# Patient Record
Sex: MALE | Race: WHITE | NOT HISPANIC OR LATINO | Employment: OTHER | URBAN - METROPOLITAN AREA
[De-identification: names, ages, dates, MRNs, and addresses within clinical notes are randomized per-mention and may not be internally consistent; named-entity substitution may affect disease eponyms.]

---

## 2023-08-24 ENCOUNTER — HOSPITAL ENCOUNTER (OUTPATIENT)
Dept: DATA CONVERSION | Facility: HOSPITAL | Age: 78
End: 2023-08-24

## 2023-09-17 PROBLEM — E78.5 HYPERLIPIDEMIA: Status: ACTIVE | Noted: 2023-09-17

## 2023-09-17 PROBLEM — I25.10 CORONARY ARTERIOSCLEROSIS: Status: ACTIVE | Noted: 2023-09-17

## 2023-09-17 PROBLEM — R04.0 EPISTAXIS: Status: ACTIVE | Noted: 2023-09-17

## 2023-09-17 PROBLEM — I10 BENIGN ESSENTIAL HYPERTENSION: Status: ACTIVE | Noted: 2023-09-17

## 2023-09-17 PROBLEM — G47.00 INSOMNIA: Status: ACTIVE | Noted: 2023-09-17

## 2023-09-17 PROBLEM — I10 HYPERTENSION: Status: ACTIVE | Noted: 2023-09-17

## 2023-09-17 PROBLEM — C44.42 SQUAMOUS CELL CARCINOMA OF SKIN OF SCALP AND NECK: Status: ACTIVE | Noted: 2021-04-08

## 2023-09-17 PROBLEM — I50.9 HEART FAILURE (MULTI): Status: ACTIVE | Noted: 2023-09-17

## 2023-09-17 PROBLEM — C44.319 BASAL CELL CARCINOMA OF SKIN OF OTHER PARTS OF FACE: Status: ACTIVE | Noted: 2021-04-08

## 2023-09-17 PROBLEM — J34.2 DEVIATED NASAL SEPTUM: Status: ACTIVE | Noted: 2023-09-17

## 2023-09-17 PROBLEM — C61 MALIGNANT TUMOR OF PROSTATE (MULTI): Status: ACTIVE | Noted: 2023-09-17

## 2023-09-17 RX ORDER — LISINOPRIL 20 MG/1
10 TABLET ORAL DAILY
COMMUNITY
Start: 2024-03-13

## 2023-09-17 RX ORDER — ASPIRIN 81 MG/1
81 TABLET ORAL EVERY EVENING
COMMUNITY
End: 2024-02-13 | Stop reason: HOSPADM

## 2023-09-17 RX ORDER — ATORVASTATIN CALCIUM 20 MG/1
1 TABLET, FILM COATED ORAL DAILY
COMMUNITY
Start: 2021-06-24 | End: 2024-06-07

## 2023-09-17 RX ORDER — NITROGLYCERIN 0.4 MG/1
0.4 TABLET SUBLINGUAL AS NEEDED
COMMUNITY
Start: 2021-02-09

## 2023-09-17 RX ORDER — ALLOPURINOL 300 MG/1
1 TABLET ORAL DAILY
COMMUNITY

## 2023-09-17 RX ORDER — CEFUROXIME AXETIL 250 MG/1
250 TABLET ORAL
COMMUNITY
Start: 2016-12-08 | End: 2024-01-11

## 2023-09-17 RX ORDER — IBUPROFEN 800 MG/1
800 TABLET ORAL 3 TIMES DAILY
COMMUNITY
End: 2024-01-11 | Stop reason: WASHOUT

## 2023-09-17 RX ORDER — ZOLPIDEM TARTRATE 10 MG/1
.5-1 TABLET ORAL NIGHTLY
COMMUNITY
Start: 2023-04-28 | End: 2023-10-25

## 2023-09-17 RX ORDER — DOCUSATE SODIUM 100 MG/1
100 CAPSULE, LIQUID FILLED ORAL 2 TIMES DAILY
COMMUNITY
End: 2024-01-11 | Stop reason: WASHOUT

## 2023-09-17 RX ORDER — TIZANIDINE 4 MG/1
4 TABLET ORAL EVERY 8 HOURS PRN
COMMUNITY
End: 2024-01-11 | Stop reason: WASHOUT

## 2023-09-17 RX ORDER — ACETAMINOPHEN 500 MG
500 TABLET ORAL EVERY 4 HOURS
COMMUNITY
End: 2024-01-31 | Stop reason: WASHOUT

## 2023-09-17 RX ORDER — SOLIFENACIN SUCCINATE 10 MG/1
10 TABLET, FILM COATED ORAL DAILY
COMMUNITY

## 2023-09-17 RX ORDER — LISINOPRIL 2.5 MG/1
2.5 TABLET ORAL DAILY
COMMUNITY
End: 2024-01-11 | Stop reason: WASHOUT

## 2023-09-17 RX ORDER — COLCHICINE 0.6 MG/1
0.6 TABLET ORAL AS NEEDED
COMMUNITY
Start: 2021-10-12 | End: 2024-01-31 | Stop reason: WASHOUT

## 2023-09-17 RX ORDER — METOPROLOL SUCCINATE 25 MG/1
0.5 TABLET, EXTENDED RELEASE ORAL DAILY
COMMUNITY
Start: 2024-03-13 | End: 2024-05-31

## 2023-09-25 ENCOUNTER — HOSPITAL ENCOUNTER (OUTPATIENT)
Dept: DATA CONVERSION | Facility: HOSPITAL | Age: 78
End: 2023-09-25
Payer: MEDICARE

## 2023-10-25 DIAGNOSIS — F51.01 PRIMARY INSOMNIA: Primary | ICD-10-CM

## 2023-10-25 RX ORDER — ZOLPIDEM TARTRATE 10 MG/1
TABLET ORAL
Qty: 90 TABLET | Refills: 2 | Status: SHIPPED | OUTPATIENT
Start: 2023-10-25 | End: 2024-04-08

## 2023-10-26 ENCOUNTER — APPOINTMENT (OUTPATIENT)
Dept: CARDIOLOGY | Facility: CLINIC | Age: 78
End: 2023-10-26

## 2024-01-11 ENCOUNTER — OFFICE VISIT (OUTPATIENT)
Dept: CARDIOLOGY | Facility: CLINIC | Age: 79
End: 2024-01-11
Payer: MEDICARE

## 2024-01-11 VITALS
DIASTOLIC BLOOD PRESSURE: 79 MMHG | HEIGHT: 69 IN | RESPIRATION RATE: 16 BRPM | WEIGHT: 195 LBS | HEART RATE: 63 BPM | SYSTOLIC BLOOD PRESSURE: 131 MMHG | OXYGEN SATURATION: 96 % | BODY MASS INDEX: 28.88 KG/M2

## 2024-01-11 DIAGNOSIS — I10 BENIGN ESSENTIAL HYPERTENSION: Primary | ICD-10-CM

## 2024-01-11 PROCEDURE — 3075F SYST BP GE 130 - 139MM HG: CPT | Performed by: NURSE PRACTITIONER

## 2024-01-11 PROCEDURE — 1126F AMNT PAIN NOTED NONE PRSNT: CPT | Performed by: NURSE PRACTITIONER

## 2024-01-11 PROCEDURE — 99214 OFFICE O/P EST MOD 30 MIN: CPT | Performed by: NURSE PRACTITIONER

## 2024-01-11 PROCEDURE — 1036F TOBACCO NON-USER: CPT | Performed by: NURSE PRACTITIONER

## 2024-01-11 PROCEDURE — 1160F RVW MEDS BY RX/DR IN RCRD: CPT | Performed by: NURSE PRACTITIONER

## 2024-01-11 PROCEDURE — 3078F DIAST BP <80 MM HG: CPT | Performed by: NURSE PRACTITIONER

## 2024-01-11 PROCEDURE — 1159F MED LIST DOCD IN RCRD: CPT | Performed by: NURSE PRACTITIONER

## 2024-01-11 ASSESSMENT — ENCOUNTER SYMPTOMS
MUSCULOSKELETAL NEGATIVE: 1
CONSTITUTIONAL NEGATIVE: 1
CARDIOVASCULAR NEGATIVE: 1
GASTROINTESTINAL NEGATIVE: 1
RESPIRATORY NEGATIVE: 1
DEPRESSION: 0
NEUROLOGICAL NEGATIVE: 1

## 2024-01-11 ASSESSMENT — PATIENT HEALTH QUESTIONNAIRE - PHQ9
2. FEELING DOWN, DEPRESSED OR HOPELESS: NOT AT ALL
SUM OF ALL RESPONSES TO PHQ9 QUESTIONS 1 AND 2: 0
1. LITTLE INTEREST OR PLEASURE IN DOING THINGS: NOT AT ALL

## 2024-01-11 NOTE — PROGRESS NOTES
"Chief Complaint:   Follow-up (9 Month FUV)    History Of Present Illness:    .Mr Huff returns in follow up.  Denies chest pain, sob, palpitations or pedal edema.  His pcp will do fasting labs.  He is seeing ortho next week about having his knee done.         Last Recorded Vitals:  Blood pressure 131/79, pulse 63, resp. rate 16, height 1.753 m (5' 9\"), weight 88.5 kg (195 lb), SpO2 96 %.     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/12/2021    History of acute myocardial infarction    Personal history of malignant neoplasm, unspecified     History of malignant neoplasm        Past Surgical History:  Past Surgical History:   Procedure Laterality Date    OTHER SURGICAL HISTORY  02/15/2022    Knee surgery    OTHER SURGICAL HISTORY  02/15/2022    Hernia repair       Social History:  Social History     Socioeconomic History    Marital status:      Spouse name: None    Number of children: None    Years of education: None    Highest education level: None   Occupational History    None   Tobacco Use    Smoking status: Former     Types: Cigarettes    Smokeless tobacco: Never   Substance and Sexual Activity    Alcohol use: Not Currently    Drug use: Never    Sexual activity: None   Other Topics Concern    None   Social History Narrative    None     Social Determinants of Health     Financial Resource Strain: Not on file   Food Insecurity: Not on file   Transportation Needs: Not on file   Physical Activity: Not on file   Stress: Not on file   Social Connections: Not on file   Intimate Partner Violence: Not on file   Housing Stability: Not on file       Family History:  Family History   Problem Relation Name Age of Onset    Other (CABG) Mother      Heart attack Father      Heart attack Brother      Other (CABG) Brother      Heart attack Brother      Other (CABG) Brother           Allergies:  Eszopiclone, Mirabegron, and Oxybutynin    Outpatient Medications:  Current " Outpatient Medications   Medication Sig Dispense Refill    acetaminophen (Tylenol) 500 mg tablet Take 1 tablet (500 mg) by mouth every 4 hours.      allopurinol (Zyloprim) 300 mg tablet Take 1 tablet (300 mg) by mouth once daily.      aspirin 81 mg EC tablet Take 1 tablet (81 mg) by mouth once daily in the evening.      atorvastatin (Lipitor) 20 mg tablet Take 1 tablet (20 mg) by mouth once daily.      colchicine, gout, 0.6 mg tablet Take 1 tablet (0.6 mg) by mouth if needed. w/ flare      lisinopril 20 mg tablet Take 1 tablet (20 mg) by mouth once daily.      metoprolol succinate XL (Toprol-XL) 25 mg 24 hr tablet Take 1 tablet (25 mg) by mouth once daily.      nitroglycerin (Nitrostat) 0.4 mg SL tablet Place 1 tablet (0.4 mg) under the tongue if needed for chest pain.      solifenacin (VESIcare) 10 mg tablet Take 1 tablet (10 mg) by mouth once daily.      zolpidem (Ambien) 10 mg tablet TAKE 1/2 TO 1 TABLET BY MOUTH  ONCE DAILY AT BEDTIME 90 tablet 2     No current facility-administered medications for this visit.        Physical Exam:  Cardiovascular:      PMI at left midclavicular line. Normal rate. Regular rhythm. Normal S1. Normal S2.       Murmurs: There is no murmur.      No gallop.  No click. No rub.   Pulses:     Intact distal pulses.   Edema:     Peripheral edema absent.         ROS:  Review of Systems   Constitutional: Negative.   Cardiovascular: Negative.    Respiratory: Negative.     Skin: Negative.    Musculoskeletal: Negative.    Gastrointestinal: Negative.    Genitourinary: Negative.    Neurological: Negative.           Last Labs:  CBC -  Lab Results   Component Value Date    WBC 5.7 10/11/2021    HGB 13.3 (L) 10/11/2021    HCT 39.7 (L) 10/11/2021    MCV 99.0 10/11/2021     10/11/2021       CMP -  Lab Results   Component Value Date    CALCIUM 9.2 01/31/2023    PROT 6.9 01/31/2023    ALBUMIN 4.3 01/31/2023    AST 19 01/31/2023    ALT 16 01/31/2023    ALKPHOS 112 01/31/2023    BILITOT 0.7  "01/31/2023       LIPID PANEL -   Lab Results   Component Value Date    CHOL 115 (L) 01/31/2023    TRIG 47 01/31/2023    HDL 50 01/31/2023    CHHDL 2.3 01/31/2023    LDLF 41 01/21/2022    VLDL 13 01/21/2022       RENAL FUNCTION PANEL -   Lab Results   Component Value Date    GLUCOSE 98 01/31/2023     01/31/2023    K 4.2 01/31/2023     01/31/2023    CO2 22 (L) 01/31/2023    ANIONGAP 12 01/31/2023    BUN 22 01/31/2023    CREATININE 1.3 01/31/2023    CALCIUM 9.2 01/31/2023    ALBUMIN 4.3 01/31/2023        No results found for: \"BNP\", \"HGBA1C\"      Assessment/Plan   Problem List Items Addressed This Visit    None    Impressions     1. CAD. Patient has a history of documented coronary artery disease with a non-STEMI on 6/15/2016 secondary to complete occlusion of the diagonal branch, not an amenable to percutaneous coronary intervention. Patient had a very similar episode of chest discomfort lasting nearly 3 hours for which she was seen at the [facility] emergency department on October 11, 2021. EKG and high sensitive troponins were negative for infarction. Given the very similar nature of his symptoms to the original non-STEMI, diagnostic coronary angiography was recommended. The patient underwent diagnostic coronary angiography with LMCA normal, LAD has minor proximal luminal irregularities, complete 100% occlusion of the proximal first diagonal branch as previously noted with retrograde filling. The nondominant LCx has moderate disease. The dominant right coronary artery also has minor disease except for a 50% stenosis within the early mid posterior descending branch unchanged from his previous study as well. The left ventricle EF shows preserved left ventricular function. No significant change in anatomy since his study that was performed 6/15/2016. Medical management was recommended. Echo done 04/2022 showed EF 60-65% with mild focal basal septal hypertrophy. We will have him return in 6 months.     2. " Hypertension.     3. Hyperlipidemia.          Mariela Goodson, APRN-CNP

## 2024-01-30 ENCOUNTER — APPOINTMENT (OUTPATIENT)
Dept: PREADMISSION TESTING | Facility: HOSPITAL | Age: 79
End: 2024-01-30
Payer: MEDICARE

## 2024-01-31 ENCOUNTER — OFFICE VISIT (OUTPATIENT)
Dept: PRIMARY CARE | Facility: CLINIC | Age: 79
End: 2024-01-31
Payer: MEDICARE

## 2024-01-31 VITALS
BODY MASS INDEX: 27.63 KG/M2 | WEIGHT: 193 LBS | HEIGHT: 70 IN | HEART RATE: 60 BPM | SYSTOLIC BLOOD PRESSURE: 122 MMHG | OXYGEN SATURATION: 97 % | DIASTOLIC BLOOD PRESSURE: 66 MMHG

## 2024-01-31 DIAGNOSIS — N40.1 BENIGN NON-NODULAR PROSTATIC HYPERPLASIA WITH LOWER URINARY TRACT SYMPTOMS: ICD-10-CM

## 2024-01-31 DIAGNOSIS — F51.04 CHRONIC INSOMNIA: ICD-10-CM

## 2024-01-31 DIAGNOSIS — I10 BENIGN ESSENTIAL HYPERTENSION: ICD-10-CM

## 2024-01-31 DIAGNOSIS — E78.2 MIXED HYPERLIPIDEMIA: ICD-10-CM

## 2024-01-31 DIAGNOSIS — N52.34 ERECTILE DYSFUNCTION FOLLOWING SIMPLE PROSTATECTOMY: ICD-10-CM

## 2024-01-31 DIAGNOSIS — M17.12 ARTHRITIS OF LEFT KNEE: Primary | ICD-10-CM

## 2024-01-31 DIAGNOSIS — R35.0 URINARY FREQUENCY: ICD-10-CM

## 2024-01-31 DIAGNOSIS — I50.42 CHRONIC COMBINED SYSTOLIC AND DIASTOLIC HEART FAILURE (MULTI): ICD-10-CM

## 2024-01-31 DIAGNOSIS — I25.10 CORONARY ARTERIOSCLEROSIS: ICD-10-CM

## 2024-01-31 DIAGNOSIS — C61 PROSTATE CANCER (MULTI): ICD-10-CM

## 2024-01-31 DIAGNOSIS — N20.0 RENAL STONE: ICD-10-CM

## 2024-01-31 PROBLEM — G47.00 INSOMNIA: Status: RESOLVED | Noted: 2023-09-17 | Resolved: 2024-01-31

## 2024-01-31 PROBLEM — R04.0 EPISTAXIS: Status: RESOLVED | Noted: 2023-09-17 | Resolved: 2024-01-31

## 2024-01-31 PROBLEM — N28.1 RENAL CYST: Status: ACTIVE | Noted: 2019-12-12

## 2024-01-31 PROCEDURE — 3078F DIAST BP <80 MM HG: CPT | Performed by: INTERNAL MEDICINE

## 2024-01-31 PROCEDURE — 1159F MED LIST DOCD IN RCRD: CPT | Performed by: INTERNAL MEDICINE

## 2024-01-31 PROCEDURE — 1126F AMNT PAIN NOTED NONE PRSNT: CPT | Performed by: INTERNAL MEDICINE

## 2024-01-31 PROCEDURE — 1036F TOBACCO NON-USER: CPT | Performed by: INTERNAL MEDICINE

## 2024-01-31 PROCEDURE — 1160F RVW MEDS BY RX/DR IN RCRD: CPT | Performed by: INTERNAL MEDICINE

## 2024-01-31 PROCEDURE — 3074F SYST BP LT 130 MM HG: CPT | Performed by: INTERNAL MEDICINE

## 2024-01-31 PROCEDURE — 99214 OFFICE O/P EST MOD 30 MIN: CPT | Performed by: INTERNAL MEDICINE

## 2024-01-31 RX ORDER — TAMSULOSIN HYDROCHLORIDE 0.4 MG/1
0.4 CAPSULE ORAL DAILY
COMMUNITY
Start: 2023-09-18 | End: 2024-01-31 | Stop reason: ALTCHOICE

## 2024-01-31 RX ORDER — NYSTATIN 100000 U/G
1 CREAM TOPICAL AS NEEDED
Status: ON HOLD | COMMUNITY
Start: 2024-01-24 | End: 2024-02-12 | Stop reason: ALTCHOICE

## 2024-01-31 RX ORDER — ISOSORBIDE MONONITRATE 30 MG/1
30 TABLET, EXTENDED RELEASE ORAL DAILY
Status: ON HOLD | COMMUNITY
End: 2024-02-12 | Stop reason: ALTCHOICE

## 2024-01-31 RX ORDER — TRIAMCINOLONE ACETONIDE 0.25 MG/G
1 CREAM TOPICAL DAILY PRN
Status: ON HOLD | COMMUNITY
Start: 2024-01-24 | End: 2024-02-12 | Stop reason: ALTCHOICE

## 2024-01-31 RX ORDER — KETOCONAZOLE 20 MG/ML
SHAMPOO, SUSPENSION TOPICAL DAILY PRN
Status: ON HOLD | COMMUNITY
Start: 2024-01-24 | End: 2024-02-12 | Stop reason: ALTCHOICE

## 2024-01-31 ASSESSMENT — ENCOUNTER SYMPTOMS
CARDIOVASCULAR NEGATIVE: 1
DYSURIA: 0
LOSS OF SENSATION IN FEET: 0
ABDOMINAL PAIN: 0
OCCASIONAL FEELINGS OF UNSTEADINESS: 0
ACTIVITY CHANGE: 0
SHORTNESS OF BREATH: 0
PSYCHIATRIC NEGATIVE: 1
PALPITATIONS: 0
COUGH: 0
ARTHRALGIAS: 1
DIZZINESS: 1
CONSTIPATION: 0
DIARRHEA: 0
CONSTITUTIONAL NEGATIVE: 1
DEPRESSION: 0

## 2024-01-31 ASSESSMENT — PATIENT HEALTH QUESTIONNAIRE - PHQ9
SUM OF ALL RESPONSES TO PHQ9 QUESTIONS 1 AND 2: 0
2. FEELING DOWN, DEPRESSED OR HOPELESS: NOT AT ALL
1. LITTLE INTEREST OR PLEASURE IN DOING THINGS: NOT AT ALL

## 2024-01-31 ASSESSMENT — PAIN SCALES - GENERAL: PAINLEVEL: 0-NO PAIN

## 2024-01-31 NOTE — PROGRESS NOTES
Subjective   Patient ID: Avery Huff is a 79 y.o. male who presents for SURGICAL CLEARANCE.    Scheduled for LTKR with Dr Joe on 2/12/2024    CAD with CHF-stable on meds-no NTG use-managed by Dr Edgar. Cleared 2 weeks ago for surg    Hypertension-compliant and stable on current medications BP Readings from Last 3 Encounters:  01/31/24 : 122/66  01/11/24 : 131/79  08/21/23 : 112/64       Hyperlipidemia-stable and compliant on meds. Taking. Lab Results       Component                Value               Date                       LDLCALC                  56 (L)              01/31/2023          H/O prostate cancer-now with urinary issue and ED-on vesicare    Kidney stone-uric acid  . Taking allopurinol.   Lab Results       Component                Value               Date                       URICACID                 5.5                 01/31/2023          Chronic insomnia-relies on zolpidem to sleep    Exercise -golfs, cares for house and yard;       Diet   -low fat         Current Outpatient Medications:   ·  allopurinol (Zyloprim) 300 mg tablet, Take 1 tablet (300 mg) by mouth once daily., Disp: , Rfl:   ·  aspirin 81 mg EC tablet, Take 1 tablet (81 mg) by mouth once daily in the evening., Disp: , Rfl:   ·  atorvastatin (Lipitor) 20 mg tablet, Take 1 tablet (20 mg) by mouth once daily., Disp: , Rfl:   ·  isosorbide mononitrate ER (Imdur) 30 mg 24 hr tablet, Take 1 tablet (30 mg) by mouth once daily., Disp: , Rfl:   ·  ketoconazole (NIZOral) 2 % shampoo, Apply topically., Disp: , Rfl:   ·  lisinopril 20 mg tablet, Take 1 tablet (20 mg) by mouth once daily., Disp: , Rfl:   ·  metoprolol succinate XL (Toprol-XL) 25 mg 24 hr tablet, Take 1 tablet (25 mg) by mouth once daily., Disp: , Rfl:   ·  nitroglycerin (Nitrostat) 0.4 mg SL tablet, Place 1 tablet (0.4 mg) under the tongue if needed for chest pain., Disp: , Rfl:   ·  nystatin (Mycostatin) cream, Apply topically., Disp: , Rfl:   ·  solifenacin (VESIcare) 10 mg  "tablet, Take 1 tablet (10 mg) by mouth once daily., Disp: , Rfl:   ·  triamcinolone (Kenalog) 0.025 % cream, Apply 1 Application topically., Disp: , Rfl:   ·  zolpidem (Ambien) 10 mg tablet, TAKE 1/2 TO 1 TABLET BY MOUTH  ONCE DAILY AT BEDTIME, Disp: 90 tablet, Rfl: 2                    Review of Systems   Constitutional: Negative.  Negative for activity change.   HENT:  Positive for sneezing.    Respiratory:  Negative for cough and shortness of breath.    Cardiovascular: Negative.  Negative for chest pain, palpitations and leg swelling.   Gastrointestinal:  Negative for abdominal pain, constipation and diarrhea.   Genitourinary:  Negative for dysuria.   Musculoskeletal:  Positive for arthralgias (catrina L knee).   Skin:  Negative for rash.   Neurological:  Positive for dizziness.   Psychiatric/Behavioral: Negative.         Objective   /66   Pulse 60   Ht 1.778 m (5' 10\")   Wt 87.5 kg (193 lb)   SpO2 97%   BMI 27.69 kg/m²     Physical Exam  Vitals reviewed.   Constitutional:       General: He is not in acute distress.     Appearance: Normal appearance.   HENT:      Right Ear: Tympanic membrane normal.      Left Ear: Tympanic membrane normal.      Nose: Nose normal. No congestion or rhinorrhea.      Mouth/Throat:      Pharynx: Oropharynx is clear.   Neck:      Thyroid: No thyromegaly.   Cardiovascular:      Rate and Rhythm: Normal rate and regular rhythm.      Heart sounds: Normal heart sounds. No murmur heard.     No gallop.   Pulmonary:      Breath sounds: Normal breath sounds. No wheezing, rhonchi or rales.   Abdominal:      General: Abdomen is flat. Bowel sounds are normal.      Palpations: Abdomen is soft.      Tenderness: There is no abdominal tenderness.   Genitourinary:     Comments: Defer to urology  Musculoskeletal:      Comments: Defer to Dr Joe   Lymphadenopathy:      Cervical: No cervical adenopathy.   Skin:     General: Skin is warm and dry.      Findings: No rash.   Neurological:      " General: No focal deficit present.      Mental Status: He is alert and oriented to person, place, and time.   Psychiatric:         Mood and Affect: Mood normal.         Assessment/Plan   medically cleared for surgery  Diagnoses and all orders for this visit:  Arthritis of left knee  Benign essential hypertension  -     Comprehensive Metabolic Panel; Future  Coronary arteriosclerosis  Chronic combined systolic and diastolic heart failure (CMS/HCC)  Mixed hyperlipidemia  -     Lipid Panel; Future  Benign non-nodular prostatic hyperplasia with lower urinary tract symptoms  Erectile dysfunction following simple prostatectomy  Renal stone  -     Uric Acid; Future  Urinary frequency  Prostate cancer (CMS/HCC)  Chronic insomnia

## 2024-02-05 ENCOUNTER — APPOINTMENT (OUTPATIENT)
Dept: PRIMARY CARE | Facility: CLINIC | Age: 79
End: 2024-02-05
Payer: MEDICARE

## 2024-02-06 ENCOUNTER — PRE-ADMISSION TESTING (OUTPATIENT)
Dept: PREADMISSION TESTING | Facility: HOSPITAL | Age: 79
End: 2024-02-06
Payer: MEDICARE

## 2024-02-06 ENCOUNTER — LAB (OUTPATIENT)
Dept: LAB | Facility: LAB | Age: 79
End: 2024-02-06
Payer: MEDICARE

## 2024-02-06 VITALS
SYSTOLIC BLOOD PRESSURE: 149 MMHG | TEMPERATURE: 97.2 F | BODY MASS INDEX: 35.51 KG/M2 | HEART RATE: 64 BPM | RESPIRATION RATE: 16 BRPM | HEIGHT: 62 IN | WEIGHT: 193 LBS | DIASTOLIC BLOOD PRESSURE: 82 MMHG | OXYGEN SATURATION: 99 %

## 2024-02-06 DIAGNOSIS — N20.0 RENAL STONE: ICD-10-CM

## 2024-02-06 DIAGNOSIS — Z01.818 PREOPERATIVE TESTING: ICD-10-CM

## 2024-02-06 DIAGNOSIS — E78.2 MIXED HYPERLIPIDEMIA: ICD-10-CM

## 2024-02-06 DIAGNOSIS — Z01.818 PREOPERATIVE TESTING: Primary | ICD-10-CM

## 2024-02-06 DIAGNOSIS — I10 BENIGN ESSENTIAL HYPERTENSION: ICD-10-CM

## 2024-02-06 LAB
ALBUMIN SERPL BCP-MCNC: 4.5 G/DL (ref 3.4–5)
ALP SERPL-CCNC: 105 U/L (ref 33–136)
ALT SERPL W P-5'-P-CCNC: 15 U/L (ref 10–52)
ANION GAP SERPL CALC-SCNC: 12 MMOL/L (ref 10–20)
APPEARANCE UR: CLEAR
AST SERPL W P-5'-P-CCNC: 16 U/L (ref 9–39)
BILIRUB SERPL-MCNC: 0.6 MG/DL (ref 0–1.2)
BILIRUB UR STRIP.AUTO-MCNC: NEGATIVE MG/DL
BUN SERPL-MCNC: 26 MG/DL (ref 6–23)
CALCIUM SERPL-MCNC: 9.4 MG/DL (ref 8.6–10.3)
CHLORIDE SERPL-SCNC: 105 MMOL/L (ref 98–107)
CHOLEST SERPL-MCNC: 118 MG/DL (ref 0–199)
CHOLESTEROL/HDL RATIO: 2.5
CO2 SERPL-SCNC: 27 MMOL/L (ref 21–32)
COLOR UR: NORMAL
CREAT SERPL-MCNC: 1.27 MG/DL (ref 0.5–1.3)
EGFRCR SERPLBLD CKD-EPI 2021: 57 ML/MIN/1.73M*2
ERYTHROCYTE [DISTWIDTH] IN BLOOD BY AUTOMATED COUNT: 13.9 % (ref 11.5–14.5)
GLUCOSE SERPL-MCNC: 71 MG/DL (ref 74–99)
GLUCOSE UR STRIP.AUTO-MCNC: NORMAL MG/DL
HCT VFR BLD AUTO: 44.7 % (ref 41–52)
HDLC SERPL-MCNC: 48.1 MG/DL
HGB BLD-MCNC: 14.4 G/DL (ref 13.5–17.5)
KETONES UR STRIP.AUTO-MCNC: NEGATIVE MG/DL
LDLC SERPL CALC-MCNC: 45 MG/DL
LEUKOCYTE ESTERASE UR QL STRIP.AUTO: NEGATIVE
MCH RBC QN AUTO: 33 PG (ref 26–34)
MCHC RBC AUTO-ENTMCNC: 32.2 G/DL (ref 32–36)
MCV RBC AUTO: 103 FL (ref 80–100)
MUCOUS THREADS #/AREA URNS AUTO: NORMAL /LPF
NITRITE UR QL STRIP.AUTO: NEGATIVE
NON HDL CHOLESTEROL: 70 MG/DL (ref 0–149)
NRBC BLD-RTO: 0 /100 WBCS (ref 0–0)
PH UR STRIP.AUTO: 5 [PH]
PLATELET # BLD AUTO: 237 X10*3/UL (ref 150–450)
POTASSIUM SERPL-SCNC: 4.3 MMOL/L (ref 3.5–5.3)
PROT SERPL-MCNC: 6.8 G/DL (ref 6.4–8.2)
PROT UR STRIP.AUTO-MCNC: NORMAL MG/DL
RBC # BLD AUTO: 4.36 X10*6/UL (ref 4.5–5.9)
RBC # UR STRIP.AUTO: NEGATIVE /UL
RBC #/AREA URNS AUTO: NORMAL /HPF
SODIUM SERPL-SCNC: 140 MMOL/L (ref 136–145)
SP GR UR STRIP.AUTO: 1.02
TRIGL SERPL-MCNC: 124 MG/DL (ref 0–149)
URATE SERPL-MCNC: 5.3 MG/DL (ref 4–7.5)
UROBILINOGEN UR STRIP.AUTO-MCNC: NORMAL MG/DL
VLDL: 25 MG/DL (ref 0–40)
WBC # BLD AUTO: 8.2 X10*3/UL (ref 4.4–11.3)
WBC #/AREA URNS AUTO: NORMAL /HPF

## 2024-02-06 PROCEDURE — 93005 ELECTROCARDIOGRAM TRACING: CPT

## 2024-02-06 PROCEDURE — 81001 URINALYSIS AUTO W/SCOPE: CPT | Performed by: NURSE PRACTITIONER

## 2024-02-06 PROCEDURE — 93010 ELECTROCARDIOGRAM REPORT: CPT | Performed by: INTERNAL MEDICINE

## 2024-02-06 PROCEDURE — 87081 CULTURE SCREEN ONLY: CPT | Mod: TRILAB,WESLAB | Performed by: NURSE PRACTITIONER

## 2024-02-06 PROCEDURE — 85027 COMPLETE CBC AUTOMATED: CPT

## 2024-02-06 PROCEDURE — 80053 COMPREHEN METABOLIC PANEL: CPT

## 2024-02-06 PROCEDURE — 99204 OFFICE O/P NEW MOD 45 MIN: CPT | Performed by: NURSE PRACTITIONER

## 2024-02-06 PROCEDURE — 36415 COLL VENOUS BLD VENIPUNCTURE: CPT

## 2024-02-06 PROCEDURE — 80061 LIPID PANEL: CPT

## 2024-02-06 PROCEDURE — 84550 ASSAY OF BLOOD/URIC ACID: CPT

## 2024-02-06 RX ORDER — CHLORHEXIDINE GLUCONATE ORAL RINSE 1.2 MG/ML
SOLUTION DENTAL
Qty: 473 ML | Refills: 0 | Status: SHIPPED | OUTPATIENT
Start: 2024-02-06 | End: 2024-02-13 | Stop reason: HOSPADM

## 2024-02-06 ASSESSMENT — ENCOUNTER SYMPTOMS
ARTHRALGIAS: 1
ACTIVITY CHANGE: 1
PSYCHIATRIC NEGATIVE: 1
GASTROINTESTINAL NEGATIVE: 1
NEUROLOGICAL NEGATIVE: 1
RESPIRATORY NEGATIVE: 1
CARDIOVASCULAR NEGATIVE: 1
EYES NEGATIVE: 1

## 2024-02-06 ASSESSMENT — PAIN - FUNCTIONAL ASSESSMENT: PAIN_FUNCTIONAL_ASSESSMENT: 0-10

## 2024-02-06 ASSESSMENT — DUKE ACTIVITY SCORE INDEX (DASI)
CAN YOU DO YARD WORK LIKE RAKING LEAVES, WEEDING OR PUSHING A MOWER: YES
DASI METS SCORE: 9
CAN YOU WALK INDOORS, SUCH AS AROUND YOUR HOUSE: YES
CAN YOU TAKE CARE OF YOURSELF (EAT, DRESS, BATHE, OR USE TOILET): YES
CAN YOU PARTICIPATE IN STRENOUS SPORTS LIKE SWIMMING, SINGLES TENNIS, FOOTBALL, BASKETBALL, OR SKIING: NO
CAN YOU DO MODERATE WORK AROUND THE HOUSE LIKE VACUUMING, SWEEPING FLOORS OR CARRYING GROCERIES: YES
CAN YOU DO HEAVY WORK AROUND THE HOUSE LIKE SCRUBBING FLOORS OR LIFTING AND MOVING HEAVY FURNITURE: YES
CAN YOU RUN A SHORT DISTANCE: YES
TOTAL_SCORE: 50.7
CAN YOU WALK A BLOCK OR TWO ON LEVEL GROUND: YES
CAN YOU DO LIGHT WORK AROUND THE HOUSE LIKE DUSTING OR WASHING DISHES: YES
CAN YOU HAVE SEXUAL RELATIONS: YES
CAN YOU PARTICIPATE IN MODERATE RECREATIONAL ACTIVITIES LIKE GOLF, BOWLING, DANCING, DOUBLES TENNIS OR THROWING A BASEBALL OR FOOTBALL: YES
CAN YOU CLIMB A FLIGHT OF STAIRS OR WALK UP A HILL: YES

## 2024-02-06 ASSESSMENT — CHADS2 SCORE
CHADS2 SCORE: 3
CHF: YES
AGE GREATER THAN OR EQUAL TO 75: YES
PRIOR STROKE OR TIA OR THROMBOEMBOLISM: NO
HYPERTENSION: YES
DIABETES: NO

## 2024-02-06 ASSESSMENT — PAIN SCALES - GENERAL: PAINLEVEL_OUTOF10: 7

## 2024-02-06 ASSESSMENT — PAIN DESCRIPTION - DESCRIPTORS: DESCRIPTORS: SHARP;DULL;BURNING

## 2024-02-06 NOTE — PREPROCEDURE INSTRUCTIONS
PAT DISCHARGE INSTRUCTIONS    Please call the Same Day Surgery (SDS) Department of the hospital where your procedure will be performed after 2:00 PM the day before your surgery. If you are scheduled on a Monday, or a Tuesday following a Monday holiday, you will need to call on the last business day prior to your surgery.    [facility]  [address], [address]  [phone]    [facility]  [address] [place] [address] [phone]    [facility]  [address]  [place] [id number]  [phone]    Please let your surgeon know if:      You develop any open sores, shingles, burning or painful urination as these may increase your risk of an infection.   You no longer wish to have the surgery.   Any other personal circumstances change that may lead to the need to cancel or defer this surgery-such as being sick or getting admitted to any hospital within one week of your planned procedure.    Your contact details change, such as a change of address or phone number.    Starting now:     Please DO NOT drink alcohol or smoke for 24 hours before surgery. It is well known that quitting smoking can make a huge difference to your health and recovery from surgery. The longer you abstain from smoking, the better your chances of a healthy recovery. If you need help with quitting, call 7-800-QUIT-NOW to be connected to a trained counselor who will discuss the best methods to help you quit.     Before your surgery:    Please stop all supplements 7 days prior to surgery. Or as directed by your surgeon.   Please stop taking NSAID pain medicine such as Advil and Motrin 7 days before surgery.    If you develop any fever, cough, cold, rashes, cuts, scratches, scrapes, urinary symptoms or infection anywhere on your body (including teeth and gums) prior to surgery, please call your surgeon’s office as soon as  possible. This may require treatment to reduce the chance of cancellation on the day of surgery.    The day before your surgery:   DIET- Do not eat any food after MIDNIGHT. May have 10 ounces of CLEAR LIQUIDS until TWO HOURS before your arrival time. This includes water, black tea or coffee (no milk ir cream), apple juice and electrolyte drinks (Gatorade). May chew gum until TWO hours before your surgery time.   Get a good night’s rest.  Use the special soap for bathing if you have been instructed to use one.    Scheduled surgery times may change and you will be notified if this occurs - please check your personal voicemail for any updates.     On the morning of surgery:   Wear comfortable, loose fitting clothes which open in the front. Please do not wear moisturizers, creams, lotions, makeup or perfume.    Please bring with you to surgery:   Photo ID and insurance card   Current list of medicines and allergies   Pacemaker/ Defibrillator/Heart stent cards   CPAP machine and mask    Slings/ splints/ crutches   A copy of your complete advanced directive/DHPOA.    Please do NOT bring with you to surgery:   All jewelry and valuables should be left at home.   Prosthetic devices such as contact lenses, hearing aids, dentures, eyelash extensions, hairpins and body piercings must be removed prior to going in to the surgical suite.    After outpatient surgery:   A responsible adult MUST accompany you at the time of discharge and stay with you for 24 hours after your surgery. You may NOT drive yourself home after surgery.    Do not drive, operate machinery, make critical decisions or do activities that require co-ordination or balance until after a night’s sleep.   Do not drink alcoholic beverages for 24 hours.   Instructions for resuming your medications will be provided by your surgeon.    CALL YOUR DOCTOR AFTER SURGERY IF YOU HAVE:     Chills and/or a fever of 101° F or higher.    Redness, swelling, pus or drainage from  your surgical wound or a bad smell from the wound.    Lightheadedness, fainting or confusion.    Persistent vomiting (throwing up) and are not able to eat or drink for 12 hours.    Three or more loose, watery bowel movements in 24 hours (diarrhea).   Difficulty or pain while urinating( after non-urological surgery)    Pain and swelling in your legs, especially if it is only on one side.    Difficulty breathing or are breathing faster than normal.    Any new concerning symptoms.     Home Preoperative Antibacterial Shower    What is a home preoperative antibacterial shower?  This shower is a way of cleaning the skin with a germ killing solution before surgery. The solution contains chlorhexidine, commonly known as CHG. CHG is a skin cleanser with germ killing ability. Let your doctor know if you are allergic to chlorhexidine.      Why do I need to take a preoperative antibacterial shower?  Skin is not sterile. It is best to try to make your skin as free of germs as possible before surgery. Proper cleansing with a germ killing soap before surgery can lower the number of germs on your skin. This helps to reduce the risk of infection at the surgical site. Following the instructions listed below will help you prepare your skin for surgery.    How do I use the solution?      Steps: Begin using your CHG soap FIVE DAYS BEFORE your scheduled surgery on __________________________________________________.  First, wash and rinse your hair using the CHG soap. Keep CHG away soap away from ear canals and eyes.  Rinse completely, do not condition. Hair extensions should be removed.  Wash your face with your normal soap and rinse.  Apply the CHG solution to a clean wet washcloth. Turn the water off or move away from the water spray to avoid premature rinsing of the CHG soap as you are applying.  Firmly lather your entire body from neck down. Do not use on face.  Pay special attention to the areas(s) where your incision(s) will be  located unless they are on your face.  Avoid scrubbing your skin too hard.  The important point is to have the CHG soap sit on your skin for 3 minutes.  DO NOT wash with regular soap after you have used the CHG soap solution.  Pat yourself dry with a clean, freshly laundered towel.  DO NOT apply powders, deodorants or lotions.  Dress in clean, freshly laundered night clothes.  Be sure to sleep with clean, freshly laundered sheets.  Be aware that CHG will cause stains on fabrics; if you wash them with bleach after use. Rinse your washcloth and other linens that have contact with CHG completely. Use only non-chlorine detergents to launder the items used.  The morning of surgery is the fifth day. Repeat the above steps and dress in clean comfortable clothing.      Who should I call if I have any questions regarding the use of CHG soap?  Call the [facility] at [phone] if you have any questions.     Patient Information: Oral/Dental Rinse    What is oral/dental rinse?  It is a mouthwash. It is a way of cleaning the mouth with a germ killing solution before your surgery. The solution contains chlorhexidine, commonly know as CHG.  It is used inside the mouth to kill bacteria known as Staphylococcus aureus.  Let your doctor know if you are allergic to chlorhexidine.    Why do I need to use CHG oral/dental rinse?  The CHG oral/dental rinse helps to kill bacteria in your mouth known as Staphylococcus aureus. This reduces the risk of infection at the surgical site.    Using your CHG oral/dental rinse.  STEPS: use your CHG oral/dental rinse after you brush your teeth the night before (at bedtime) and the morning of your surgery. Follow the directions on your prescription label.  Use the cap on the container to measure 15ml (fill cap to fill line)  Swish (gargle if you can) the mouthwash in your mouth for at least 30 seconds, (do not swallow) spit  out.  After you use your CHG rinse, do not rinse your mouth with water, drink or eat. Please refer to prescription label for the appropriate time to resume oral intake.    What side effects might I have using the CHG oral/dental rinse?  CHG rinse will stick to the plaque on the teeth. Brush and floss just before use. Teeth brushing will help avoid staining of plaque during use.    Who should I contact if I have questions about the CHG oral/dental rinse?  Please call [facility], [facility] at [phone] if you have any questions. Patient Information: Oral/Dental Rinse    What is oral/dental rinse?  It is a mouthwash. It is a way of cleaning the mouth with a germ killing solution before your surgery. The solution contains chlorhexidine, commonly know as CHG.  It is used inside the mouth to kill bacteria known as Staphylococcus aureus.  Let your doctor know if you are allergic to chlorhexidine.    Why do I need to use CHG oral/dental rinse?  The CHG oral/dental rinse helps to kill bacteria in your mouth known as Staphylococcus aureus. This reduces the risk of infection at the surgical site.    Using your CHG oral/dental rinse.  STEPS: use your CHG oral/dental rinse after you brush your teeth the night before (at bedtime) and the morning of your surgery. Follow the directions on your prescription label.  Use the cap on the container to measure 15ml (fill cap to fill line)  Swish (gargle if you can) the mouthwash in your mouth for at least 30 seconds, (do not swallow) spit out.  After you use your CHG rinse, do not rinse your mouth with water, drink or eat. Please refer to prescription label for the appropriate time to resume oral intake.    What side effects might I have using the CHG oral/dental rinse?  CHG rinse will stick to the plaque on the teeth. Brush and floss just before use. Teeth brushing will help avoid staining of plaque during use.    Who should I  contact if I have questions about the CHG oral/dental rinse?  Please call [facility], [facility] at [phone] if you have any questions.       Medication List            Accurate as of February 6, 2024  2:35 PM. Always use your most recent med list.                allopurinol 300 mg tablet  Commonly known as: Zyloprim  Notes to patient: Take evening dose before surgery.     aspirin 81 mg EC tablet  Medication Adjustments for Surgery: Stop 7 days before surgery     atorvastatin 20 mg tablet  Commonly known as: Lipitor  Notes to patient: Take evening dose before surgery.     isosorbide mononitrate ER 30 mg 24 hr tablet  Commonly known as: Imdur  Notes to patient: NOT TAKING     ketoconazole 2 % shampoo  Commonly known as: NIZOral  Notes to patient: Take as needed.     lisinopril 20 mg tablet  Medication Adjustments for Surgery: Stop 1 day before surgery     metoprolol succinate XL 25 mg 24 hr tablet  Commonly known as: Toprol-XL  Notes to patient: Take evening dose before surgery.     Nitrostat 0.4 mg SL tablet  Generic drug: nitroglycerin  Notes to patient: Take as needed.     nystatin cream  Commonly known as: Mycostatin  Notes to patient: Take as needed.     solifenacin 10 mg tablet  Commonly known as: VESIcare  Notes to patient: Take evening dose before surgery.     triamcinolone 0.025 % cream  Commonly known as: Kenalog  Notes to patient: Take as needed.     zolpidem 10 mg tablet  Commonly known as: Ambien  TAKE 1/2 TO 1 TABLET BY MOUTH  ONCE DAILY AT BEDTIME  Notes to patient: Take evening dose before surgery.

## 2024-02-06 NOTE — CPM/PAT H&P
"CPM/PAT Evaluation       Name: Avery Huff (Avery Huff)  /Age: 1945/79 y.o.     In-Person       Chief Complaint: LEFT KNEE OSTEOARTHRITIS     HPI  A 79-year-old male with left knee osteoarthritis.  History of left knee surgery x 2 as a teen. He has had progressive left knee pain and swelling for the past several months. Reports knee \"catching\". Symptoms increase with prolonged standing, ambulation, and transitioning from sitting to standing interfering with ADLs.  Conservative treatments not helping.  Denies fever, chills, chest pain, shortness of breath, syncope, or left lower extremity numbness.  He is scheduled for left total knee arthroplasty.    Past Medical History:   Diagnosis Date    BPH (benign prostatic hyperplasia)     CAD (coronary artery disease)     H/O left knee surgery     x 2 as a teen    Heart failure (CMS/HCC)     HLD (hyperlipidemia)     Hypertension     Old myocardial infarction 10/12/2021    History of acute myocardial infarction    Personal history of malignant neoplasm, unspecified     History of malignant neoplasm    Prostate cancer (CMS/HCC)     Skin cancer        Past Surgical History:   Procedure Laterality Date    CARDIAC CATHETERIZATION      OTHER SURGICAL HISTORY  02/15/2022    Knee surgery    OTHER SURGICAL HISTORY  02/15/2022    Hernia repair         Allergies   Allergen Reactions    Eszopiclone Other     Bad taste    Mirabegron Other     Raised BP    Oxybutynin GI Upset       Current Outpatient Medications   Medication Sig Dispense Refill    allopurinol (Zyloprim) 300 mg tablet Take 1 tablet (300 mg) by mouth once daily.      aspirin 81 mg EC tablet Take 1 tablet (81 mg) by mouth once daily in the evening.      atorvastatin (Lipitor) 20 mg tablet Take 1 tablet (20 mg) by mouth once daily.      chlorhexidine (Peridex) 0.12 % solution Use cap to measure 15 mL.  Swish/gargle mouthwash for at least 30 seconds.  Do not swallow.  Use night before surgery after brushing " teeth and morning of surgery after brushing teeth. 473 mL 0    isosorbide mononitrate ER (Imdur) 30 mg 24 hr tablet Take 1 tablet (30 mg) by mouth once daily.      ketoconazole (NIZOral) 2 % shampoo Apply topically once daily as needed for irritation or dandruff.      lisinopril 20 mg tablet Take 1 tablet (20 mg) by mouth once daily.      metoprolol succinate XL (Toprol-XL) 25 mg 24 hr tablet Take 1 tablet (25 mg) by mouth once daily.      nitroglycerin (Nitrostat) 0.4 mg SL tablet Place 1 tablet (0.4 mg) under the tongue if needed for chest pain.      nystatin (Mycostatin) cream Apply 1 Application topically if needed (RASH).      solifenacin (VESIcare) 10 mg tablet Take 1 tablet (10 mg) by mouth once daily.      triamcinolone (Kenalog) 0.025 % cream Apply 1 Application topically once daily as needed for rash or irritation.      zolpidem (Ambien) 10 mg tablet TAKE 1/2 TO 1 TABLET BY MOUTH  ONCE DAILY AT BEDTIME (Patient taking differently: Take 1 tablet (10 mg) by mouth as needed at bedtime for sleep.) 90 tablet 2     No current facility-administered medications for this visit.          Review of Systems   Constitutional:  Positive for activity change.   HENT: Negative.     Eyes: Negative.    Respiratory: Negative.     Cardiovascular: Negative.    Gastrointestinal: Negative.    Genitourinary: Negative.    Musculoskeletal:  Positive for arthralgias.        Left knee pain, swelling   Skin: Negative.    Neurological: Negative.    Psychiatric/Behavioral: Negative.          Physical Exam  Vitals reviewed.   Constitutional:       Appearance: Normal appearance.   HENT:      Head: Normocephalic and atraumatic.      Mouth/Throat:      Mouth: Mucous membranes are moist.   Eyes:      Pupils: Pupils are equal, round, and reactive to light.   Cardiovascular:      Rate and Rhythm: Normal rate and regular rhythm.   Pulmonary:      Effort: Pulmonary effort is normal.      Breath sounds: Normal breath sounds.   Abdominal:       "Palpations: Abdomen is soft.   Musculoskeletal:      Cervical back: Normal range of motion.      Right lower leg: Edema present.      Left lower leg: Edema present.      Comments: Reports left knee pain, swelling and \"catching\"   Skin:     General: Skin is warm and dry.      Capillary Refill: Capillary refill takes more than 3 seconds.   Neurological:      Mental Status: He is alert and oriented to person, place, and time.   Psychiatric:         Mood and Affect: Mood normal.          PAT AIRWAY:   Airway:     Mallampati::  II    Neck ROM::  Full      /82   Pulse 64   Temp 36.2 °C (97.2 °F) (Temporal)   Resp 16   Ht 1.575 m (5' 2\")   Wt 87.5 kg (193 lb)   SpO2 99%   BMI 35.30 kg/m²         Stop Bang Score 6     CHADS 2 score: 5.9%  DASI score: 50.7  METS score: 9  Revised cardiac risk index: >11%  ASA III  ARISCAT 1.6%    Cardiac clearance done 1/16/2024 with PCP Dr. Koo      Assessment and Plan:     LEFT KNEE OSTEOARTHRITIS Plan: left total knee arthroplasty.  CAD history of MI 2016 status post cardiac medical management-follows with Dr. Edgar cardiologist  Hypertension  History of heart failure per record Echo LVEF 60-65% 4/2022  Hyperlipidemia  BPH  Prostate cancer per record  BMI 35.30        "

## 2024-02-08 LAB
ATRIAL RATE: 61 BPM
P AXIS: 58 DEGREES
P OFFSET: 165 MS
P ONSET: 117 MS
PR INTERVAL: 198 MS
Q ONSET: 216 MS
QRS COUNT: 10 BEATS
QRS DURATION: 88 MS
QT INTERVAL: 428 MS
QTC CALCULATION(BAZETT): 430 MS
QTC FREDERICIA: 430 MS
R AXIS: -31 DEGREES
STAPHYLOCOCCUS SPEC CULT: NORMAL
T AXIS: 20 DEGREES
T OFFSET: 430 MS
VENTRICULAR RATE: 61 BPM

## 2024-02-09 ENCOUNTER — ANESTHESIA EVENT (OUTPATIENT)
Dept: OPERATING ROOM | Facility: HOSPITAL | Age: 79
End: 2024-02-09
Payer: MEDICARE

## 2024-02-09 ENCOUNTER — DOCUMENTATION (OUTPATIENT)
Dept: PREADMISSION TESTING | Facility: HOSPITAL | Age: 79
End: 2024-02-09
Payer: MEDICARE

## 2024-02-09 PROBLEM — Z96.652 S/P TOTAL KNEE ARTHROPLASTY, LEFT: Status: ACTIVE | Noted: 2024-02-09

## 2024-02-09 NOTE — PROGRESS NOTES
Spoke to Chani in Dr aCruso's office. She was notified that the patient is going to need cardiac clearance before surgery d/t abnormal EKG.   RAYMUNDO Chery-CNP

## 2024-02-12 ENCOUNTER — HOME HEALTH ADMISSION (OUTPATIENT)
Dept: HOME HEALTH SERVICES | Facility: HOME HEALTH | Age: 79
End: 2024-02-12
Payer: MEDICARE

## 2024-02-12 ENCOUNTER — APPOINTMENT (OUTPATIENT)
Dept: RADIOLOGY | Facility: HOSPITAL | Age: 79
DRG: 982 | End: 2024-02-12
Payer: MEDICARE

## 2024-02-12 ENCOUNTER — HOSPITAL ENCOUNTER (OUTPATIENT)
Facility: HOSPITAL | Age: 79
LOS: 1 days | Discharge: HOME | DRG: 982 | End: 2024-02-13
Attending: STUDENT IN AN ORGANIZED HEALTH CARE EDUCATION/TRAINING PROGRAM | Admitting: STUDENT IN AN ORGANIZED HEALTH CARE EDUCATION/TRAINING PROGRAM
Payer: MEDICARE

## 2024-02-12 ENCOUNTER — ANESTHESIA (OUTPATIENT)
Dept: OPERATING ROOM | Facility: HOSPITAL | Age: 79
End: 2024-02-12
Payer: MEDICARE

## 2024-02-12 DIAGNOSIS — Z96.652 S/P TOTAL KNEE ARTHROPLASTY, LEFT: Primary | ICD-10-CM

## 2024-02-12 PROCEDURE — 3600000017 HC OR TIME - EACH INCREMENTAL 1 MINUTE - PROCEDURE LEVEL SIX: Performed by: STUDENT IN AN ORGANIZED HEALTH CARE EDUCATION/TRAINING PROGRAM

## 2024-02-12 PROCEDURE — 3600000018 HC OR TIME - INITIAL BASE CHARGE - PROCEDURE LEVEL SIX: Performed by: STUDENT IN AN ORGANIZED HEALTH CARE EDUCATION/TRAINING PROGRAM

## 2024-02-12 PROCEDURE — 3700000001 HC GENERAL ANESTHESIA TIME - INITIAL BASE CHARGE: Performed by: STUDENT IN AN ORGANIZED HEALTH CARE EDUCATION/TRAINING PROGRAM

## 2024-02-12 PROCEDURE — 2500000001 HC RX 250 WO HCPCS SELF ADMINISTERED DRUGS (ALT 637 FOR MEDICARE OP): Performed by: NURSE PRACTITIONER

## 2024-02-12 PROCEDURE — C1713 ANCHOR/SCREW BN/BN,TIS/BN: HCPCS | Performed by: STUDENT IN AN ORGANIZED HEALTH CARE EDUCATION/TRAINING PROGRAM

## 2024-02-12 PROCEDURE — 97165 OT EVAL LOW COMPLEX 30 MIN: CPT | Mod: GO

## 2024-02-12 PROCEDURE — A27447 PR TOTAL KNEE ARTHROPLASTY: Performed by: STUDENT IN AN ORGANIZED HEALTH CARE EDUCATION/TRAINING PROGRAM

## 2024-02-12 PROCEDURE — 2780000003 HC OR 278 NO HCPCS: Performed by: STUDENT IN AN ORGANIZED HEALTH CARE EDUCATION/TRAINING PROGRAM

## 2024-02-12 PROCEDURE — 7100000002 HC RECOVERY ROOM TIME - EACH INCREMENTAL 1 MINUTE: Performed by: STUDENT IN AN ORGANIZED HEALTH CARE EDUCATION/TRAINING PROGRAM

## 2024-02-12 PROCEDURE — 2500000004 HC RX 250 GENERAL PHARMACY W/ HCPCS (ALT 636 FOR OP/ED): Performed by: STUDENT IN AN ORGANIZED HEALTH CARE EDUCATION/TRAINING PROGRAM

## 2024-02-12 PROCEDURE — 97161 PT EVAL LOW COMPLEX 20 MIN: CPT | Mod: GP

## 2024-02-12 PROCEDURE — 3700000002 HC GENERAL ANESTHESIA TIME - EACH INCREMENTAL 1 MINUTE: Performed by: STUDENT IN AN ORGANIZED HEALTH CARE EDUCATION/TRAINING PROGRAM

## 2024-02-12 PROCEDURE — 7100000011 HC EXTENDED STAY RECOVERY HOURLY - NURSING UNIT

## 2024-02-12 PROCEDURE — 2500000005 HC RX 250 GENERAL PHARMACY W/O HCPCS: Performed by: STUDENT IN AN ORGANIZED HEALTH CARE EDUCATION/TRAINING PROGRAM

## 2024-02-12 PROCEDURE — 2500000001 HC RX 250 WO HCPCS SELF ADMINISTERED DRUGS (ALT 637 FOR MEDICARE OP): Performed by: INTERNAL MEDICINE

## 2024-02-12 PROCEDURE — A6213 FOAM DRG >16<=48 SQ IN W/BDR: HCPCS | Performed by: STUDENT IN AN ORGANIZED HEALTH CARE EDUCATION/TRAINING PROGRAM

## 2024-02-12 PROCEDURE — 7100000001 HC RECOVERY ROOM TIME - INITIAL BASE CHARGE: Performed by: STUDENT IN AN ORGANIZED HEALTH CARE EDUCATION/TRAINING PROGRAM

## 2024-02-12 PROCEDURE — C1776 JOINT DEVICE (IMPLANTABLE): HCPCS | Performed by: STUDENT IN AN ORGANIZED HEALTH CARE EDUCATION/TRAINING PROGRAM

## 2024-02-12 PROCEDURE — 97530 THERAPEUTIC ACTIVITIES: CPT | Mod: GP

## 2024-02-12 PROCEDURE — 0SRD0J9 REPLACEMENT OF LEFT KNEE JOINT WITH SYNTHETIC SUBSTITUTE, CEMENTED, OPEN APPROACH: ICD-10-PCS | Performed by: STUDENT IN AN ORGANIZED HEALTH CARE EDUCATION/TRAINING PROGRAM

## 2024-02-12 PROCEDURE — 73560 X-RAY EXAM OF KNEE 1 OR 2: CPT | Mod: LT

## 2024-02-12 PROCEDURE — 2720000007 HC OR 272 NO HCPCS: Performed by: STUDENT IN AN ORGANIZED HEALTH CARE EDUCATION/TRAINING PROGRAM

## 2024-02-12 PROCEDURE — 64447 NJX AA&/STRD FEMORAL NRV IMG: CPT | Performed by: STUDENT IN AN ORGANIZED HEALTH CARE EDUCATION/TRAINING PROGRAM

## 2024-02-12 PROCEDURE — 2500000001 HC RX 250 WO HCPCS SELF ADMINISTERED DRUGS (ALT 637 FOR MEDICARE OP): Performed by: STUDENT IN AN ORGANIZED HEALTH CARE EDUCATION/TRAINING PROGRAM

## 2024-02-12 PROCEDURE — 99100 ANES PT EXTEME AGE<1 YR&>70: CPT | Performed by: STUDENT IN AN ORGANIZED HEALTH CARE EDUCATION/TRAINING PROGRAM

## 2024-02-12 PROCEDURE — A27447 PR TOTAL KNEE ARTHROPLASTY: Performed by: ANESTHESIOLOGIST ASSISTANT

## 2024-02-12 PROCEDURE — A4649 SURGICAL SUPPLIES: HCPCS | Performed by: STUDENT IN AN ORGANIZED HEALTH CARE EDUCATION/TRAINING PROGRAM

## 2024-02-12 DEVICE — ASYMMETRIC PATELLA
Type: IMPLANTABLE DEVICE | Site: KNEE | Status: FUNCTIONAL
Brand: TRIATHLON

## 2024-02-12 DEVICE — SIMPLEX® HV IS A FAST-SETTING ACRYLIC RESIN FOR USE IN BONE SURGERY. MIXING THE TWO SEPARATE STERILE COMPONENTS PRODUCES A DUCTILE BONE CEMENT WHICH, AFTER HARDENING, FIXES THE IMPLANT AND TRANSFERS STRESSES PRODUCED DURING MOVEMENT EVENLY TO THE BONE. SIMPLEX® HV CEMENT POWDER ALSO CONTAINS INSOLUBLE ZIRCONIUM DIOXIDE AS AN X-RAY CONTRAST MEDIUM. SIMPLEX® HV DOES NOT EMIT A SIGNAL AND DOES NOT POSE A SAFETY RISK IN A MAGNETIC RESONANCE ENVIRONMENT.
Type: IMPLANTABLE DEVICE | Site: KNEE | Status: FUNCTIONAL
Brand: SIMPLEX HV

## 2024-02-12 DEVICE — TIBIAL BEARING INSERT - PS
Type: IMPLANTABLE DEVICE | Site: KNEE | Status: FUNCTIONAL
Brand: TRIATHLON

## 2024-02-12 DEVICE — UNIVERSAL TIBIAL BASEPLATE
Type: IMPLANTABLE DEVICE | Site: KNEE | Status: FUNCTIONAL
Brand: TRIATHLON

## 2024-02-12 DEVICE — POSTERIOR STABILIZED FEMORAL
Type: IMPLANTABLE DEVICE | Site: KNEE | Status: FUNCTIONAL
Brand: TRIATHLON

## 2024-02-12 DEVICE — FEMORAL DISTAL FIXATION PEG
Type: IMPLANTABLE DEVICE | Site: KNEE | Status: FUNCTIONAL
Brand: TRIATHLON

## 2024-02-12 RX ORDER — SODIUM CHLORIDE 9 MG/ML
100 INJECTION, SOLUTION INTRAVENOUS CONTINUOUS
Status: ACTIVE | OUTPATIENT
Start: 2024-02-12 | End: 2024-02-13

## 2024-02-12 RX ORDER — FENTANYL CITRATE 50 UG/ML
25 INJECTION, SOLUTION INTRAMUSCULAR; INTRAVENOUS EVERY 5 MIN PRN
Status: DISCONTINUED | OUTPATIENT
Start: 2024-02-12 | End: 2024-02-12 | Stop reason: HOSPADM

## 2024-02-12 RX ORDER — METOPROLOL SUCCINATE 25 MG/1
25 TABLET, EXTENDED RELEASE ORAL NIGHTLY
Status: DISCONTINUED | OUTPATIENT
Start: 2024-02-12 | End: 2024-02-13 | Stop reason: HOSPADM

## 2024-02-12 RX ORDER — FENTANYL CITRATE 50 UG/ML
INJECTION, SOLUTION INTRAMUSCULAR; INTRAVENOUS AS NEEDED
Status: DISCONTINUED | OUTPATIENT
Start: 2024-02-12 | End: 2024-02-12

## 2024-02-12 RX ORDER — DIPHENHYDRAMINE HYDROCHLORIDE 50 MG/ML
12.5 INJECTION INTRAMUSCULAR; INTRAVENOUS ONCE AS NEEDED
Status: DISCONTINUED | OUTPATIENT
Start: 2024-02-12 | End: 2024-02-12 | Stop reason: HOSPADM

## 2024-02-12 RX ORDER — PROCHLORPERAZINE EDISYLATE 5 MG/ML
5 INJECTION INTRAMUSCULAR; INTRAVENOUS ONCE AS NEEDED
Status: DISCONTINUED | OUTPATIENT
Start: 2024-02-12 | End: 2024-02-12 | Stop reason: HOSPADM

## 2024-02-12 RX ORDER — SODIUM CHLORIDE, SODIUM LACTATE, POTASSIUM CHLORIDE, CALCIUM CHLORIDE 600; 310; 30; 20 MG/100ML; MG/100ML; MG/100ML; MG/100ML
100 INJECTION, SOLUTION INTRAVENOUS CONTINUOUS
Status: DISCONTINUED | OUTPATIENT
Start: 2024-02-12 | End: 2024-02-12 | Stop reason: HOSPADM

## 2024-02-12 RX ORDER — ONDANSETRON HYDROCHLORIDE 2 MG/ML
4 INJECTION, SOLUTION INTRAVENOUS EVERY 8 HOURS PRN
Status: DISCONTINUED | OUTPATIENT
Start: 2024-02-12 | End: 2024-02-13 | Stop reason: HOSPADM

## 2024-02-12 RX ORDER — ASPIRIN 81 MG/1
81 TABLET ORAL 2 TIMES DAILY
Status: DISCONTINUED | OUTPATIENT
Start: 2024-02-12 | End: 2024-02-13 | Stop reason: HOSPADM

## 2024-02-12 RX ORDER — VANCOMYCIN HYDROCHLORIDE 1 G/20ML
INJECTION, POWDER, LYOPHILIZED, FOR SOLUTION INTRAVENOUS AS NEEDED
Status: DISCONTINUED | OUTPATIENT
Start: 2024-02-12 | End: 2024-02-12 | Stop reason: HOSPADM

## 2024-02-12 RX ORDER — LIDOCAINE HYDROCHLORIDE 20 MG/ML
INJECTION, SOLUTION INFILTRATION; PERINEURAL AS NEEDED
Status: DISCONTINUED | OUTPATIENT
Start: 2024-02-12 | End: 2024-02-12

## 2024-02-12 RX ORDER — DEXAMETHASONE SODIUM PHOSPHATE 4 MG/ML
INJECTION, SOLUTION INTRA-ARTICULAR; INTRALESIONAL; INTRAMUSCULAR; INTRAVENOUS; SOFT TISSUE AS NEEDED
Status: DISCONTINUED | OUTPATIENT
Start: 2024-02-12 | End: 2024-02-12

## 2024-02-12 RX ORDER — NALOXONE HYDROCHLORIDE 0.4 MG/ML
0.2 INJECTION, SOLUTION INTRAMUSCULAR; INTRAVENOUS; SUBCUTANEOUS EVERY 5 MIN PRN
Status: DISCONTINUED | OUTPATIENT
Start: 2024-02-12 | End: 2024-02-13 | Stop reason: HOSPADM

## 2024-02-12 RX ORDER — LISINOPRIL 20 MG/1
20 TABLET ORAL NIGHTLY
Status: DISCONTINUED | OUTPATIENT
Start: 2024-02-13 | End: 2024-02-13 | Stop reason: HOSPADM

## 2024-02-12 RX ORDER — CEFAZOLIN SODIUM 2 G/100ML
2 INJECTION, SOLUTION INTRAVENOUS ONCE
Status: DISCONTINUED | OUTPATIENT
Start: 2024-02-12 | End: 2024-02-12 | Stop reason: HOSPADM

## 2024-02-12 RX ORDER — LISINOPRIL 20 MG/1
20 TABLET ORAL DAILY
Status: DISCONTINUED | OUTPATIENT
Start: 2024-02-12 | End: 2024-02-12

## 2024-02-12 RX ORDER — OXYCODONE AND ACETAMINOPHEN 5; 325 MG/1; MG/1
2 TABLET ORAL EVERY 4 HOURS PRN
Status: DISCONTINUED | OUTPATIENT
Start: 2024-02-12 | End: 2024-02-12

## 2024-02-12 RX ORDER — LABETALOL HYDROCHLORIDE 5 MG/ML
5 INJECTION, SOLUTION INTRAVENOUS ONCE AS NEEDED
Status: DISCONTINUED | OUTPATIENT
Start: 2024-02-12 | End: 2024-02-12 | Stop reason: HOSPADM

## 2024-02-12 RX ORDER — METOPROLOL SUCCINATE 25 MG/1
25 TABLET, EXTENDED RELEASE ORAL DAILY
Status: DISCONTINUED | OUTPATIENT
Start: 2024-02-12 | End: 2024-02-12

## 2024-02-12 RX ORDER — ONDANSETRON HYDROCHLORIDE 2 MG/ML
4 INJECTION, SOLUTION INTRAVENOUS ONCE AS NEEDED
Status: COMPLETED | OUTPATIENT
Start: 2024-02-12 | End: 2024-02-12

## 2024-02-12 RX ORDER — ACETAMINOPHEN 325 MG/1
650 TABLET ORAL EVERY 4 HOURS PRN
Status: DISCONTINUED | OUTPATIENT
Start: 2024-02-12 | End: 2024-02-13 | Stop reason: HOSPADM

## 2024-02-12 RX ORDER — TRANEXAMIC ACID 100 MG/ML
INJECTION, SOLUTION INTRAVENOUS AS NEEDED
Status: DISCONTINUED | OUTPATIENT
Start: 2024-02-12 | End: 2024-02-12

## 2024-02-12 RX ORDER — OXYCODONE AND ACETAMINOPHEN 5; 325 MG/1; MG/1
1 TABLET ORAL EVERY 4 HOURS PRN
Status: DISCONTINUED | OUTPATIENT
Start: 2024-02-12 | End: 2024-02-12

## 2024-02-12 RX ORDER — ALBUTEROL SULFATE 0.83 MG/ML
2.5 SOLUTION RESPIRATORY (INHALATION) ONCE AS NEEDED
Status: DISCONTINUED | OUTPATIENT
Start: 2024-02-12 | End: 2024-02-12 | Stop reason: HOSPADM

## 2024-02-12 RX ORDER — ALLOPURINOL 300 MG/1
300 TABLET ORAL NIGHTLY
Status: DISCONTINUED | OUTPATIENT
Start: 2024-02-12 | End: 2024-02-13 | Stop reason: HOSPADM

## 2024-02-12 RX ORDER — ATORVASTATIN CALCIUM 20 MG/1
20 TABLET, FILM COATED ORAL NIGHTLY
Status: DISCONTINUED | OUTPATIENT
Start: 2024-02-12 | End: 2024-02-13 | Stop reason: HOSPADM

## 2024-02-12 RX ORDER — PROPOFOL 10 MG/ML
INJECTION, EMULSION INTRAVENOUS AS NEEDED
Status: DISCONTINUED | OUTPATIENT
Start: 2024-02-12 | End: 2024-02-12

## 2024-02-12 RX ORDER — HYDRALAZINE HYDROCHLORIDE 20 MG/ML
5 INJECTION INTRAMUSCULAR; INTRAVENOUS EVERY 30 MIN PRN
Status: DISCONTINUED | OUTPATIENT
Start: 2024-02-12 | End: 2024-02-12 | Stop reason: HOSPADM

## 2024-02-12 RX ORDER — IPRATROPIUM BROMIDE 0.5 MG/2.5ML
500 SOLUTION RESPIRATORY (INHALATION) AS NEEDED
Status: DISCONTINUED | OUTPATIENT
Start: 2024-02-12 | End: 2024-02-12 | Stop reason: HOSPADM

## 2024-02-12 RX ORDER — FENTANYL CITRATE 50 UG/ML
50 INJECTION, SOLUTION INTRAMUSCULAR; INTRAVENOUS EVERY 5 MIN PRN
Status: DISCONTINUED | OUTPATIENT
Start: 2024-02-12 | End: 2024-02-12 | Stop reason: HOSPADM

## 2024-02-12 RX ORDER — HYDROCODONE BITARTRATE AND ACETAMINOPHEN 5; 325 MG/1; MG/1
2 TABLET ORAL EVERY 4 HOURS PRN
Status: DISCONTINUED | OUTPATIENT
Start: 2024-02-12 | End: 2024-02-12

## 2024-02-12 RX ORDER — ONDANSETRON 4 MG/1
4 TABLET, ORALLY DISINTEGRATING ORAL EVERY 8 HOURS PRN
Status: DISCONTINUED | OUTPATIENT
Start: 2024-02-12 | End: 2024-02-13 | Stop reason: HOSPADM

## 2024-02-12 RX ORDER — SOLIFENACIN SUCCINATE 10 MG/1
10 TABLET, FILM COATED ORAL NIGHTLY
Status: DISCONTINUED | OUTPATIENT
Start: 2024-02-12 | End: 2024-02-13 | Stop reason: HOSPADM

## 2024-02-12 RX ORDER — FENTANYL CITRATE 50 UG/ML
50 INJECTION, SOLUTION INTRAMUSCULAR; INTRAVENOUS ONCE
Status: COMPLETED | OUTPATIENT
Start: 2024-02-12 | End: 2024-02-12

## 2024-02-12 RX ORDER — ONDANSETRON HYDROCHLORIDE 2 MG/ML
INJECTION, SOLUTION INTRAVENOUS AS NEEDED
Status: DISCONTINUED | OUTPATIENT
Start: 2024-02-12 | End: 2024-02-12

## 2024-02-12 RX ORDER — CEFAZOLIN 1 G/1
INJECTION, POWDER, FOR SOLUTION INTRAVENOUS AS NEEDED
Status: DISCONTINUED | OUTPATIENT
Start: 2024-02-12 | End: 2024-02-12

## 2024-02-12 RX ORDER — HYDROCODONE BITARTRATE AND ACETAMINOPHEN 5; 325 MG/1; MG/1
1 TABLET ORAL EVERY 4 HOURS PRN
Status: DISCONTINUED | OUTPATIENT
Start: 2024-02-12 | End: 2024-02-12

## 2024-02-12 RX ORDER — LABETALOL HYDROCHLORIDE 5 MG/ML
INJECTION, SOLUTION INTRAVENOUS AS NEEDED
Status: DISCONTINUED | OUTPATIENT
Start: 2024-02-12 | End: 2024-02-12

## 2024-02-12 RX ORDER — ALLOPURINOL 300 MG/1
300 TABLET ORAL DAILY
Status: DISCONTINUED | OUTPATIENT
Start: 2024-02-12 | End: 2024-02-12

## 2024-02-12 RX ORDER — SODIUM CHLORIDE, SODIUM LACTATE, POTASSIUM CHLORIDE, CALCIUM CHLORIDE 600; 310; 30; 20 MG/100ML; MG/100ML; MG/100ML; MG/100ML
100 INJECTION, SOLUTION INTRAVENOUS CONTINUOUS
Status: DISCONTINUED | OUTPATIENT
Start: 2024-02-12 | End: 2024-02-13 | Stop reason: HOSPADM

## 2024-02-12 RX ORDER — SOLIFENACIN SUCCINATE 10 MG/1
10 TABLET, FILM COATED ORAL DAILY
Status: DISCONTINUED | OUTPATIENT
Start: 2024-02-12 | End: 2024-02-12

## 2024-02-12 RX ORDER — HYDROCODONE BITARTRATE AND ACETAMINOPHEN 7.5; 325 MG/1; MG/1
1 TABLET ORAL EVERY 6 HOURS PRN
Status: DISCONTINUED | OUTPATIENT
Start: 2024-02-12 | End: 2024-02-13 | Stop reason: HOSPADM

## 2024-02-12 RX ORDER — POLYETHYLENE GLYCOL 3350 17 G/17G
17 POWDER, FOR SOLUTION ORAL DAILY
Status: DISCONTINUED | OUTPATIENT
Start: 2024-02-12 | End: 2024-02-13 | Stop reason: HOSPADM

## 2024-02-12 RX ORDER — CEFAZOLIN SODIUM 2 G/100ML
2 INJECTION, SOLUTION INTRAVENOUS EVERY 8 HOURS
Status: COMPLETED | OUTPATIENT
Start: 2024-02-12 | End: 2024-02-13

## 2024-02-12 RX ORDER — MEPERIDINE HYDROCHLORIDE 25 MG/ML
12.5 INJECTION INTRAMUSCULAR; INTRAVENOUS; SUBCUTANEOUS EVERY 10 MIN PRN
Status: DISCONTINUED | OUTPATIENT
Start: 2024-02-12 | End: 2024-02-12 | Stop reason: HOSPADM

## 2024-02-12 RX ADMIN — FENTANYL CITRATE 25 MCG: 50 INJECTION INTRAMUSCULAR; INTRAVENOUS at 11:45

## 2024-02-12 RX ADMIN — ATORVASTATIN CALCIUM 20 MG: 20 TABLET, FILM COATED ORAL at 20:31

## 2024-02-12 RX ADMIN — PROPOFOL 160 MG: 10 INJECTION, EMULSION INTRAVENOUS at 08:26

## 2024-02-12 RX ADMIN — ASPIRIN 81 MG: 81 TABLET, COATED ORAL at 20:31

## 2024-02-12 RX ADMIN — ALLOPURINOL 300 MG: 300 TABLET ORAL at 20:31

## 2024-02-12 RX ADMIN — DEXAMETHASONE SODIUM PHOSPHATE 4 MG: 4 INJECTION, SOLUTION INTRA-ARTICULAR; INTRALESIONAL; INTRAMUSCULAR; INTRAVENOUS; SOFT TISSUE at 10:03

## 2024-02-12 RX ADMIN — ONDANSETRON 4 MG: 2 INJECTION INTRAMUSCULAR; INTRAVENOUS at 12:48

## 2024-02-12 RX ADMIN — FENTANYL CITRATE 50 MCG: 0.05 INJECTION, SOLUTION INTRAMUSCULAR; INTRAVENOUS at 10:51

## 2024-02-12 RX ADMIN — HYDROCODONE BITARTRATE AND ACETAMINOPHEN 1 TABLET: 7.5; 325 TABLET ORAL at 22:35

## 2024-02-12 RX ADMIN — TRANEXAMIC ACID 1000 MG: 100 INJECTION, SOLUTION INTRAVENOUS at 08:28

## 2024-02-12 RX ADMIN — PROPOFOL 40 MG: 10 INJECTION, EMULSION INTRAVENOUS at 10:58

## 2024-02-12 RX ADMIN — SODIUM CHLORIDE 100 ML/HR: 900 INJECTION, SOLUTION INTRAVENOUS at 13:45

## 2024-02-12 RX ADMIN — FENTANYL CITRATE 25 MCG: 0.05 INJECTION, SOLUTION INTRAMUSCULAR; INTRAVENOUS at 08:50

## 2024-02-12 RX ADMIN — LABETALOL HYDROCHLORIDE 5 MG: 5 INJECTION, SOLUTION INTRAVENOUS at 09:07

## 2024-02-12 RX ADMIN — FENTANYL CITRATE 50 MCG: 0.05 INJECTION, SOLUTION INTRAMUSCULAR; INTRAVENOUS at 08:36

## 2024-02-12 RX ADMIN — FENTANYL CITRATE 25 MCG: 50 INJECTION INTRAMUSCULAR; INTRAVENOUS at 11:40

## 2024-02-12 RX ADMIN — FENTANYL CITRATE 50 MCG: 0.05 INJECTION, SOLUTION INTRAMUSCULAR; INTRAVENOUS at 09:01

## 2024-02-12 RX ADMIN — SODIUM CHLORIDE, POTASSIUM CHLORIDE, SODIUM LACTATE AND CALCIUM CHLORIDE: 600; 310; 30; 20 INJECTION, SOLUTION INTRAVENOUS at 08:20

## 2024-02-12 RX ADMIN — TRANEXAMIC ACID 1000 MG: 100 INJECTION, SOLUTION INTRAVENOUS at 10:09

## 2024-02-12 RX ADMIN — FENTANYL CITRATE 50 MCG: 50 INJECTION INTRAMUSCULAR; INTRAVENOUS at 11:12

## 2024-02-12 RX ADMIN — CEFAZOLIN SODIUM 2 G: 2 INJECTION, SOLUTION INTRAVENOUS at 23:01

## 2024-02-12 RX ADMIN — FENTANYL CITRATE 50 MCG: 50 INJECTION INTRAMUSCULAR; INTRAVENOUS at 07:43

## 2024-02-12 RX ADMIN — FENTANYL CITRATE 50 MCG: 50 INJECTION INTRAMUSCULAR; INTRAVENOUS at 11:26

## 2024-02-12 RX ADMIN — ONDANSETRON 4 MG: 2 INJECTION INTRAMUSCULAR; INTRAVENOUS at 16:27

## 2024-02-12 RX ADMIN — FENTANYL CITRATE 50 MCG: 0.05 INJECTION, SOLUTION INTRAMUSCULAR; INTRAVENOUS at 08:56

## 2024-02-12 RX ADMIN — CEFAZOLIN 2 G: 330 INJECTION, POWDER, FOR SOLUTION INTRAMUSCULAR; INTRAVENOUS at 08:28

## 2024-02-12 RX ADMIN — OXYCODONE AND ACETAMINOPHEN 2 TABLET: 5; 325 TABLET ORAL at 19:37

## 2024-02-12 RX ADMIN — LIDOCAINE HYDROCHLORIDE 50 MG: 20 INJECTION, SOLUTION INFILTRATION; PERINEURAL at 08:26

## 2024-02-12 RX ADMIN — ONDANSETRON HYDROCHLORIDE 4 MG: 2 INJECTION INTRAMUSCULAR; INTRAVENOUS at 10:03

## 2024-02-12 RX ADMIN — HYDROCODONE BITARTRATE AND ACETAMINOPHEN 2 TABLET: 5; 325 TABLET ORAL at 15:19

## 2024-02-12 RX ADMIN — FENTANYL CITRATE 25 MCG: 50 INJECTION INTRAMUSCULAR; INTRAVENOUS at 12:12

## 2024-02-12 RX ADMIN — FENTANYL CITRATE 50 MCG: 0.05 INJECTION, SOLUTION INTRAMUSCULAR; INTRAVENOUS at 10:21

## 2024-02-12 RX ADMIN — FENTANYL CITRATE 25 MCG: 0.05 INJECTION, SOLUTION INTRAMUSCULAR; INTRAVENOUS at 08:54

## 2024-02-12 RX ADMIN — CEFAZOLIN SODIUM 2 G: 2 INJECTION, SOLUTION INTRAVENOUS at 16:27

## 2024-02-12 RX ADMIN — METOPROLOL SUCCINATE 25 MG: 25 TABLET, EXTENDED RELEASE ORAL at 20:31

## 2024-02-12 RX ADMIN — FENTANYL CITRATE 25 MCG: 50 INJECTION INTRAMUSCULAR; INTRAVENOUS at 12:20

## 2024-02-12 SDOH — SOCIAL STABILITY: SOCIAL INSECURITY: HAVE YOU HAD THOUGHTS OF HARMING ANYONE ELSE?: NO

## 2024-02-12 SDOH — SOCIAL STABILITY: SOCIAL INSECURITY: WERE YOU ABLE TO COMPLETE ALL THE BEHAVIORAL HEALTH SCREENINGS?: YES

## 2024-02-12 SDOH — HEALTH STABILITY: MENTAL HEALTH: CURRENT SMOKER: 0

## 2024-02-12 SDOH — SOCIAL STABILITY: SOCIAL INSECURITY: ARE YOU OR HAVE YOU BEEN THREATENED OR ABUSED PHYSICALLY, EMOTIONALLY, OR SEXUALLY BY ANYONE?: NO

## 2024-02-12 SDOH — SOCIAL STABILITY: SOCIAL INSECURITY: ARE THERE ANY APPARENT SIGNS OF INJURIES/BEHAVIORS THAT COULD BE RELATED TO ABUSE/NEGLECT?: NO

## 2024-02-12 SDOH — SOCIAL STABILITY: SOCIAL INSECURITY: DO YOU FEEL ANYONE HAS EXPLOITED OR TAKEN ADVANTAGE OF YOU FINANCIALLY OR OF YOUR PERSONAL PROPERTY?: NO

## 2024-02-12 SDOH — SOCIAL STABILITY: SOCIAL INSECURITY: DOES ANYONE TRY TO KEEP YOU FROM HAVING/CONTACTING OTHER FRIENDS OR DOING THINGS OUTSIDE YOUR HOME?: NO

## 2024-02-12 SDOH — SOCIAL STABILITY: SOCIAL INSECURITY: HAS ANYONE EVER THREATENED TO HURT YOUR FAMILY OR YOUR PETS?: NO

## 2024-02-12 SDOH — SOCIAL STABILITY: SOCIAL INSECURITY: DO YOU FEEL UNSAFE GOING BACK TO THE PLACE WHERE YOU ARE LIVING?: NO

## 2024-02-12 SDOH — SOCIAL STABILITY: SOCIAL INSECURITY: ABUSE: ADULT

## 2024-02-12 ASSESSMENT — ENCOUNTER SYMPTOMS
EYES NEGATIVE: 1
ENDOCRINE NEGATIVE: 1
CARDIOVASCULAR NEGATIVE: 1
NAUSEA: 1
CONSTITUTIONAL NEGATIVE: 1
PSYCHIATRIC NEGATIVE: 1
NEUROLOGICAL NEGATIVE: 1
RESPIRATORY NEGATIVE: 1

## 2024-02-12 ASSESSMENT — PAIN DESCRIPTION - ORIENTATION
ORIENTATION: LEFT
ORIENTATION: RIGHT
ORIENTATION: LEFT

## 2024-02-12 ASSESSMENT — COGNITIVE AND FUNCTIONAL STATUS - GENERAL
MOVING TO AND FROM BED TO CHAIR: A LITTLE
DRESSING REGULAR LOWER BODY CLOTHING: A LOT
DAILY ACTIVITIY SCORE: 18
EATING MEALS: A LITTLE
MOBILITY SCORE: 17
MOVING FROM LYING ON BACK TO SITTING ON SIDE OF FLAT BED WITH BEDRAILS: A LITTLE
DRESSING REGULAR UPPER BODY CLOTHING: A LITTLE
TOILETING: A LITTLE
MOVING TO AND FROM BED TO CHAIR: A LITTLE
CLIMB 3 TO 5 STEPS WITH RAILING: A LOT
TURNING FROM BACK TO SIDE WHILE IN FLAT BAD: A LITTLE
DRESSING REGULAR UPPER BODY CLOTHING: A LITTLE
DAILY ACTIVITIY SCORE: 16
STANDING UP FROM CHAIR USING ARMS: A LITTLE
WALKING IN HOSPITAL ROOM: A LITTLE
WALKING IN HOSPITAL ROOM: A LITTLE
CLIMB 3 TO 5 STEPS WITH RAILING: A LOT
MOVING TO AND FROM BED TO CHAIR: A LITTLE
TOILETING: A LITTLE
CLIMB 3 TO 5 STEPS WITH RAILING: A LOT
MOBILITY SCORE: 18
MOBILITY SCORE: 18
WALKING IN HOSPITAL ROOM: A LITTLE
STANDING UP FROM CHAIR USING ARMS: A LITTLE
DRESSING REGULAR LOWER BODY CLOTHING: A LITTLE
PATIENT BASELINE BEDBOUND: NO
PERSONAL GROOMING: A LITTLE
TOILETING: A LITTLE
HELP NEEDED FOR BATHING: A LOT
PERSONAL GROOMING: A LITTLE
DAILY ACTIVITIY SCORE: 19
TURNING FROM BACK TO SIDE WHILE IN FLAT BAD: A LITTLE
HELP NEEDED FOR BATHING: A LITTLE
DRESSING REGULAR LOWER BODY CLOTHING: A LOT
TURNING FROM BACK TO SIDE WHILE IN FLAT BAD: A LITTLE
STANDING UP FROM CHAIR USING ARMS: A LITTLE
DRESSING REGULAR UPPER BODY CLOTHING: A LITTLE
HELP NEEDED FOR BATHING: A LOT

## 2024-02-12 ASSESSMENT — PAIN DESCRIPTION - DESCRIPTORS
DESCRIPTORS: ACHING

## 2024-02-12 ASSESSMENT — PAIN DESCRIPTION - LOCATION
LOCATION: KNEE

## 2024-02-12 ASSESSMENT — PAIN - FUNCTIONAL ASSESSMENT

## 2024-02-12 ASSESSMENT — COLUMBIA-SUICIDE SEVERITY RATING SCALE - C-SSRS
1. IN THE PAST MONTH, HAVE YOU WISHED YOU WERE DEAD OR WISHED YOU COULD GO TO SLEEP AND NOT WAKE UP?: NO
6. HAVE YOU EVER DONE ANYTHING, STARTED TO DO ANYTHING, OR PREPARED TO DO ANYTHING TO END YOUR LIFE?: NO
2. HAVE YOU ACTUALLY HAD ANY THOUGHTS OF KILLING YOURSELF?: NO

## 2024-02-12 ASSESSMENT — ACTIVITIES OF DAILY LIVING (ADL)
JUDGMENT_ADEQUATE_SAFELY_COMPLETE_DAILY_ACTIVITIES: YES
ADL_ASSISTANCE: INDEPENDENT
TOILETING: INDEPENDENT
FEEDING YOURSELF: INDEPENDENT
ADL_ASSISTANCE: INDEPENDENT
HEARING - LEFT EAR: FUNCTIONAL
ADEQUATE_TO_COMPLETE_ADL: YES
BATHING_ASSISTANCE: MODERATE
WALKS IN HOME: INDEPENDENT
PATIENT'S MEMORY ADEQUATE TO SAFELY COMPLETE DAILY ACTIVITIES?: YES
HEARING - RIGHT EAR: FUNCTIONAL
GROOMING: INDEPENDENT
LACK_OF_TRANSPORTATION: NO
BATHING: INDEPENDENT
DRESSING YOURSELF: INDEPENDENT

## 2024-02-12 ASSESSMENT — LIFESTYLE VARIABLES
AUDIT-C TOTAL SCORE: 0
HOW MANY STANDARD DRINKS CONTAINING ALCOHOL DO YOU HAVE ON A TYPICAL DAY: PATIENT DOES NOT DRINK
AUDIT-C TOTAL SCORE: 0
HOW OFTEN DO YOU HAVE A DRINK CONTAINING ALCOHOL: NEVER
SKIP TO QUESTIONS 9-10: 1
HOW OFTEN DO YOU HAVE 6 OR MORE DRINKS ON ONE OCCASION: NEVER

## 2024-02-12 ASSESSMENT — PAIN SCALES - GENERAL
PAINLEVEL_OUTOF10: 3
PAINLEVEL_OUTOF10: 8
PAINLEVEL_OUTOF10: 2
PAINLEVEL_OUTOF10: 6
PAINLEVEL_OUTOF10: 3
PAINLEVEL_OUTOF10: 5 - MODERATE PAIN
PAINLEVEL_OUTOF10: 8
PAINLEVEL_OUTOF10: 7
PAINLEVEL_OUTOF10: 4
PAINLEVEL_OUTOF10: 5 - MODERATE PAIN
PAINLEVEL_OUTOF10: 0 - NO PAIN
PAINLEVEL_OUTOF10: 7
PAINLEVEL_OUTOF10: 4
PAINLEVEL_OUTOF10: 5 - MODERATE PAIN
PAINLEVEL_OUTOF10: 7
PAINLEVEL_OUTOF10: 8

## 2024-02-12 ASSESSMENT — PATIENT HEALTH QUESTIONNAIRE - PHQ9
1. LITTLE INTEREST OR PLEASURE IN DOING THINGS: NOT AT ALL
2. FEELING DOWN, DEPRESSED OR HOPELESS: NOT AT ALL
SUM OF ALL RESPONSES TO PHQ9 QUESTIONS 1 & 2: 0

## 2024-02-12 NOTE — ANESTHESIA PROCEDURE NOTES
Peripheral Block    Patient location during procedure: holding area  Start time: 2/12/2024 7:46 AM  End time: 2/12/2024 7:50 AM  Reason for block: at surgeon's request  Staffing  Performed: attending   Authorized by: Stefano Cleary MD    Performed by: Stefano Cleary MD  Preanesthetic Checklist  Completed: patient identified, IV checked, site marked, risks and benefits discussed, surgical consent, monitors and equipment checked, pre-op evaluation and timeout performed   Timeout performed at: 2/12/2024 7:43 AM  Peripheral Block  Patient position: laying flat  Prep: ChloraPrep  Patient monitoring: heart rate, cardiac monitor and continuous pulse ox  Block type: adductor canal  Laterality: left  Injection technique: single-shot  Guidance: ultrasound guided  Local infiltration: bupivicaine  Infiltration strength: 0.5 %  Dose: 25 mL  Needle  Needle type: long-bevel   Needle gauge: 20 G  Needle length: 10 cm  Needle localization: ultrasound guidance  Assessment  Injection assessment: negative aspiration for heme, no paresthesia on injection, incremental injection and local visualized surrounding nerve on ultrasound (after the first injection, there was heme aspirated, needle was then moved, and confrimed with negative aspiration before the next injection)  Heart rate change: no  Slow fractionated injection: no  Additional Notes  BUPI W/ EPI 1:200,000

## 2024-02-12 NOTE — OP NOTE
Arthroplasty Total Knee (L) Operative Note     Date: 2024  OR Location: [facility]    Name: Avery Huff, : 1945, Age: 79 y.o., MRN: [id number], Sex: male    Diagnosis  Pre-op Diagnosis     * Primary osteoarthritis of left knee [M17.12] Post-op Diagnosis     * Primary osteoarthritis of left knee [M17.12]     Procedures  Arthroplasty Total Knee  01774 - NE ARTHRP KNE CONDYLE&PLATU MEDIAL&LAT COMPARTMENTS      Surgeons      * Bertin Joe - Primary    Resident/Fellow/Other Assistant:  Surgeon(s) and Role:    Procedure Summary  Anesthesia: General  ASA: III  Anesthesia Staff: Anesthesiologist: Stefano Cleary MD  BOB: Charu Rasheed  Anesthesia Break User: JAMIE Parmar  Estimated Blood Loss: 50 mL  Intra-op Medications:   Administrations occurring from 0730 to 1030 on 24:   Medication Name Total Dose   vancomycin (Vancocin) vial for injection 2 g   lactated Ringer's infusion 216.67 mL   fentaNYL PF (Sublimaze) injection 50 mcg 50 mcg              Anesthesia Record               Intraprocedure I/O Totals          Intake    Tranexamic Acid 0.00 mL    The total shown is the total volume documented since Anesthesia Start was filed.    lactated Ringer's infusion 1000.00 mL    Total Intake 1000 mL       Output    Est. Blood Loss 50 mL    Total Output 50 mL       Net    Net Volume 950 mL          Specimen: No specimens collected     Staff:   Circulator: Bertin Baker RN  Relief Circulator: Dani Toussaint RN  Scrub Person: Lulu Horner; Daniela Barbosa RN         Drains and/or Catheters: * None in log *    Tourniquet Times:     Total Tourniquet Time Documented:  Thigh (Left) - 88 minutes  Total: Thigh (Left) - 88 minutes      Implants:  Implants       Type Name Action Serial No.      Implant CEMENT, BONE, SIMPLEX HV FULL DOSE  40 GM - [id number] Implanted      Joint BASEPLATE, TIBIA 6 TS TRIATH - [id number] Implanted      Joint PATELLA, TRIATHLON, ASYMMETRIC X3,  SZ-A35 10MM - [id number] Implanted      Joint PEG, FEMORAL DISTAL FIXATION - [id number] Implanted      Joint FEM COMP, TRIATH RICKY PS P 6 LEFT - [id number] Implanted      Joint INSERT, TIBIAL, X3 TRIATHLON PS, SZ-6 11MM - [id number] Implanted               Findings: See op note    Indications: Avery Huff is an 79 y.o. male who is having surgery for LEFT KNEE OSTEOARTHITIS.  This is a patient who has severe arthritis in the above knee. The patient had difficulty with daily activities and had failed all conservative management. Options were discussed.  The patient desired total knee arthroplasty understanding the risks to include, but are not be limited to: loss of life, loss of limb, need for further surgery, nonunion, malunion, infection, nerve and vessel injury, leg length discrepancy, stiffness, and thromboembolic complication. Informed consent was obtained.    The patient was seen in the preoperative area. The risks, benefits, complications, treatment options, non-operative alternatives, expected recovery and outcomes were discussed with the patient. The possibilities of reaction to medication, pulmonary aspiration, injury to surrounding structures, bleeding, recurrent infection, the need for additional procedures, failure to diagnose a condition, and creating a complication requiring transfusion or operation were discussed with the patient. The patient concurred with the proposed plan, giving informed consent.  The site of surgery was properly noted/marked if necessary per policy. The patient has been actively warmed in preoperative area. Preoperative antibiotics have been ordered and given within 1 hours of incision. Venous thrombosis prophylaxis have been ordered including unilateral sequential compression device    Procedure Details: DESCRIPTION OF PROCEDURE: The patient was transferred to the operative suite after appropriate preoperative preparation and site marking. Preoperative intravenous antibiotics and  tranexamic acid were administered as indicated. Anesthesia was induced.  The knee was prepared in the usual sterile fashion, draped in the usual sterile fashion and incision marked. The tourniquet was inflated to appropriate pressure. Incision was made in the midline. Medial parapatellar approach was utilized. The fat pad was removed, patella was everted, and gentle medial dissection was performed along the proximal tibia. Eburnated bone was noted in more than one compartment. The knee was flexed and the femoral starting point was identified medial to Iwona´s line, anterior to the PCL insertion, and the intramedullary guide was utilized to cut the femur in 5 degrees valgus. Next, the knee was maximally flexed and the posterior cruciate retractor was utilized to retract the tibia forward and the remaining menisci were removed. The intra medullary tibial guide was placed in line with the tibia and was used to cut the tibia at the appropriate depth and slope based on preoperative planning. Soft tissues were protected throughout the cuts.  The bone fragments were removed and tibial osteophytes were removed.  We then placed our size 11 millimeter spacer block in extension which had good varus and valgus stability.  Neck the femoral sizing guide was utilized and showed the listed size above was appropriate size. The finishing cutting guide was then utilized with the axis set at 3 degrees external based on the epicondylar axis. The finishing cuts were made with the soft tissue protected, bone fragments were removed, and remaining femoral osteophytes were removed.  The baseplate tibial trial was then placed in appropriate rotation with the guide just at the medial one third of the tibial tubercle and the punch and peg were used. posterior osteophytes were removed in their entirety along with any other posterior debris.  The trial was placed and femur prepared for the component. Trialing was performed, and after  appropriate soft tissue balancing showed there was excellent equality of flexion/extension gaps and excellent varus/valgus stability throughout a range of motion with the above size spacer. The patella was then treated as indicated above and instrumented based on the amount of patellar wear. Next, the patellar tracking was seen to be excellent with no thumbs.  A lateral release was not necessary.  Next, thorough irrigation was performed and the final implants were cemented into place. The cement was allowed to cure and excess cement was removed. The final spacer was placed. This snapped in quite nicely and, again, motions were excellent and stability was excellent throughout a range of motion from 0 to 125 degrees. Thorough irrigation was performed and the wound was closed using #1 Vicryl sutures in the parapatellar approach along with #1 Stratafix, 2-0 monocryl sutures in the subcutaneous skin, and 3-0 V-Loc used in the cutaneous skin. Prineo dressing with Derma romero was used for wound sealant and sterile dressings were applied when the Dermabond was dry. The patient was awakened and transferred to recovery room in stable condition.     PLAN: The patient will be weightbearing as tolerated on the operative lower extremity.  Aspirin 81 milligrams twice a day for deep vein thrombosis prophylaxis.  Standard postoperative knee replacement protocols will be followed.  Complications:  None; patient tolerated the procedure well.    Disposition: PACU - hemodynamically stable.  Condition: stable         Additional Details: None    Attending Attestation: I performed the procedure.    Bertin Joe  Phone Number: [phone]

## 2024-02-12 NOTE — CONSULTS
Inpatient consult to Medicine  Consult performed by: RAYMUNDO Ramirez-CNP  Consult ordered by: Bertin Joe MD          Reason For Consult  Hypertension and CAD     History Of Present Illness  Avery Huff is a 79 y.o. male presenting with left knee pain. S/P total left knee arthroplasty 2/12/24. Currently, patient resting in bed. Left knee surgical pain controlled with current regimen.  Denies  chest  pain, shortness of breath, abdominal pain, fevers,  chills. Had  some nausea when working with PT earlier.      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    Surgical History  He has a past surgical history that includes Other surgical history (02/15/2022); Other surgical history (02/15/2022); and Cardiac catheterization (2016).     Social History  He reports that he quit smoking about 54 years ago. His smoking use included cigarettes. He has never been exposed to tobacco smoke. He has never used smokeless tobacco. He reports that he does not currently use alcohol. He reports that he does not use drugs.    Family History  Family History   Problem Relation Name Age of Onset    Other (CABG) Mother      Heart attack Father      Heart attack Brother      Other (CABG) Brother      Heart attack Brother      Other (CABG) Brother          Allergies  Eszopiclone, Mirabegron, and Oxybutynin    Review of Systems   Constitutional: Negative.    HENT: Negative.     Eyes: Negative.    Respiratory: Negative.     Cardiovascular: Negative.    Gastrointestinal:  Positive for nausea.   Endocrine: Negative.    Genitourinary: Negative.    Musculoskeletal:         Left knee incisional pain      Skin: Negative.    Neurological:  Negative.    Psychiatric/Behavioral: Negative.          Physical Exam  Constitutional:       Appearance: Normal appearance.   HENT:      Head: Normocephalic and atraumatic.      Mouth/Throat:      Mouth: Mucous membranes are moist.      Pharynx: Oropharynx is clear.   Eyes:      Extraocular Movements: Extraocular movements intact.      Conjunctiva/sclera: Conjunctivae normal.      Pupils: Pupils are equal, round, and reactive to light.   Cardiovascular:      Rate and Rhythm: Normal rate and regular rhythm.      Pulses: Normal pulses.      Heart sounds: Normal heart sounds.   Pulmonary:      Effort: Pulmonary effort is normal.      Breath sounds: Normal breath sounds.   Abdominal:      General: Bowel sounds are normal.      Palpations: Abdomen is soft.      Tenderness: There is no abdominal tenderness.   Musculoskeletal:         General: Normal range of motion.      Cervical back: Normal range of motion and neck supple.      Comments: Left knee dressing intact    Skin:     General: Skin is warm and dry.      Capillary Refill: Capillary refill takes less than 2 seconds.   Neurological:      General: No focal deficit present.      Mental Status: He is alert and oriented to person, place, and time.   Psychiatric:         Mood and Affect: Mood normal.         Behavior: Behavior normal.          Last Recorded Vitals  BP (!) 159/95 (BP Location: Left arm, Patient Position: Lying)   Pulse 88   Temp 36.2 °C (97.2 °F) (Temporal)   Resp 17   Wt 86.2 kg (190 lb)   SpO2 97%     Relevant Results    XR knee left 1-2 views    Result Date: 2/12/2024  Interpreted By:  Cong Agosto, STUDY: XR KNEE LEFT 1-2 VIEWS; 2/12/2024 12:00 pm   INDICATION: Signs/Symptoms:Post-op knee.   COMPARISON: None available.   ACCESSION NUMBER(S): [id number]   ORDERING CLINICIAN: SCAR ROSARIO   TECHNIQUE: AP and lateral views of the left lower extremity.   FINDINGS: Expected postoperative changes of left total knee prosthesis. Normal  appearance of retropatellar, femoral, and tibial components. Expected postoperative changes of the soft tissues. No abnormal radiopaque foreign body.       Expected postoperative changes of left total knee prosthesis.   Signed by: Cong Agosto 2/12/2024 12:09 PM Dictation workstation:   [id number]        Assessment/Plan   Principal Problem:    S/P total knee arthroplasty, left   Surgery 2/12   Pain  management per primary service    DVT prophylaxis per primary service    PT/OT    Advance diet as tolerated    Active Problems:    Benign essential hypertension   Continue lisinopril, Toprol     Coronary arteriosclerosis   Angina free, stable    Continue beta blocker, ASA, statin     Hyperlipidemia   Continue statin     Prostate cancer (CMS/HCC)   Continue Vesicare     - Patient takes medications at bedtime, timing adjusted to adhere to home schedule     Nolvia Geiger APRN-CNP

## 2024-02-12 NOTE — PROGRESS NOTES
Physical Therapy    Physical Therapy Evaluation    Patient Name: Avery Huff  MRN: [id number]  Today's Date: 2/12/2024   Time Calculation  Start Time: 1403  Stop Time: 1430  Time Calculation (min): 27 min    Assessment/Plan   PT Assessment  PT Assessment Results: Decreased strength, Decreased range of motion, Decreased endurance, Impaired balance, Decreased mobility, Decreased safety awareness, Orthopedic restrictions  Rehab Prognosis: Good  Medical Staff Made Aware: Yes  End of Session Communication: Bedside nurse  Assessment Comment: 78 y/o male with decreased mobility due to post-op weakness, impaired balance and decreased L knee ROM due to L TKA. Pt would benefit from cont PT services to maximize safe, indep mobility with use of walker.  End of Session Patient Position: Bed, 3 rail up, Alarm on  IP OR SWING BED PT PLAN  Inpatient or Swing Bed: Inpatient  PT Plan  Treatment/Interventions: Bed mobility, Transfer training, Gait training, Stair training, Balance training, Strengthening, Endurance training, Therapeutic exercise, Range of motion, Therapeutic activity  PT Plan: Skilled PT  PT Frequency: BID  PT Discharge Recommendations: Low intensity level of continued care  Equipment Recommended upon Discharge: Wheeled walker  PT Recommended Transfer Status: Assist x1, Assistive device  PT - OK to Discharge: Yes      Subjective   General Visit Information:  General  Reason for Referral: recent surgery  Past Medical History Relevant to Rehab: OA, BPH, CAD, HLD, prostate CA, skin CA.  Prior to Session Communication: Bedside nurse  Patient Position Received: Bed, 3 rail up, Alarm off, not on at start of session  Preferred Learning Style: verbal  General Comment: 78 y/o male who presents s/p L TKA. Pt is supine in bed with 2L O2, PIV. Agreeable to participate.  Home Living:  Home Living  Type of Home: House  Lives With: Spouse  Home Adaptive Equipment: Walker rolling or standard  Home Layout: Multi-level, Able to live  "on main level with bedroom/bathroom  Home Access: Stairs to enter with rails  Entrance Stairs-Number of Steps: 1  Bathroom Shower/Tub: Walk-in shower  Bathroom Equipment: Grab bars in shower, Built-in shower seat  Home Living Comments: Reported elevated \"lip\" to step over to get into shower.  Lives on first floor. Wife available, however pt is primary caretaker for wife.  Prior Level of Function:  Prior Function Per Pt/Caregiver Report  Level of [place]: Independent with ADLs and functional transfers, Independent with homemaking with ambulation  ADL Assistance: Independent  Homemaking Assistance: Independent  Ambulatory Assistance: Independent  Vocational: Retired  Leisure: golf  Prior Function Comments: Indep with all care and mobility  Precautions:  Precautions  LE Weight Bearing Status: Weight Bearing as Tolerated  Medical Precautions: Fall precautions  Post-Surgical Precautions: Left total knee precautions  Vital Signs:       Objective   Pain:  Pain Assessment  Pain Assessment: 0-10  Pain Score: 3  Pain Type: Surgical pain  Pain Location: Knee  Pain Orientation: Left  Cognition:  Cognition  Overall Cognitive Status: Within Functional Limits    General Assessments:     Activity Tolerance  Endurance: Decreased tolerance for upright activites  Activity Tolerance Comments: nausea and dry heaving while sitting on EOB    Sensation  Light Touch: No apparent deficits    Strength  Strength Comments: LLE grossly 3/5, RLE 5/5          Static Sitting Balance  Static Sitting-Level of Assistance: Close supervision    Static Standing Balance  Static Standing-Level of Assistance: Minimum assistance  Static Standing-Comment/Number of Minutes: UE support of walker  Dynamic Standing Balance  Dynamic Standing-Comments: mod assist with UE support of walker. slightly buckling of L knee with WBing  Functional Assessments:  Bed Mobility  Bed Mobility: Yes  Bed Mobility 1  Bed Mobility 1: Supine to sitting, Sitting to supine  Level " of Assistance 1: Minimum assistance  Bed Mobility Comments 1: HOB elevated    Transfers  Transfer: Yes  Transfer 1  Technique 1: Sit to stand, Stand to sit  Transfer Device 1: Walker  Transfer Level of Assistance 1: Moderate assistance, Moderate verbal cues  Trials/Comments 1: from elevated surface of EOB. Cueing for proper hand and foot placement.    Ambulation/Gait Training  Ambulation/Gait Training Performed: Yes  Ambulation/Gait Training 1  Surface 1: Level tile  Device 1: Rolling walker  Assistance 1: Moderate assistance  Quality of Gait 1:  (decreased stance time/WBing LLE, buckling observed in LLE, minimal foot clearance.')  Comments/Distance (ft) 1: lateral sidesteps  Extremity/Trunk Assessments:  RLE   RLE : Within Functional Limits  LLE   LLE : Exceptions to WFL  AROM LLE (degrees)  LLE AROM Comment: L knee about 60 degrees flex sitting on EOB  Strength LLE  LLE Overall Strength:  (L LAQ completed. Observed buckling with WBing)    Treatment  Performed B ankle pumps. X 5 L heel slides with quad sets into extension. X 8 L LAQs sitting on EOB with knee flex. Instructed pt on proper sequencing with walker. Sat on EOB about 10 minutes during session; nausea and dry heaving, instructed on pursed-lip breathing.    Outcome Measures:  [facility] Basic Mobility  Turning from your back to your side while in a flat bed without using bedrails: A little  Moving from lying on your back to sitting on the side of a flat bed without using bedrails: A little  Moving to and from bed to chair (including a wheelchair): A little  Standing up from a chair using your arms (e.g. wheelchair or bedside chair): A little  To walk in hospital room: A little  Climbing 3-5 steps with railing: A lot  Basic Mobility - Total Score: 17    Encounter Problems       Encounter Problems (Active)       Balance       dynamic (Progressing)       Start:  02/12/24    Expected End:  02/19/24       No LOB with dynamic activities            Mobility       LTG  - Patient will navigate 4-6 steps with rails/device (Progressing)       Start:  02/12/24    Expected End:  02/19/24            ambulation (Progressing)       Start:  02/12/24    Expected End:  02/19/24       Pt will amb > 150  ft with wheeled walker and mod independence          ROM (Progressing)       Start:  02/12/24    Expected End:  02/19/24       L knee ROM 90 degrees flex            Safety       precautions (Progressing)       Start:  02/12/24    Expected End:  02/19/24       Pt will maintain TKA precautions with all mobility            Transfers       sit to stand (Progressing)       Start:  02/12/24    Expected End:  02/19/24       Pt will perform sit to stand transfer with walker and mod independence.                 Education Documentation  Precautions, taught by Carmen Hart PT at 2/12/2024  3:11 PM.  Learner: Patient  Readiness: Acceptance  Method: Explanation  Response: Verbalizes Understanding    Body Mechanics, taught by Carmen Hart PT at 2/12/2024  3:11 PM.  Learner: Patient  Readiness: Acceptance  Method: Explanation  Response: Verbalizes Understanding    Mobility Training, taught by Carmen Hart PT at 2/12/2024  3:11 PM.  Learner: Patient  Readiness: Acceptance  Method: Explanation  Response: Verbalizes Understanding    Education Comments  No comments found.

## 2024-02-12 NOTE — CARE PLAN
Problem: Balance  Goal: dynamic  Description: No LOB with dynamic activities  Outcome: Progressing     Problem: Mobility  Goal: LTG - Patient will navigate 4-6 steps with rails/device  Outcome: Progressing  Goal: ambulation  Description: Pt will amb > 150  ft with wheeled walker and mod independence   Outcome: Progressing  Goal: ROM  Description: L knee ROM 90 degrees flex  Outcome: Progressing     Problem: Safety  Goal: precautions  Description: Pt will maintain TKA precautions with all mobility  Outcome: Progressing     Problem: Transfers  Goal: sit to stand  Description: Pt will perform sit to stand transfer with walker and mod independence.   Outcome: Progressing

## 2024-02-12 NOTE — PROGRESS NOTES
Occupational Therapy    Evaluation    Patient Name: Avery Huff  MRN: [id number]  Today's Date: 2/12/2024  Time Calculation  Start Time: 1403  Stop Time: 1428  Time Calculation (min): 25 min    Assessment  IP OT Assessment  OT Assessment: 80 y/o male s/p L TKA.  On eval, he requires increased assist for xfers, mobility and self care d/t decreased strength, balance and activity tolerance post op.  Skilled OT services are required to maximize safety/IND prior to DC  Prognosis: Excellent  Barriers to Discharge: None  Evaluation/Treatment Tolerance: Patient tolerated treatment well  Medical Staff Made Aware: Yes  End of Session Communication: Bedside nurse  End of Session Patient Position: Bed, 3 rail up, Alarm on  Plan:  Treatment Interventions: ADL retraining, Functional transfer training, Endurance training, UE strengthening/ROM, Patient/family training, Equipment evaluation/education, Compensatory technique education  OT Frequency: 4 times per week  OT Discharge Recommendations: Low intensity level of continued care  Equipment Recommended upon Discharge: Wheeled walker  OT Recommended Transfer Status: Assist of 2  OT - OK to Discharge: Yes    Subjective   Current Problem:  1. S/P total knee arthroplasty, left  Referral to Home Health        General:  General  Reason for Referral: Decreased ADLs  Referred By: Dr. Joe  Past Medical History Relevant to Rehab: OA, BPH, CAD, HLD, prostate CA, skin CA.  Family/Caregiver Present: No  Co-Treatment: PT  Co-Treatment Reason: for safe mobility  Prior to Session Communication: Bedside nurse  Patient Position Received: Bed, 3 rail up, Alarm on  Preferred Learning Style: verbal  General Comment: Cleared by nsg, pt met in supine, agreeable to OT session  Precautions:  LE Weight Bearing Status: Weight Bearing as Tolerated (LLE)  Medical Precautions: Fall precautions, Oxygen therapy device and L/min  Post-Surgical Precautions: Left total knee precautions    Pain:  Pain  "Assessment  Pain Assessment: 0-10  Pain Score: 3  Pain Type: Surgical pain  Pain Location: Knee  Pain Orientation: Left    Objective   Cognition:  Overall Cognitive Status: Within Functional Limits    Home Living:  Type of Home: House  Lives With: Spouse  Home Adaptive Equipment: Walker rolling or standard  Home Layout: Multi-level, Able to live on main level with bedroom/bathroom  Home Access: Stairs to enter with rails  Entrance Stairs-Number of Steps: 1  Bathroom Shower/Tub: Walk-in shower (reports \"there is still a lip I have to step over\")  Bathroom Equipment: Grab bars in shower, Built-in shower seat  Home Living Comments: pt is the primary caregiver to his spouse     Prior Function:  Level of [place]: Independent with ADLs and functional transfers, Independent with homemaking with ambulation  Receives Help From: Family  ADL Assistance: Independent  Homemaking Assistance: Independent  Ambulatory Assistance: Independent  Vocational: Retired  Leisure: golf  Hand Dominance: Right  Prior Function Comments: Indep with all care and mobility    ADL:  Eating Assistance: Stand by  Eating Deficit: Setup  Grooming Assistance: Stand by  Grooming Deficit: Setup  Bathing Assistance: Moderate  UE Dressing Assistance: Minimal  UE Dressing Deficit: Pull around back  LE Dressing Assistance: Maximal  Toileting Assistance with Device: Maximal    Activity Tolerance:  Endurance: Decreased tolerance for upright activites  Activity Tolerance Comments: on 2L post op. limited by post nausea    Bed Mobility/Transfers:   Bed Mobility  Bed Mobility: Yes  Bed Mobility 1  Bed Mobility 1: Supine to sitting  Level of Assistance 1: Minimum assistance  Bed Mobility Comments 1: HOB elevated. increased effort required  Bed Mobility 2  Bed Mobility  2: Sitting to supine  Level of Assistance 2: Minimum assistance  Bed Mobility Comments 2: to elevate LLE into supine    Transfers  Transfer: Yes  Transfer 1  Technique 1: Sit to stand, Stand to " sit  Transfer Device 1: Walker  Transfer Level of Assistance 1: Moderate assistance  Trials/Comments 1: mod A x2 for STS from EOB with Lt knee blocking required. cued for hand placement, weight shifting, walker mgmt  Transfers 2  Technique 2: To right  Transfer Device 2: Walker  Transfer Level of Assistance 2: Moderate assistance, +2  Trials/Comments 2: mod A x2 for ~3 lateral steps towards HOB, tends to shuffle LEs vs actually side stepping, minimal weight bearing through LLE this date d/t pain and buckling    Vision: Vision - Basic Assessment  Current Vision: No visual deficits  Sensation:  Light Touch: No apparent deficits  Strength:  Strength Comments: BUEs at least >/= 3/5, observed in function  Hand Function:  Hand Function  Gross Grasp: Functional  Coordination: Functional  Extremities: RUE   RUE : Within Functional Limits and LUE   LUE: Within Functional Limits    Outcome Measures: [facility] Daily Activity  Putting on and taking off regular lower body clothing: A lot  Bathing (including washing, rinsing, drying): A lot  Putting on and taking off regular upper body clothing: A little  Toileting, which includes using toilet, bedpan or urinal: A little  Taking care of personal grooming such as brushing teeth: A little  Eating Meals: A little  Daily Activity - Total Score: 16      Education Documentation  Body Mechanics, taught by Mir Moore OT at 2/12/2024  3:32 PM.  Learner: Patient  Readiness: Acceptance  Method: Explanation  Response: Needs Reinforcement    Precautions, taught by Mir Moore OT at 2/12/2024  3:32 PM.  Learner: Patient  Readiness: Acceptance  Method: Explanation  Response: Needs Reinforcement    ADL Training, taught by Mir Moore OT at 2/12/2024  3:32 PM.  Learner: Patient  Readiness: Acceptance  Method: Explanation  Response: Needs Reinforcement    Education Comments  No comments found.      Goals:   Encounter Problems       Encounter Problems (Active)       OT Goals        ADLs (Progressing)       Start:  02/12/24    Expected End:  02/26/24       Patient will perform ADLs at MOD I using AE/compensatory techniques as needed.          Functional Transfers (Progressing)       Start:  02/12/24    Expected End:  02/26/24       Patient will complete bed, chair, toilet and shower transfers with supervision using LRD.          Precautions (Progressing)       Start:  02/12/24    Expected End:  02/26/24       Patient will independently verbalize post op precautions in preparation for ADLs.

## 2024-02-12 NOTE — ANESTHESIA PREPROCEDURE EVALUATION
Patient: Avery Huff    Procedure Information       Date/Time: 02/12/24 0730    Procedure: Arthroplasty Total Knee (Left: Knee) - MIGUEL    Location: [facility] / [facility]    Surgeons: Bertin Joe MD            Relevant Problems   Anesthesia (within normal limits)      Cardiovascular   (+) Benign essential hypertension   (+) Coronary arteriosclerosis   (+) Heart failure (CMS/HCC)   (+) Hyperlipidemia   (-) History of coronary artery bypass graft   (-) Pacemaker      Endocrine   (-) Diabetes mellitus, type 2 (CMS/HCC)      /Renal   (+) Renal cyst   (+) Renal stone      Neuro/Psych   (-) CVA (cerebral vascular accident) (CMS/[facility])   (-) Seizures (CMS/HCC)      Pulmonary   (-) Asthma   (-) Chronic obstructive pulmonary disease (CMS/[facility])   (-) DAVIN (obstructive sleep apnea)      Hematology   (-) Coagulopathy (CMS/[facility])       Clinical information reviewed:   Tobacco  Allergies  Meds  Problems  Med Hx  Surg Hx   Fam Hx  Soc   Hx        NPO Detail:  NPO/Void Status  Date of Last Liquid: 02/11/24  Time of Last Liquid: 2300  Date of Last Solid: 02/11/24  Time of Last Solid: 2000         Physical Exam    Airway  Mallampati: II  TM distance: >3 FB  Neck ROM: full     Cardiovascular    Dental    Pulmonary    Abdominal            Anesthesia Plan    History of general anesthesia?: yes  History of complications of general anesthesia?: no    ASA 3     general and regional   (Cardiology clearance Dr. Edgar 2/9/24)  The patient is not a current smoker.    intravenous induction   Postoperative administration of opioids is intended.  Anesthetic plan and risks discussed with patient.  Use of blood products discussed with patient who consented to blood products.

## 2024-02-12 NOTE — ANESTHESIA PROCEDURE NOTES
Airway  Date/Time: 2/12/2024 8:26 AM  Urgency: elective    Airway not difficult    Staffing  Performed: [facility]   Authorized by: Stefano Cleary MD    Performed by: Stefano Cleary MD  Patient location during procedure: OR    Indications and Patient Condition  Indications for airway management: anesthesia  Spontaneous ventilation: present  Sedation level: deep  Preoxygenated: yes  Mask difficulty assessment: 0 - not attempted    Final Airway Details  Final airway type: supraglottic airway      Successful airway: classic  Size 4     Number of attempts at approach: 1

## 2024-02-12 NOTE — ANESTHESIA POSTPROCEDURE EVALUATION
Patient: Avery Huff    Procedure Summary       Date: 02/12/24 Room / Location: [facility] / [facility]    Anesthesia Start: 0820 Anesthesia Stop: 1059    Procedure: Arthroplasty Total Knee (Left: Knee) Diagnosis:       Primary osteoarthritis of left knee      (LEFT KNEE OSTEOARTHITIS)    Surgeons: Bertin Joe MD Responsible Provider: Stefano Cleary MD    Anesthesia Type: general, regional ASA Status: 3            Anesthesia Type: general, regional    Vitals Value Taken Time   /87 02/12/24 1305   Temp 36.4 °C (97.5 °F) 02/12/24 1245   Pulse 84 02/12/24 1305   Resp 15 02/12/24 1305   SpO2 96 % 02/12/24 1305   Vitals shown include unvalidated device data.    Anesthesia Post Evaluation    Patient location during evaluation: PACU  Patient participation: complete - patient participated  Level of consciousness: awake  Pain management: adequate  Multimodal analgesia pain management approach  Airway patency: patent  Cardiovascular status: acceptable and hemodynamically stable  Respiratory status: acceptable and spontaneous ventilation  Hydration status: euvolemic  Postoperative Nausea and Vomiting: none  Comments: No nausea or vomiting        There were no known notable events for this encounter.

## 2024-02-13 ENCOUNTER — APPOINTMENT (OUTPATIENT)
Dept: CARDIOLOGY | Facility: HOSPITAL | Age: 79
DRG: 982 | End: 2024-02-13
Payer: MEDICARE

## 2024-02-13 ENCOUNTER — DOCUMENTATION (OUTPATIENT)
Dept: HOME HEALTH SERVICES | Facility: HOME HEALTH | Age: 79
End: 2024-02-13
Payer: MEDICARE

## 2024-02-13 ENCOUNTER — PHARMACY VISIT (OUTPATIENT)
Dept: PHARMACY | Facility: CLINIC | Age: 79
End: 2024-02-13

## 2024-02-13 VITALS
OXYGEN SATURATION: 96 % | BODY MASS INDEX: 27.2 KG/M2 | WEIGHT: 190 LBS | SYSTOLIC BLOOD PRESSURE: 135 MMHG | HEART RATE: 79 BPM | DIASTOLIC BLOOD PRESSURE: 64 MMHG | HEIGHT: 70 IN | TEMPERATURE: 99.3 F | RESPIRATION RATE: 17 BRPM

## 2024-02-13 LAB
ANION GAP SERPL CALC-SCNC: 11 MMOL/L
BUN SERPL-MCNC: 24 MG/DL (ref 8–25)
CALCIUM SERPL-MCNC: 9 MG/DL (ref 8.5–10.4)
CHLORIDE SERPL-SCNC: 103 MMOL/L (ref 97–107)
CO2 SERPL-SCNC: 22 MMOL/L (ref 24–31)
CREAT SERPL-MCNC: 1.3 MG/DL (ref 0.4–1.6)
EGFRCR SERPLBLD CKD-EPI 2021: 55 ML/MIN/1.73M*2
ERYTHROCYTE [DISTWIDTH] IN BLOOD BY AUTOMATED COUNT: 13.5 % (ref 11.5–14.5)
GLUCOSE SERPL-MCNC: 139 MG/DL (ref 65–99)
HCT VFR BLD AUTO: 38.9 % (ref 41–52)
HGB BLD-MCNC: 13 G/DL (ref 13.5–17.5)
MCH RBC QN AUTO: 32.5 PG (ref 26–34)
MCHC RBC AUTO-ENTMCNC: 33.4 G/DL (ref 32–36)
MCV RBC AUTO: 97 FL (ref 80–100)
NRBC BLD-RTO: 0 /100 WBCS (ref 0–0)
PLATELET # BLD AUTO: 212 X10*3/UL (ref 150–450)
POTASSIUM SERPL-SCNC: 4.4 MMOL/L (ref 3.4–5.1)
RBC # BLD AUTO: 4 X10*6/UL (ref 4.5–5.9)
SODIUM SERPL-SCNC: 136 MMOL/L (ref 133–145)
WBC # BLD AUTO: 12 X10*3/UL (ref 4.4–11.3)

## 2024-02-13 PROCEDURE — 2500000001 HC RX 250 WO HCPCS SELF ADMINISTERED DRUGS (ALT 637 FOR MEDICARE OP): Performed by: STUDENT IN AN ORGANIZED HEALTH CARE EDUCATION/TRAINING PROGRAM

## 2024-02-13 PROCEDURE — 2500000005 HC RX 250 GENERAL PHARMACY W/O HCPCS: Performed by: STUDENT IN AN ORGANIZED HEALTH CARE EDUCATION/TRAINING PROGRAM

## 2024-02-13 PROCEDURE — RXMED WILLOW AMBULATORY MEDICATION CHARGE

## 2024-02-13 PROCEDURE — 2500000004 HC RX 250 GENERAL PHARMACY W/ HCPCS (ALT 636 FOR OP/ED): Performed by: STUDENT IN AN ORGANIZED HEALTH CARE EDUCATION/TRAINING PROGRAM

## 2024-02-13 PROCEDURE — 97116 GAIT TRAINING THERAPY: CPT | Mod: GP,CQ

## 2024-02-13 PROCEDURE — 36415 COLL VENOUS BLD VENIPUNCTURE: CPT | Performed by: STUDENT IN AN ORGANIZED HEALTH CARE EDUCATION/TRAINING PROGRAM

## 2024-02-13 PROCEDURE — 97110 THERAPEUTIC EXERCISES: CPT | Mod: GP,CQ

## 2024-02-13 PROCEDURE — 2500000001 HC RX 250 WO HCPCS SELF ADMINISTERED DRUGS (ALT 637 FOR MEDICARE OP): Performed by: INTERNAL MEDICINE

## 2024-02-13 PROCEDURE — 80048 BASIC METABOLIC PNL TOTAL CA: CPT | Performed by: NURSE PRACTITIONER

## 2024-02-13 PROCEDURE — 7100000011 HC EXTENDED STAY RECOVERY HOURLY - NURSING UNIT

## 2024-02-13 PROCEDURE — 93005 ELECTROCARDIOGRAM TRACING: CPT

## 2024-02-13 PROCEDURE — 97535 SELF CARE MNGMENT TRAINING: CPT | Mod: GO,CO

## 2024-02-13 PROCEDURE — 85027 COMPLETE CBC AUTOMATED: CPT | Performed by: STUDENT IN AN ORGANIZED HEALTH CARE EDUCATION/TRAINING PROGRAM

## 2024-02-13 RX ORDER — HYDROCODONE BITARTRATE AND ACETAMINOPHEN 7.5; 325 MG/1; MG/1
1 TABLET ORAL EVERY 6 HOURS PRN
Qty: 40 TABLET | Refills: 0 | Status: SHIPPED | OUTPATIENT
Start: 2024-02-13 | End: 2024-02-13 | Stop reason: SDUPTHER

## 2024-02-13 RX ORDER — ASPIRIN 81 MG/1
81 TABLET ORAL 2 TIMES DAILY
Qty: 60 TABLET | Refills: 0 | Status: SHIPPED | OUTPATIENT
Start: 2024-02-13 | End: 2024-02-13 | Stop reason: SDUPTHER

## 2024-02-13 RX ORDER — ASPIRIN 81 MG/1
81 TABLET ORAL 2 TIMES DAILY
Qty: 60 TABLET | Refills: 0 | Status: SHIPPED | OUTPATIENT
Start: 2024-02-13 | End: 2024-03-20

## 2024-02-13 RX ORDER — HYDROCODONE BITARTRATE AND ACETAMINOPHEN 7.5; 325 MG/1; MG/1
1 TABLET ORAL EVERY 6 HOURS PRN
Qty: 40 TABLET | Refills: 0 | Status: SHIPPED | OUTPATIENT
Start: 2024-02-13 | End: 2024-03-08 | Stop reason: ALTCHOICE

## 2024-02-13 RX ADMIN — ONDANSETRON 4 MG: 2 INJECTION INTRAMUSCULAR; INTRAVENOUS at 07:46

## 2024-02-13 RX ADMIN — CEFAZOLIN SODIUM 2 G: 2 INJECTION, SOLUTION INTRAVENOUS at 09:31

## 2024-02-13 RX ADMIN — POLYETHYLENE GLYCOL 3350 17 G: 17 POWDER, FOR SOLUTION ORAL at 09:30

## 2024-02-13 RX ADMIN — HYDROCODONE BITARTRATE AND ACETAMINOPHEN 1 TABLET: 7.5; 325 TABLET ORAL at 11:09

## 2024-02-13 RX ADMIN — ASPIRIN 81 MG: 81 TABLET, COATED ORAL at 09:30

## 2024-02-13 RX ADMIN — ONDANSETRON 4 MG: 4 TABLET, ORALLY DISINTEGRATING ORAL at 15:27

## 2024-02-13 RX ADMIN — HYDROCODONE BITARTRATE AND ACETAMINOPHEN 1 TABLET: 7.5; 325 TABLET ORAL at 04:59

## 2024-02-13 ASSESSMENT — COGNITIVE AND FUNCTIONAL STATUS - GENERAL
DRESSING REGULAR LOWER BODY CLOTHING: A LITTLE
MOVING FROM LYING ON BACK TO SITTING ON SIDE OF FLAT BED WITH BEDRAILS: A LITTLE
TURNING FROM BACK TO SIDE WHILE IN FLAT BAD: A LITTLE
CLIMB 3 TO 5 STEPS WITH RAILING: A LITTLE
STANDING UP FROM CHAIR USING ARMS: A LITTLE
TURNING FROM BACK TO SIDE WHILE IN FLAT BAD: A LITTLE
TOILETING: A LITTLE
CLIMB 3 TO 5 STEPS WITH RAILING: A LITTLE
STANDING UP FROM CHAIR USING ARMS: A LITTLE
MOBILITY SCORE: 18
HELP NEEDED FOR BATHING: A LITTLE
MOVING TO AND FROM BED TO CHAIR: A LITTLE
DAILY ACTIVITIY SCORE: 21
WALKING IN HOSPITAL ROOM: A LITTLE
MOVING FROM LYING ON BACK TO SITTING ON SIDE OF FLAT BED WITH BEDRAILS: A LITTLE
WALKING IN HOSPITAL ROOM: A LITTLE
MOVING TO AND FROM BED TO CHAIR: A LITTLE
MOBILITY SCORE: 18

## 2024-02-13 ASSESSMENT — PAIN SCALES - GENERAL
PAINLEVEL_OUTOF10: 4
PAINLEVEL_OUTOF10: 3
PAINLEVEL_OUTOF10: 7
PAINLEVEL_OUTOF10: 7
PAINLEVEL_OUTOF10: 5 - MODERATE PAIN
PAINLEVEL_OUTOF10: 5 - MODERATE PAIN
PAINLEVEL_OUTOF10: 8

## 2024-02-13 ASSESSMENT — PAIN - FUNCTIONAL ASSESSMENT
PAIN_FUNCTIONAL_ASSESSMENT: 0-10

## 2024-02-13 ASSESSMENT — PAIN DESCRIPTION - LOCATION: LOCATION: KNEE

## 2024-02-13 ASSESSMENT — PAIN DESCRIPTION - DESCRIPTORS
DESCRIPTORS: ACHING
DESCRIPTORS: ACHING

## 2024-02-13 ASSESSMENT — PAIN DESCRIPTION - ORIENTATION: ORIENTATION: LEFT

## 2024-02-13 NOTE — PROGRESS NOTES
Physical Therapy    Physical Therapy Treatment    Patient Name: Avery Huff  MRN: [id number]  Today's Date: 2/13/2024  Time Calculation  Start Time: 0720  Stop Time: 0810  Time Calculation (min): 50 min       Assessment/Plan   PT Assessment  Medical Staff Made Aware: Yes  End of Session Communication: Bedside nurse  End of Session Patient Position: Up in chair (Needs at reach)     PT Plan  Treatment/Interventions: Bed mobility, Transfer training, Gait training, Stair training, Balance training, Strengthening, Endurance training, Therapeutic exercise, Range of motion, Therapeutic activity  PT Plan: Skilled PT  PT Frequency: BID  PT Discharge Recommendations: Low intensity level of continued care  Equipment Recommended upon Discharge: Wheeled walker  PT Recommended Transfer Status: Assist x1, Assistive device  PT - OK to Discharge: Yes      General Visit Information:   PT  Visit  PT Received On: 02/13/24  General  Reason for Referral: Impairede mobility  Referred By: Dr. Joe  Past Medical History Relevant to Rehab: OA, BPH, CAD, HLD, prostate CA, skin CA.  Prior to Session Communication: Bedside nurse  Patient Position Received: Bed, 3 rail up, Alarm on  Preferred Learning Style: verbal  General Comment: Cleared by nurse. Patient agreeable to therapy    Subjective   Precautions:  Precautions  LE Weight Bearing Status: Weight Bearing as Tolerated  Medical Precautions: Fall precautions  Post-Surgical Precautions: Left total knee precautions  Vital Signs:       Objective   Pain:  Pain Assessment  Pain Assessment: 0-10  Pain Score: 4  Pain Type: Surgical pain  Pain Location: Knee  Pain Orientation: Left  Pain Descriptors: Aching  Pain Interventions: Cold pack  Cognition:     Postural Control:  Static Sitting Balance  Static Sitting-Level of Assistance: Close supervision  Static Standing Balance  Static Standing-Level of Assistance: Minimum assistance  Static Standing-Comment/Number of Minutes: UE support on  RW  Dynamic Standing Balance  Dynamic Standing-Comments: Min A  Extremity/Trunk Assessments:    Activity Tolerance:     Treatments:  Therapeutic Exercise  Therapeutic Exercise Performed: Yes  Therapeutic Exercise Activity 1: B LE supine ther ex: ankle pumps, quad/ gluteal sets, heelslides, SAQs, hip abduction/adduction, SLRs x 15 Plastic bag under L LE    Bed Mobility  Bed Mobility: Yes  Bed Mobility 1  Bed Mobility 1: Supine to sitting  Level of Assistance 1: Minimum assistance  Bed Mobility Comments 1: HOB elevated with increased effort with cues for safe hand placement    Ambulation/Gait Training  Ambulation/Gait Training Performed: Yes  Ambulation/Gait Training 1  Surface 1: Level tile  Device 1: Rolling walker  Assistance 1: Minimum assistance, Minimal verbal cues  Quality of Gait 1:  (Decreased static stand L LE with decreased weight bearing L LE No L knee buckling noted Mininmal foot clearance L LE)  Comments/Distance (ft) 1: 15' x 1 with RW with slow step to gait pattern with Min A with cues to stay in frame of RW for upright posture  Ambulation/Gait Training 2  Surface 2: Level tile  Device 2: Rolling walker  Assistance 2: Minimum assistance, Minimal verbal cues  Quality of Gait 2:  (Decreased weight bearing L LE No knee buckling wiith minimal footclearance L LE)  Comments/Distance (ft) 2: 40' x 1 with RW with Min A with slow step to gait pattern No buckling L knee with gait or step  Transfers  Transfer: Yes  Transfer 1  Transfer From 1: Bed to  Transfer to 1: Stand  Technique 1: Sit to stand  Transfer Device 1: Walker  Transfer Level of Assistance 1: Minimum assistance, Minimal verbal cues (Cues for safe hand placement)  Trials/Comments 1: x 1 Cues for safe hand placement Cues to sluightly extend L LE with sitting and standing  Transfers 2  Transfer From 2: Stand to  Transfer to 2: Sit, Chair with arms  Technique 2: Stand to sit, Sit to stand  Transfer Device 2: Walker  Transfer Level of Assistance 2:  Minimum assistance, Minimal verbal cues (Cues for safe hand placement)  Trials/Comments 2: x 2 trials STS from bedside chair Cues to slighlty extend L LE with sitting and standing    Stairs  Stairs: Yes  Stairs  Rails 1: None (Comment)  Curb Step 1: Yes  Device 1: Wheeled walker  Assistance 1: Minimum assistance, Minimal verbal cues  Comment/Number of Steps 1: U    Outcome Measures:  [facility] Basic Mobility  Turning from your back to your side while in a flat bed without using bedrails: A little  Moving from lying on your back to sitting on the side of a flat bed without using bedrails: A little  Moving to and from bed to chair (including a wheelchair): A little  Standing up from a chair using your arms (e.g. wheelchair or bedside chair): A little  To walk in hospital room: A little  Climbing 3-5 steps with railing: A little  Basic Mobility - Total Score: 18    Education Documentation  Handouts, taught by Fani Barry PTA at 2/13/2024  8:31 AM.  Learner: Patient  Readiness: Acceptance  Method: Explanation  Response: Verbalizes Understanding    Precautions, taught by Fani Barry PTA at 2/13/2024  8:31 AM.  Learner: Patient  Readiness: Acceptance  Method: Explanation  Response: Verbalizes Understanding    Body Mechanics, taught by Fani Barry PTA at 2/13/2024  8:31 AM.  Learner: Patient  Readiness: Acceptance  Method: Explanation  Response: Verbalizes Understanding    Home Exercise Program, taught by Fani Barry PTA at 2/13/2024  8:31 AM.  Learner: Patient  Readiness: Acceptance  Method: Explanation  Response: Verbalizes Understanding    Mobility Training, taught by Fani Barry PTA at 2/13/2024  8:31 AM.  Learner: Patient  Readiness: Acceptance  Method: Explanation  Response: Verbalizes Understanding    Education Comments  No comments found.        OP EDUCATION:       Encounter Problems       Encounter Problems (Active)       Balance       dynamic (Progressing)       Start:  02/12/24     Expected End:  02/19/24       No LOB with dynamic activities            Mobility       LTG - Patient will navigate 4-6 steps with rails/device (Progressing)       Start:  02/12/24    Expected End:  02/19/24            ambulation (Progressing)       Start:  02/12/24    Expected End:  02/19/24       Pt will amb > 150  ft with wheeled walker and mod independence          ROM (Progressing)       Start:  02/12/24    Expected End:  02/19/24       L knee ROM 90 degrees flex            Safety       precautions (Progressing)       Start:  02/12/24    Expected End:  02/19/24       Pt will maintain TKA precautions with all mobility            Transfers       sit to stand (Progressing)       Start:  02/12/24    Expected End:  02/19/24       Pt will perform sit to stand transfer with walker and mod independence.

## 2024-02-13 NOTE — PROGRESS NOTES
Met with patient bedside.  Discussed discharge planning.  Patient is agreeable to [facility].  DC after 2nd therapy of afternoon.  States he was nauseated this morning.  Was given pills on an empty stomach.  Has had breakfast now.    SOC is 2/14/2024    Mallory Day RN

## 2024-02-13 NOTE — HH CARE COORDINATION
Home Care received a Referral for Physical Therapy. We have processed the referral for a Start of Care on 02/14.     If you have any questions or concerns, please feel free to contact us at [phone]. Follow the prompts, enter your five digit zip code, and you will be directed to your care team on [facility] 1.

## 2024-02-13 NOTE — PROGRESS NOTES
"Avery Huff is a 79 y.o. male on day 1 of admission presenting with S/P total knee arthroplasty, left.    Subjective   Patient seen today.  Pain reasonably controlled.  Worked with physical therapy.       Objective     Physical Exam  Left lower extremity: Postop dressing clean dry and intact no calf tenderness neurovascular intact distally  Last Recorded Vitals  Blood pressure 135/64, pulse 79, temperature 37.4 °C (99.3 °F), temperature source Temporal, resp. rate 17, height 1.778 m (5' 10\"), weight 86.2 kg (190 lb), SpO2 96 %.  Intake/Output last 3 Shifts:  I/O last 3 completed shifts:  In: 2130 (24.7 mL/kg) [P.O.:710; I.V.:1220 (14.2 mL/kg); IV Piggyback:200]  Out: 2125 (24.7 mL/kg) [Urine:2075 (0.7 mL/kg/hr); Blood:50]  Weight: 86.2 kg     Relevant Results      Scheduled medications  allopurinol, 300 mg, oral, Nightly  aspirin, 81 mg, oral, BID  atorvastatin, 20 mg, oral, Nightly  lisinopril, 20 mg, oral, Nightly  metoprolol succinate XL, 25 mg, oral, Nightly  polyethylene glycol, 17 g, oral, Daily  solifenacin, 10 mg, oral, Nightly      Continuous medications  lactated Ringer's, 100 mL/hr, Last Rate: Stopped (02/12/24 1059)  oxygen, 2 L/min, Last Rate: Stopped (02/12/24 1345)  sodium chloride 0.9%, 100 mL/hr, Last Rate: 100 mL/hr (02/13/24 0630)      PRN medications  PRN medications: acetaminophen, HYDROcodone-acetaminophen, naloxone, naloxone, naloxone, ondansetron ODT **OR** ondansetron  Results for orders placed or performed during the hospital encounter of 02/12/24 (from the past 24 hour(s))   CBC   Result Value Ref Range    WBC 12.0 (H) 4.4 - 11.3 x10*3/uL    nRBC 0.0 0.0 - 0.0 /100 WBCs    RBC 4.00 (L) 4.50 - 5.90 x10*6/uL    Hemoglobin 13.0 (L) 13.5 - 17.5 g/dL    Hematocrit 38.9 (L) 41.0 - 52.0 %    MCV 97 80 - 100 fL    MCH 32.5 26.0 - 34.0 pg    MCHC 33.4 32.0 - 36.0 g/dL    RDW 13.5 11.5 - 14.5 %    Platelets 212 150 - 450 x10*3/uL   Basic metabolic panel   Result Value Ref Range    Glucose 139 (H) " 65 - 99 mg/dL    Sodium 136 133 - 145 mmol/L    Potassium 4.4 3.4 - 5.1 mmol/L    Chloride 103 97 - 107 mmol/L    Bicarbonate 22 (L) 24 - 31 mmol/L    Urea Nitrogen 24 8 - 25 mg/dL    Creatinine 1.30 0.40 - 1.60 mg/dL    eGFR 55 (L) >60 mL/min/1.73m*2    Calcium 9.0 8.5 - 10.4 mg/dL    Anion Gap 11 <=19 mmol/L                            Assessment/Plan   Principal Problem:    S/P total knee arthroplasty, left  Active Problems:    Benign essential hypertension    Coronary arteriosclerosis    Hyperlipidemia    Prostate cancer (CMS/HCC)    Postop day 1 from left total knee arthroplasty.  Plan for physical therapy for mobilization and strengthening.  DVT prophylaxis for 30 days.  Pain control.  Plan for discharge home today with home health physical therapy.             Bertin Joe MD

## 2024-02-13 NOTE — PROGRESS NOTES
Physical Therapy    Physical Therapy Treatment    Patient Name: Avery Huff  MRN: [id number]  Today's Date: 2/13/2024  Time Calculation  Start Time: 1300  Stop Time: 1340  Time Calculation (min): 40 min       Assessment/Plan   PT Assessment  Medical Staff Made Aware: Yes  End of Session Communication: Bedside nurse  End of Session Patient Position: Bed, 3 rail up, Alarm on     PT Plan  Treatment/Interventions: Bed mobility, Transfer training, Gait training, Stair training, Balance training, Strengthening, Endurance training, Therapeutic exercise, Range of motion, Therapeutic activity  PT Plan: Skilled PT  PT Frequency: BID  PT Discharge Recommendations: Low intensity level of continued care  Equipment Recommended upon Discharge: Wheeled walker  PT Recommended Transfer Status: Assist x1, Assistive device  PT - OK to Discharge: Yes      General Visit Information:   PT  Visit  PT Received On: 02/13/24  General  Reason for Referral: Impaired mobility  Referred By: Dr. Joe  Past Medical History Relevant to Rehab: OA, BPH, CAD, HLD, prostate CA, skin CA.  Prior to Session Communication: Bedside nurse  Patient Position Received: Bed, 3 rail up, Alarm on  Preferred Learning Style: verbal  General Comment: Cleared by nurse to see. Patient agreeable to therapy    Subjective   Precautions:  Precautions  LE Weight Bearing Status: Weight Bearing as Tolerated  Medical Precautions: Fall precautions  Post-Surgical Precautions: Left total knee precautions  Precautions Comment: Reviewed L knee precautions  Vital Signs:       Objective   Pain:  Pain Assessment  Pain Assessment: 0-10  Pain Score: 5 - Moderate pain  Pain Type: Surgical pain  Pain Location: Knee  Pain Orientation: Left  Pain Descriptors: Aching  Pain Interventions: Cold pack  Cognition:     Postural Control:  Static Sitting Balance  Static Sitting-Level of Assistance: Close supervision  Static Standing Balance  Static Standing-Level of Assistance: Minimum  assistance  Static Standing-Comment/Number of Minutes: UE support on RW  Dynamic Standing Balance  Dynamic Standing-Comments: Min A  Extremity/Trunk Assessments:    Activity Tolerance:     Treatments:  Therapeutic Exercise  Therapeutic Exercise Performed: Yes  Therapeutic Exercise Activity 1: B LE seated ther ex: heel and toe raises, tani hip adduction, resistive hip abduction, hip flexion, LAQs,knee flexion slides x 15    Bed Mobility  Bed Mobility: Yes  Bed Mobility 1  Bed Mobility 1: Supine to sitting  Level of Assistance 1: Close supervision (used lasso to move L LE out of bed)  Bed Mobility Comments 1: HOB elevated Cues for put tension on lasso to move L LE to EOB and over the EOB  Bed Mobility 2  Bed Mobility  2: Sitting to supine  Level of Assistance 2: Close supervision (with lasso to move L LE into bed)  Bed Mobility Comments 2: Cues for tension on lasso to get L LE into bed    Ambulation/Gait Training  Ambulation/Gait Training Performed: Yes  Ambulation/Gait Training 1  Surface 1: Level tile  Device 1: Rolling walker  Assistance 1: Minimum assistance, Minimal verbal cues  Quality of Gait 1:  (Min L foot clearance)  Comments/Distance (ft) 1: 100' x 2 with RW with slow shuffling step to gait pattern with Min A with cues to stay in frame of RW for upright posture. (No L knee buckling)  Ambulation/Gait Training 2  Surface 2: Level tile  Device 2: Rolling walker  Assistance 2: Minimum assistance  Quality of Gait 2:  (No L knee buckling)  Comments/Distance (ft) 2: 20' x 2 into/ out of bathroom with RW with Min A with slow shuffled step to gait pattern (No L knee buckling)  Transfers  Transfer: Yes  Transfer 1  Transfer From 1: Bed to  Transfer to 1: Stand  Technique 1: Sit to stand, Stand to sit  Transfer Device 1: Walker  Transfer Level of Assistance 1: Contact guard  Trials/Comments 1: x 2 STS to/from bedside chair Contact guard assist with cuies for safe hand placement (Cues to slighlty extend L LE with  sitting and standing)  Transfers 2  Transfer From 2: Stand to  Transfer to 2: Sit, Chair with arms  Technique 2: Stand to sit, Sit to stand  Transfer Device 2: Walker (Cues for safe hand placement)  Transfer Level of Assistance 2: Contact guard  Trials/Comments 2: x 2 STS with contact guard assist with cues for safe hand placement Cues to slighlty extend L LE with sitting / standing    Outcome Measures:  [facility] Basic Mobility  Turning from your back to your side while in a flat bed without using bedrails: A little  Moving from lying on your back to sitting on the side of a flat bed without using bedrails: A little  Moving to and from bed to chair (including a wheelchair): A little  Standing up from a chair using your arms (e.g. wheelchair or bedside chair): A little  To walk in hospital room: A little  Climbing 3-5 steps with railing: A little  Basic Mobility - Total Score: 18    Education Documentation  Handouts, taught by Fani Barry PTA at 2/13/2024  2:42 PM.  Learner: Patient  Readiness: Acceptance  Method: Explanation  Response: Verbalizes Understanding, Needs Reinforcement    Precautions, taught by Fani Barry PTA at 2/13/2024  2:42 PM.  Learner: Patient  Readiness: Acceptance  Method: Explanation  Response: Verbalizes Understanding, Needs Reinforcement    Body Mechanics, taught by Fani Barry PTA at 2/13/2024  2:42 PM.  Learner: Patient  Readiness: Acceptance  Method: Explanation  Response: Verbalizes Understanding, Needs Reinforcement    Home Exercise Program, taught by Fani Barry PTA at 2/13/2024  2:42 PM.  Learner: Patient  Readiness: Acceptance  Method: Explanation  Response: Verbalizes Understanding, Needs Reinforcement    Mobility Training, taught by Fani Barry PTA at 2/13/2024  2:42 PM.  Learner: Patient  Readiness: Acceptance  Method: Explanation  Response: Verbalizes Understanding, Needs Reinforcement    Education Comments  No comments found.        OP EDUCATION:        Encounter Problems       Encounter Problems (Active)       Balance       dynamic (Progressing)       Start:  02/12/24    Expected End:  02/19/24       No LOB with dynamic activities            Mobility       LTG - Patient will navigate 4-6 steps with rails/device (Progressing)       Start:  02/12/24    Expected End:  02/19/24            ambulation (Progressing)       Start:  02/12/24    Expected End:  02/19/24       Pt will amb > 150  ft with wheeled walker and mod independence          ROM (Progressing)       Start:  02/12/24    Expected End:  02/19/24       L knee ROM 90 degrees flex            Safety       precautions (Progressing)       Start:  02/12/24    Expected End:  02/19/24       Pt will maintain TKA precautions with all mobility            Transfers       sit to stand (Progressing)       Start:  02/12/24    Expected End:  02/19/24       Pt will perform sit to stand transfer with walker and mod independence.

## 2024-02-13 NOTE — CARE PLAN
The patient's goals for the shift include  pain control and rest    The clinical goals for the shift include pain control and comfort, promote rest, IV antibiotics      02/12/24 at 7:45 PM - Fátima Caba RN

## 2024-02-13 NOTE — DISCHARGE SUMMARY
Discharge Diagnosis  S/P total knee arthroplasty, left    Issues Requiring Follow-Up  Status post left total knee arthroplasty    Test Results Pending At Discharge  Pending Labs       No current pending labs.            Hospital Course   Status post left total knee arthroplasty.  Worked with physical therapy.  Pain controlled with oral pain meds.    Pertinent Physical Exam At Time of Discharge  Physical Exam  Lower extremity: Postop dressing clean dry and intact no calf tenderness neurovascular intact distally  Home Medications     Medication List      ASK your doctor about these medications     allopurinol 300 mg tablet; Commonly known as: Zyloprim   aspirin 81 mg EC tablet   atorvastatin 20 mg tablet; Commonly known as: Lipitor   chlorhexidine 0.12 % solution; Commonly known as: Peridex; Use cap to   measure 15 mL.  Swish/gargle mouthwash for at least 30 seconds.  Do not   swallow.  Use night before surgery after brushing teeth and morning of   surgery after brushing teeth.   lisinopril 20 mg tablet   metoprolol succinate XL 25 mg 24 hr tablet; Commonly known as: Toprol-XL   Nitrostat 0.4 mg SL tablet; Generic drug: nitroglycerin   solifenacin 10 mg tablet; Commonly known as: VESIcare   zolpidem 10 mg tablet; Commonly known as: Ambien; TAKE 1/2 TO 1 TABLET   BY MOUTH  ONCE DAILY AT BEDTIME       Outpatient Follow-Up  Future Appointments   Date Time Provider Department Center   3/4/2024  9:15 AM Cas Trejo PT [email or web address] [facility]   4/30/2024  9:15 AM Maday Koo MD [id number] [facility]   7/11/2024 11:30 AM Mariela Goodson, APRN-CNP [id number] [facility]       Bertin Joe MD

## 2024-02-13 NOTE — PROGRESS NOTES
Occupational Therapy    OT Treatment    Patient Name: Avery Huff  MRN: [id number]  Today's Date: 2/13/2024  Time Calculation  Start Time: 0832  Stop Time: 0910  Time Calculation (min): 38 min         Assessment:     OT Assessment Results: Decreased ADL status, Decreased upper extremity strength, Decreased safe judgment during ADL, Decreased endurance, Decreased functional mobility  Plan:  Treatment Interventions: ADL retraining, Functional transfer training  OT Frequency: 4 times per week  OT Discharge Recommendations: Low intensity level of continued care  Equipment Recommended upon Discharge: Wheeled walker  OT Recommended Transfer Status: Assist of 2  OT - OK to Discharge: Yes  Treatment Interventions: ADL retraining, Functional transfer training    Subjective   Previous Visit Info:  OT Last Visit  OT Received On: 02/13/24  General:  General  Reason for Referral: Impairede mobility  Referred By: Dr. Joe  Past Medical History Relevant to Rehab: OA, BPH, CAD, HLD, prostate CA, skin CA.  Prior to Session Communication: Bedside nurse  General Comment: Pt in chair, agreeable to treatment, reporting nausea  Precautions:  LE Weight Bearing Status: Weight Bearing as Tolerated  Medical Precautions: Fall precautions  Post-Surgical Precautions: Left total knee precautions  Precautions Comment: therapist reviews knee precatuions and provides handout     Pain:  Pain Assessment  Pain Assessment: 0-10  Pain Score: 7  Pain Location: Knee  Pain Orientation: Left  Pain Interventions: Cold pack (back to bed)    Objective       Activities of Daily Living: Grooming  Grooming Level of Assistance: Close supervision  Grooming Where Assessed: Standing sinkside  Grooming Comments: pt brushed teeth, washed face, combed hair    UE Dressing  UE Dressing Level of Assistance: Close supervision  UE Dressing Where Assessed: Other (Comment) (standing sinkside)  UE Dressing Comments: to danya overhead shirt    LE Dressing  Pants Level of  Assistance: Contact guard  Sock Level of Assistance: Close supervision  LE Dressing Where Assessed: Chair  LE Dressing Comments: pt doffed/donned socks , donned pants without use of AE, able to adhere to knee precautions. Reacher provided to intem retrieval off floor surface.  Functional Standing Tolerance:  Time: 1 minute x 2 trials  Activity: self care  Functional Standing Tolerance Comments: fair balance, limited with pain, vommiting at sink  Bed Mobility/Transfers: Bed Mobility 1  Bed Mobility 1: Sitting to supine  Level of Assistance 1: Close supervision  Bed Mobility Comments 1: HOb elevated ~ 30 degrees    Transfer 1  Transfer From 1: Chair with arms to  Transfer to 1: Chair with arms  Technique 1: Sit to stand, Stand to sit  Transfer Device 1: Walker  Transfer Level of Assistance 1: Close supervision  Trials/Comments 1: Functional mobility ~ 10 feet x4, CGA  Transfers 2  Transfer From 2: Chair without arms to  Transfer to 2: Bed  Technique 2: Sit to stand, Stand to sit  Transfer Device 2: Walker  Transfer Level of Assistance 2: Close supervision    Toilet Transfers  Toilet Transfer From: Rolling walker  Toilet Transfer Type: To and from  Toilet Transfer to: Standard toilet  Toilet Transfer Technique: Ambulating  Toilet Transfers: Contact guard  Toilet Transfers Comments: Pt completes simulation transfer with therapist education in use of tilet frame vs reaching back to commode on Rt side to mimic toilet transfer at home.  Shower Transfers  Shower Transfer From: Walker  Shower Transfer to: Standing  Shower Transfer Technique: Ambulating  Shower Transfers: Minimal assistance  Shower Transfers Comments: Pt compeltes simulated shower transfer with therapist instruction in safe hand placement, step sequencing in/out of shower.    Car Transfers  Car Transfers Comments: therapist educates in correct form, hand out provided    Outcome Measures:[facility] Daily Activity  Putting on and taking off regular lower body  clothing: A little  Bathing (including washing, rinsing, drying): A little  Putting on and taking off regular upper body clothing: None  Toileting, which includes using toilet, bedpan or urinal: A little  Taking care of personal grooming such as brushing teeth: None  Eating Meals: None  Daily Activity - Total Score: 21        Education Documentation  Handouts, taught by DAKOTA Paige at 2/13/2024  9:33 AM.  Learner: Patient  Readiness: Acceptance  Method: Explanation, Demonstration, Handout  Response: Demonstrated Understanding, Verbalizes Understanding    Body Mechanics, taught by DAKOTA Paige at 2/13/2024  9:33 AM.  Learner: Patient  Readiness: Acceptance  Method: Explanation, Demonstration, Handout  Response: Demonstrated Understanding, Verbalizes Understanding    Precautions, taught by DAKOTA Paige at 2/13/2024  9:33 AM.  Learner: Patient  Readiness: Acceptance  Method: Explanation, Demonstration, Handout  Response: Demonstrated Understanding, Verbalizes Understanding    ADL Training, taught by DAKOTA Paige at 2/13/2024  9:33 AM.  Learner: Patient  Readiness: Acceptance  Method: Explanation, Demonstration, Handout  Response: Demonstrated Understanding, Verbalizes Understanding    Education Comments  No comments found.      Goals:  Encounter Problems            OT Goals       ADLs (Progressing)       Start:  02/12/24    Expected End:  02/26/24       Patient will perform ADLs at MOD I using AE/compensatory techniques as needed.          Functional Transfers (Progressing)       Start:  02/12/24    Expected End:  02/26/24       Patient will complete bed, chair, toilet and shower transfers with supervision using LRD.          Precautions (Progressing)       Start:  02/12/24    Expected End:  02/26/24       Patient will independently verbalize post op precautions in preparation for ADLs.            Safety       precautions (Progressing)       Start:  02/12/24    Expected End:  02/19/24       Pt will  maintain TKA precautions with all mobility            Transfers       sit to stand (Progressing)       Start:  02/12/24    Expected End:  02/19/24       Pt will perform sit to stand transfer with walker and mod independence.

## 2024-02-14 ENCOUNTER — HOME CARE VISIT (OUTPATIENT)
Dept: HOME HEALTH SERVICES | Facility: HOME HEALTH | Age: 79
End: 2024-02-14
Payer: MEDICARE

## 2024-02-14 VITALS
RESPIRATION RATE: 18 BRPM | DIASTOLIC BLOOD PRESSURE: 50 MMHG | HEART RATE: 88 BPM | TEMPERATURE: 97.3 F | SYSTOLIC BLOOD PRESSURE: 90 MMHG

## 2024-02-14 PROCEDURE — 169592 NO-PAY CLAIM PROCEDURE

## 2024-02-14 PROCEDURE — 1090000001 HH PPS REVENUE CREDIT

## 2024-02-14 PROCEDURE — 0023 HH SOC

## 2024-02-14 PROCEDURE — 1090000002 HH PPS REVENUE DEBIT

## 2024-02-14 PROCEDURE — G0151 HHCP-SERV OF PT,EA 15 MIN: HCPCS | Mod: HHH

## 2024-02-14 SDOH — HEALTH STABILITY: PHYSICAL HEALTH: EXERCISE TYPE: LE THER EX

## 2024-02-14 ASSESSMENT — ACTIVITIES OF DAILY LIVING (ADL)
OASIS_M1830: 05
ENTERING_EXITING_HOME: CONTACT GUARD ASSIST
AMBULATION ASSISTANCE: STAND BY ASSIST
AMBULATION ASSISTANCE: 1
AMBULATION_DISTANCE/DURATION_TOLERATED: 50 FT
AMBULATION ASSISTANCE ON FLAT SURFACES: 1
DRESSING_LB_CURRENT_FUNCTION: MINIMUM ASSIST
CURRENT_FUNCTION: STAND BY ASSIST
PHYSICAL TRANSFERS ASSESSED: 1

## 2024-02-14 ASSESSMENT — ENCOUNTER SYMPTOMS
MUSCLE WEAKNESS: 1
SUBJECTIVE PAIN PROGRESSION: UNCHANGED
PAIN: 1
OCCASIONAL FEELINGS OF UNSTEADINESS: 0
PAIN SEVERITY GOAL: 0/10
LIMITED RANGE OF MOTION: 1
LOWEST PAIN SEVERITY IN PAST 24 HOURS: 3/10
HIGHEST PAIN SEVERITY IN PAST 24 HOURS: 9/10
PAIN LOCATION - PAIN SEVERITY: 7/10
PAIN LOCATION: LEFT KNEE
PERSON REPORTING PAIN: PATIENT

## 2024-02-15 PROCEDURE — 1090000001 HH PPS REVENUE CREDIT

## 2024-02-15 PROCEDURE — 1090000002 HH PPS REVENUE DEBIT

## 2024-02-16 ENCOUNTER — APPOINTMENT (OUTPATIENT)
Dept: CARDIOLOGY | Facility: HOSPITAL | Age: 79
End: 2024-02-16
Payer: MEDICARE

## 2024-02-16 ENCOUNTER — APPOINTMENT (OUTPATIENT)
Dept: RADIOLOGY | Facility: HOSPITAL | Age: 79
End: 2024-02-16
Payer: MEDICARE

## 2024-02-16 ENCOUNTER — HOSPITAL ENCOUNTER (INPATIENT)
Facility: HOSPITAL | Age: 79
LOS: 2 days | Discharge: HOME | DRG: 683 | End: 2024-02-18
Attending: INTERNAL MEDICINE | Admitting: INTERNAL MEDICINE
Payer: MEDICARE

## 2024-02-16 ENCOUNTER — HOSPITAL ENCOUNTER (EMERGENCY)
Facility: HOSPITAL | Age: 79
Discharge: OTHER INSTITUTION | End: 2024-02-16
Attending: FAMILY MEDICINE
Payer: MEDICARE

## 2024-02-16 VITALS
SYSTOLIC BLOOD PRESSURE: 124 MMHG | TEMPERATURE: 99.2 F | DIASTOLIC BLOOD PRESSURE: 52 MMHG | OXYGEN SATURATION: 96 % | BODY MASS INDEX: 27.2 KG/M2 | HEART RATE: 81 BPM | WEIGHT: 190 LBS | HEIGHT: 70 IN | RESPIRATION RATE: 19 BRPM

## 2024-02-16 DIAGNOSIS — R60.9 SWELLING: ICD-10-CM

## 2024-02-16 DIAGNOSIS — R41.0 CONFUSION: ICD-10-CM

## 2024-02-16 DIAGNOSIS — N17.9 ACUTE RENAL FAILURE, UNSPECIFIED ACUTE RENAL FAILURE TYPE (CMS-HCC): ICD-10-CM

## 2024-02-16 DIAGNOSIS — Z74.09 IMPAIRED MOBILITY: ICD-10-CM

## 2024-02-16 DIAGNOSIS — M79.605 ACUTE LEG PAIN, LEFT: ICD-10-CM

## 2024-02-16 DIAGNOSIS — Z96.652 STATUS POST REVISION OF TOTAL KNEE, LEFT: Primary | ICD-10-CM

## 2024-02-16 DIAGNOSIS — M79.662 PAIN AND SWELLING OF LEFT LOWER LEG: ICD-10-CM

## 2024-02-16 DIAGNOSIS — M79.89 PAIN AND SWELLING OF LEFT LOWER LEG: ICD-10-CM

## 2024-02-16 DIAGNOSIS — N17.9 ACUTE RENAL FAILURE (ARF) (CMS-HCC): Primary | ICD-10-CM

## 2024-02-16 DIAGNOSIS — I25.10 CORONARY ARTERIOSCLEROSIS: ICD-10-CM

## 2024-02-16 LAB
ALBUMIN SERPL BCP-MCNC: 3.3 G/DL (ref 3.4–5)
ALP SERPL-CCNC: 75 U/L (ref 33–136)
ALT SERPL W P-5'-P-CCNC: 10 U/L (ref 10–52)
ANION GAP SERPL CALC-SCNC: 17 MMOL/L (ref 10–20)
APPEARANCE UR: ABNORMAL
AST SERPL W P-5'-P-CCNC: 23 U/L (ref 9–39)
ATRIAL RATE: 86 BPM
BASOPHILS # BLD AUTO: 0.03 X10*3/UL (ref 0–0.1)
BASOPHILS NFR BLD AUTO: 0.3 %
BILIRUB SERPL-MCNC: 1 MG/DL (ref 0–1.2)
BILIRUB UR STRIP.AUTO-MCNC: NEGATIVE MG/DL
BNP SERPL-MCNC: 29 PG/ML (ref 0–99)
BUN SERPL-MCNC: 73 MG/DL (ref 6–23)
CALCIUM SERPL-MCNC: 8.7 MG/DL (ref 8.6–10.3)
CARDIAC TROPONIN I PNL SERPL HS: 33 NG/L (ref 0–20)
CARDIAC TROPONIN I PNL SERPL HS: 35 NG/L (ref 0–20)
CHLORIDE SERPL-SCNC: 103 MMOL/L (ref 98–107)
CO2 SERPL-SCNC: 19 MMOL/L (ref 21–32)
COLOR UR: YELLOW
CREAT SERPL-MCNC: 4.08 MG/DL (ref 0.5–1.3)
D DIMER PPP FEU-MCNC: 2445 NG/ML FEU
EGFRCR SERPLBLD CKD-EPI 2021: 14 ML/MIN/1.73M*2
EOSINOPHIL # BLD AUTO: 0.37 X10*3/UL (ref 0–0.4)
EOSINOPHIL NFR BLD AUTO: 3.5 %
ERYTHROCYTE [DISTWIDTH] IN BLOOD BY AUTOMATED COUNT: 13.7 % (ref 11.5–14.5)
GLUCOSE SERPL-MCNC: 102 MG/DL (ref 74–99)
GLUCOSE UR STRIP.AUTO-MCNC: NEGATIVE MG/DL
HCT VFR BLD AUTO: 30.6 % (ref 41–52)
HGB BLD-MCNC: 10 G/DL (ref 13.5–17.5)
IMM GRANULOCYTES # BLD AUTO: 0.07 X10*3/UL (ref 0–0.5)
IMM GRANULOCYTES NFR BLD AUTO: 0.7 % (ref 0–0.9)
KETONES UR STRIP.AUTO-MCNC: NEGATIVE MG/DL
LACTATE SERPL-SCNC: 1.3 MMOL/L (ref 0.4–2)
LEUKOCYTE ESTERASE UR QL STRIP.AUTO: NEGATIVE
LYMPHOCYTES # BLD AUTO: 1.09 X10*3/UL (ref 0.8–3)
LYMPHOCYTES NFR BLD AUTO: 10.5 %
MCH RBC QN AUTO: 32.3 PG (ref 26–34)
MCHC RBC AUTO-ENTMCNC: 32.7 G/DL (ref 32–36)
MCV RBC AUTO: 99 FL (ref 80–100)
MONOCYTES # BLD AUTO: 1.38 X10*3/UL (ref 0.05–0.8)
MONOCYTES NFR BLD AUTO: 13.2 %
NEUTROPHILS # BLD AUTO: 7.49 X10*3/UL (ref 1.6–5.5)
NEUTROPHILS NFR BLD AUTO: 71.8 %
NITRITE UR QL STRIP.AUTO: NEGATIVE
NRBC BLD-RTO: 0 /100 WBCS (ref 0–0)
P AXIS: 25 DEGREES
P OFFSET: 178 MS
P ONSET: 126 MS
PH UR STRIP.AUTO: 5 [PH]
PLATELET # BLD AUTO: 214 X10*3/UL (ref 150–450)
POTASSIUM SERPL-SCNC: 4.3 MMOL/L (ref 3.5–5.3)
PR INTERVAL: 170 MS
PROT SERPL-MCNC: 6.3 G/DL (ref 6.4–8.2)
PROT UR STRIP.AUTO-MCNC: NEGATIVE MG/DL
Q ONSET: 211 MS
QRS COUNT: 14 BEATS
QRS DURATION: 108 MS
QT INTERVAL: 370 MS
QTC CALCULATION(BAZETT): 442 MS
QTC FREDERICIA: 417 MS
R AXIS: -28 DEGREES
RBC # BLD AUTO: 3.1 X10*6/UL (ref 4.5–5.9)
RBC # UR STRIP.AUTO: NEGATIVE /UL
SODIUM SERPL-SCNC: 135 MMOL/L (ref 136–145)
SP GR UR STRIP.AUTO: 1.01
T AXIS: 31 DEGREES
T OFFSET: 396 MS
UROBILINOGEN UR STRIP.AUTO-MCNC: <2 MG/DL
VENTRICULAR RATE: 86 BPM
WBC # BLD AUTO: 10.4 X10*3/UL (ref 4.4–11.3)

## 2024-02-16 PROCEDURE — 96360 HYDRATION IV INFUSION INIT: CPT | Mod: 59

## 2024-02-16 PROCEDURE — 96361 HYDRATE IV INFUSION ADD-ON: CPT

## 2024-02-16 PROCEDURE — 84484 ASSAY OF TROPONIN QUANT: CPT | Performed by: FAMILY MEDICINE

## 2024-02-16 PROCEDURE — 85379 FIBRIN DEGRADATION QUANT: CPT | Performed by: FAMILY MEDICINE

## 2024-02-16 PROCEDURE — 71275 CT ANGIOGRAPHY CHEST: CPT

## 2024-02-16 PROCEDURE — 2500000001 HC RX 250 WO HCPCS SELF ADMINISTERED DRUGS (ALT 637 FOR MEDICARE OP): Performed by: NURSE PRACTITIONER

## 2024-02-16 PROCEDURE — 73564 X-RAY EXAM KNEE 4 OR MORE: CPT | Mod: LT

## 2024-02-16 PROCEDURE — 83880 ASSAY OF NATRIURETIC PEPTIDE: CPT | Performed by: FAMILY MEDICINE

## 2024-02-16 PROCEDURE — 73564 X-RAY EXAM KNEE 4 OR MORE: CPT | Mod: LEFT SIDE | Performed by: STUDENT IN AN ORGANIZED HEALTH CARE EDUCATION/TRAINING PROGRAM

## 2024-02-16 PROCEDURE — 71275 CT ANGIOGRAPHY CHEST: CPT | Performed by: RADIOLOGY

## 2024-02-16 PROCEDURE — 87040 BLOOD CULTURE FOR BACTERIA: CPT | Mod: TRILAB | Performed by: NURSE PRACTITIONER

## 2024-02-16 PROCEDURE — 85025 COMPLETE CBC W/AUTO DIFF WBC: CPT | Performed by: FAMILY MEDICINE

## 2024-02-16 PROCEDURE — 99285 EMERGENCY DEPT VISIT HI MDM: CPT | Mod: 25 | Performed by: FAMILY MEDICINE

## 2024-02-16 PROCEDURE — 1090000001 HH PPS REVENUE CREDIT

## 2024-02-16 PROCEDURE — 2500000004 HC RX 250 GENERAL PHARMACY W/ HCPCS (ALT 636 FOR OP/ED): Performed by: NURSE PRACTITIONER

## 2024-02-16 PROCEDURE — 93971 EXTREMITY STUDY: CPT

## 2024-02-16 PROCEDURE — 83605 ASSAY OF LACTIC ACID: CPT | Performed by: FAMILY MEDICINE

## 2024-02-16 PROCEDURE — 36415 COLL VENOUS BLD VENIPUNCTURE: CPT | Performed by: FAMILY MEDICINE

## 2024-02-16 PROCEDURE — 81003 URINALYSIS AUTO W/O SCOPE: CPT | Performed by: FAMILY MEDICINE

## 2024-02-16 PROCEDURE — 1100000001 HC PRIVATE ROOM DAILY

## 2024-02-16 PROCEDURE — 70450 CT HEAD/BRAIN W/O DYE: CPT

## 2024-02-16 PROCEDURE — 1090000002 HH PPS REVENUE DEBIT

## 2024-02-16 PROCEDURE — 80053 COMPREHEN METABOLIC PANEL: CPT | Performed by: EMERGENCY MEDICINE

## 2024-02-16 PROCEDURE — 93971 EXTREMITY STUDY: CPT | Performed by: RADIOLOGY

## 2024-02-16 PROCEDURE — 84484 ASSAY OF TROPONIN QUANT: CPT | Performed by: EMERGENCY MEDICINE

## 2024-02-16 PROCEDURE — 70450 CT HEAD/BRAIN W/O DYE: CPT | Performed by: STUDENT IN AN ORGANIZED HEALTH CARE EDUCATION/TRAINING PROGRAM

## 2024-02-16 PROCEDURE — 36415 COLL VENOUS BLD VENIPUNCTURE: CPT | Performed by: EMERGENCY MEDICINE

## 2024-02-16 PROCEDURE — 93005 ELECTROCARDIOGRAM TRACING: CPT

## 2024-02-16 PROCEDURE — 2500000004 HC RX 250 GENERAL PHARMACY W/ HCPCS (ALT 636 FOR OP/ED): Performed by: FAMILY MEDICINE

## 2024-02-16 PROCEDURE — 2550000001 HC RX 255 CONTRASTS: Performed by: FAMILY MEDICINE

## 2024-02-16 RX ORDER — ONDANSETRON 4 MG/1
4 TABLET, FILM COATED ORAL EVERY 8 HOURS PRN
Status: CANCELLED | OUTPATIENT
Start: 2024-02-16

## 2024-02-16 RX ORDER — ASPIRIN 81 MG/1
81 TABLET ORAL 2 TIMES DAILY
Status: CANCELLED | OUTPATIENT
Start: 2024-02-16

## 2024-02-16 RX ORDER — DEXTROSE MONOHYDRATE AND SODIUM CHLORIDE 5; .45 G/100ML; G/100ML
125 INJECTION, SOLUTION INTRAVENOUS CONTINUOUS
Status: DISCONTINUED | OUTPATIENT
Start: 2024-02-16 | End: 2024-02-17

## 2024-02-16 RX ORDER — ASPIRIN 81 MG/1
81 TABLET ORAL 2 TIMES DAILY
Status: DISCONTINUED | OUTPATIENT
Start: 2024-02-16 | End: 2024-02-18 | Stop reason: HOSPADM

## 2024-02-16 RX ORDER — ATORVASTATIN CALCIUM 20 MG/1
20 TABLET, FILM COATED ORAL NIGHTLY
Status: DISCONTINUED | OUTPATIENT
Start: 2024-02-16 | End: 2024-02-18 | Stop reason: HOSPADM

## 2024-02-16 RX ORDER — ONDANSETRON 4 MG/1
4 TABLET, ORALLY DISINTEGRATING ORAL EVERY 8 HOURS PRN
Status: DISCONTINUED | OUTPATIENT
Start: 2024-02-16 | End: 2024-02-18 | Stop reason: HOSPADM

## 2024-02-16 RX ORDER — METOPROLOL SUCCINATE 25 MG/1
25 TABLET, EXTENDED RELEASE ORAL NIGHTLY
Status: CANCELLED | OUTPATIENT
Start: 2024-02-16

## 2024-02-16 RX ORDER — METOPROLOL SUCCINATE 25 MG/1
25 TABLET, EXTENDED RELEASE ORAL DAILY
Status: DISCONTINUED | OUTPATIENT
Start: 2024-02-16 | End: 2024-02-18 | Stop reason: HOSPADM

## 2024-02-16 RX ORDER — ATORVASTATIN CALCIUM 10 MG/1
20 TABLET, FILM COATED ORAL NIGHTLY
Status: CANCELLED | OUTPATIENT
Start: 2024-02-16

## 2024-02-16 RX ORDER — DOCUSATE SODIUM 100 MG/1
100 CAPSULE, LIQUID FILLED ORAL 2 TIMES DAILY
Status: DISCONTINUED | OUTPATIENT
Start: 2024-02-16 | End: 2024-02-18 | Stop reason: HOSPADM

## 2024-02-16 RX ORDER — SODIUM CHLORIDE 9 MG/ML
125 INJECTION, SOLUTION INTRAVENOUS CONTINUOUS
Status: DISCONTINUED | OUTPATIENT
Start: 2024-02-16 | End: 2024-02-16 | Stop reason: HOSPADM

## 2024-02-16 RX ORDER — ACETAMINOPHEN 325 MG/1
650 TABLET ORAL EVERY 4 HOURS PRN
Status: DISCONTINUED | OUTPATIENT
Start: 2024-02-16 | End: 2024-02-18 | Stop reason: HOSPADM

## 2024-02-16 RX ORDER — ONDANSETRON HYDROCHLORIDE 2 MG/ML
4 INJECTION, SOLUTION INTRAVENOUS EVERY 8 HOURS PRN
Status: CANCELLED | OUTPATIENT
Start: 2024-02-16

## 2024-02-16 RX ORDER — POLYETHYLENE GLYCOL 3350 17 G/17G
17 POWDER, FOR SOLUTION ORAL DAILY
Status: DISCONTINUED | OUTPATIENT
Start: 2024-02-17 | End: 2024-02-18 | Stop reason: HOSPADM

## 2024-02-16 RX ORDER — LIDOCAINE HYDROCHLORIDE 20 MG/ML
1 JELLY TOPICAL ONCE
Status: DISCONTINUED | OUTPATIENT
Start: 2024-02-16 | End: 2024-02-16 | Stop reason: HOSPADM

## 2024-02-16 RX ORDER — SENNOSIDES 8.6 MG/1
2 TABLET ORAL NIGHTLY PRN
Status: DISCONTINUED | OUTPATIENT
Start: 2024-02-16 | End: 2024-02-18 | Stop reason: HOSPADM

## 2024-02-16 RX ORDER — ONDANSETRON HYDROCHLORIDE 2 MG/ML
4 INJECTION, SOLUTION INTRAVENOUS EVERY 8 HOURS PRN
Status: DISCONTINUED | OUTPATIENT
Start: 2024-02-16 | End: 2024-02-17

## 2024-02-16 RX ORDER — HYDROCODONE BITARTRATE AND ACETAMINOPHEN 5; 325 MG/1; MG/1
1 TABLET ORAL EVERY 6 HOURS PRN
Status: DISCONTINUED | OUTPATIENT
Start: 2024-02-16 | End: 2024-02-18 | Stop reason: HOSPADM

## 2024-02-16 RX ORDER — CEFTRIAXONE 1 G/50ML
1 INJECTION, SOLUTION INTRAVENOUS EVERY 24 HOURS
Status: DISCONTINUED | OUTPATIENT
Start: 2024-02-16 | End: 2024-02-18 | Stop reason: HOSPADM

## 2024-02-16 RX ORDER — ACETAMINOPHEN 325 MG/1
650 TABLET ORAL EVERY 4 HOURS PRN
Status: CANCELLED | OUTPATIENT
Start: 2024-02-16

## 2024-02-16 RX ORDER — DEXTROSE MONOHYDRATE AND SODIUM CHLORIDE 5; .45 G/100ML; G/100ML
125 INJECTION, SOLUTION INTRAVENOUS CONTINUOUS
Status: CANCELLED | OUTPATIENT
Start: 2024-02-16

## 2024-02-16 RX ORDER — POLYETHYLENE GLYCOL 3350 17 G/17G
17 POWDER, FOR SOLUTION ORAL DAILY
Status: CANCELLED | OUTPATIENT
Start: 2024-02-16

## 2024-02-16 RX ADMIN — METOPROLOL SUCCINATE 25 MG: 25 TABLET, EXTENDED RELEASE ORAL at 21:24

## 2024-02-16 RX ADMIN — CEFTRIAXONE SODIUM 1 G: 1 INJECTION, SOLUTION INTRAVENOUS at 21:25

## 2024-02-16 RX ADMIN — HYDROCODONE BITARTRATE AND ACETAMINOPHEN 1 TABLET: 5; 325 TABLET ORAL at 19:45

## 2024-02-16 RX ADMIN — DEXTROSE MONOHYDRATE AND SODIUM CHLORIDE 125 ML/HR: 5; .45 INJECTION, SOLUTION INTRAVENOUS at 18:46

## 2024-02-16 RX ADMIN — SODIUM CHLORIDE 125 ML/HR: 9 INJECTION, SOLUTION INTRAVENOUS at 05:21

## 2024-02-16 RX ADMIN — ASPIRIN 81 MG: 81 TABLET, COATED ORAL at 21:25

## 2024-02-16 RX ADMIN — ATORVASTATIN CALCIUM 20 MG: 20 TABLET, FILM COATED ORAL at 21:25

## 2024-02-16 RX ADMIN — IOHEXOL 75 ML: 350 INJECTION, SOLUTION INTRAVENOUS at 09:39

## 2024-02-16 RX ADMIN — DOCUSATE SODIUM 100 MG: 100 CAPSULE, LIQUID FILLED ORAL at 21:25

## 2024-02-16 SDOH — SOCIAL STABILITY: SOCIAL INSECURITY: DO YOU FEEL UNSAFE GOING BACK TO THE PLACE WHERE YOU ARE LIVING?: NO

## 2024-02-16 SDOH — SOCIAL STABILITY: SOCIAL INSECURITY: HAVE YOU HAD THOUGHTS OF HARMING ANYONE ELSE?: NO

## 2024-02-16 SDOH — SOCIAL STABILITY: SOCIAL INSECURITY: DO YOU FEEL ANYONE HAS EXPLOITED OR TAKEN ADVANTAGE OF YOU FINANCIALLY OR OF YOUR PERSONAL PROPERTY?: NO

## 2024-02-16 SDOH — SOCIAL STABILITY: SOCIAL INSECURITY: WERE YOU ABLE TO COMPLETE ALL THE BEHAVIORAL HEALTH SCREENINGS?: YES

## 2024-02-16 SDOH — SOCIAL STABILITY: SOCIAL INSECURITY: ABUSE: ADULT

## 2024-02-16 SDOH — SOCIAL STABILITY: SOCIAL INSECURITY: DOES ANYONE TRY TO KEEP YOU FROM HAVING/CONTACTING OTHER FRIENDS OR DOING THINGS OUTSIDE YOUR HOME?: NO

## 2024-02-16 SDOH — SOCIAL STABILITY: SOCIAL INSECURITY: HAS ANYONE EVER THREATENED TO HURT YOUR FAMILY OR YOUR PETS?: NO

## 2024-02-16 SDOH — SOCIAL STABILITY: SOCIAL INSECURITY: ARE THERE ANY APPARENT SIGNS OF INJURIES/BEHAVIORS THAT COULD BE RELATED TO ABUSE/NEGLECT?: NO

## 2024-02-16 SDOH — SOCIAL STABILITY: SOCIAL INSECURITY: ARE YOU OR HAVE YOU BEEN THREATENED OR ABUSED PHYSICALLY, EMOTIONALLY, OR SEXUALLY BY ANYONE?: NO

## 2024-02-16 ASSESSMENT — ACTIVITIES OF DAILY LIVING (ADL)
BATHING: NEEDS ASSISTANCE
ASSISTIVE_DEVICE: WALKER
DRESSING YOURSELF: NEEDS ASSISTANCE
TOILETING: NEEDS ASSISTANCE
GROOMING: NEEDS ASSISTANCE
JUDGMENT_ADEQUATE_SAFELY_COMPLETE_DAILY_ACTIVITIES: YES
LACK_OF_TRANSPORTATION: NO
HEARING - LEFT EAR: FUNCTIONAL
ADEQUATE_TO_COMPLETE_ADL: YES
HEARING - RIGHT EAR: FUNCTIONAL
FEEDING YOURSELF: INDEPENDENT
WALKS IN HOME: NEEDS ASSISTANCE
PATIENT'S MEMORY ADEQUATE TO SAFELY COMPLETE DAILY ACTIVITIES?: YES

## 2024-02-16 ASSESSMENT — COGNITIVE AND FUNCTIONAL STATUS - GENERAL
STANDING UP FROM CHAIR USING ARMS: A LITTLE
DAILY ACTIVITIY SCORE: 21
CLIMB 3 TO 5 STEPS WITH RAILING: A LOT
CLIMB 3 TO 5 STEPS WITH RAILING: A LOT
PATIENT BASELINE BEDBOUND: NO
MOBILITY SCORE: 19
WALKING IN HOSPITAL ROOM: A LITTLE
MOBILITY SCORE: 19
DRESSING REGULAR LOWER BODY CLOTHING: A LITTLE
TOILETING: A LITTLE
HELP NEEDED FOR BATHING: A LITTLE
MOVING TO AND FROM BED TO CHAIR: A LITTLE
DRESSING REGULAR LOWER BODY CLOTHING: A LITTLE
MOVING TO AND FROM BED TO CHAIR: A LITTLE
TOILETING: A LITTLE
HELP NEEDED FOR BATHING: A LITTLE
TOILETING: A LITTLE
MOBILITY SCORE: 19
WALKING IN HOSPITAL ROOM: A LITTLE
CLIMB 3 TO 5 STEPS WITH RAILING: A LOT
DRESSING REGULAR LOWER BODY CLOTHING: A LITTLE
HELP NEEDED FOR BATHING: A LITTLE
WALKING IN HOSPITAL ROOM: A LITTLE
DAILY ACTIVITIY SCORE: 21
DAILY ACTIVITIY SCORE: 21
MOVING TO AND FROM BED TO CHAIR: A LITTLE

## 2024-02-16 ASSESSMENT — ENCOUNTER SYMPTOMS
NEUROLOGICAL NEGATIVE: 1
CONFUSION: 1
CONSTIPATION: 1
CARDIOVASCULAR NEGATIVE: 1
ENDOCRINE NEGATIVE: 1
CHILLS: 1
RESPIRATORY NEGATIVE: 1
EYES NEGATIVE: 1

## 2024-02-16 ASSESSMENT — PAIN - FUNCTIONAL ASSESSMENT
PAIN_FUNCTIONAL_ASSESSMENT: 0-10

## 2024-02-16 ASSESSMENT — LIFESTYLE VARIABLES
HOW OFTEN DO YOU HAVE A DRINK CONTAINING ALCOHOL: NEVER
AUDIT-C TOTAL SCORE: 0
PRESCIPTION_ABUSE_PAST_12_MONTHS: NO
HOW OFTEN DO YOU HAVE 6 OR MORE DRINKS ON ONE OCCASION: NEVER
SKIP TO QUESTIONS 9-10: 1
HOW MANY STANDARD DRINKS CONTAINING ALCOHOL DO YOU HAVE ON A TYPICAL DAY: PATIENT DOES NOT DRINK
AUDIT-C TOTAL SCORE: 0
SUBSTANCE_ABUSE_PAST_12_MONTHS: NO

## 2024-02-16 ASSESSMENT — PAIN SCALES - GENERAL
PAINLEVEL_OUTOF10: 7
PAINLEVEL_OUTOF10: 7
PAINLEVEL_OUTOF10: 3

## 2024-02-16 ASSESSMENT — PATIENT HEALTH QUESTIONNAIRE - PHQ9
2. FEELING DOWN, DEPRESSED OR HOPELESS: NOT AT ALL
1. LITTLE INTEREST OR PLEASURE IN DOING THINGS: NOT AT ALL
SUM OF ALL RESPONSES TO PHQ9 QUESTIONS 1 & 2: 0

## 2024-02-16 ASSESSMENT — PAIN DESCRIPTION - LOCATION: LOCATION: KNEE

## 2024-02-16 ASSESSMENT — COLUMBIA-SUICIDE SEVERITY RATING SCALE - C-SSRS
2. HAVE YOU ACTUALLY HAD ANY THOUGHTS OF KILLING YOURSELF?: NO
1. IN THE PAST MONTH, HAVE YOU WISHED YOU WERE DEAD OR WISHED YOU COULD GO TO SLEEP AND NOT WAKE UP?: NO
6. HAVE YOU EVER DONE ANYTHING, STARTED TO DO ANYTHING, OR PREPARED TO DO ANYTHING TO END YOUR LIFE?: NO
2. HAVE YOU ACTUALLY HAD ANY THOUGHTS OF KILLING YOURSELF?: NO
1. IN THE PAST MONTH, HAVE YOU WISHED YOU WERE DEAD OR WISHED YOU COULD GO TO SLEEP AND NOT WAKE UP?: NO
6. HAVE YOU EVER DONE ANYTHING, STARTED TO DO ANYTHING, OR PREPARED TO DO ANYTHING TO END YOUR LIFE?: NO

## 2024-02-16 NOTE — ED PROVIDER NOTES
HPI   Chief Complaint   Patient presents with    Knee Pain     Red, warm to touch, swollen, left total knee about 4 days ago.        HPI  Patient stated that he has a left knee replacement 2 days ago February more than few days and brought him to the ER with a complaint left knee pain but he was pointing to right leg when asked about pain and then he corrected himself and stated that he has been a left leg pain and swelling.  He has left knee replacement he thought it was N[place] and possibly a GI bug.  He denies any chest pain or short of breath abdominal pain vomiting or diarrhea.  He admitted history of coronary artery disease history of MI in the past.  He thinks he may be taking blood thinner but does not remember the name.  He denies any trauma fall or injury after the knee replacement.  He has prior history of right knee replacement but no new surgery in the right knee patient's status post left knee replacement.    Patient record shows that he has a left knee replacement done by Dr. Bertin aiken at [facility] on February 12, 2024.        Family history: Unobtainable  Social history: Reviewed patient has questionable denies substance abuse does not smoke.  Review of system: 10 review of system obtained review of system as described in HPI was negative however patient a appeared to be confused about recent events               No data recorded                   Patient History   Past Medical History:   Diagnosis Date    BPH (benign prostatic hyperplasia)     CAD (coronary artery disease)     H/O left knee surgery     x 2 as a teen    Heart failure (CMS/HCC)     HLD (hyperlipidemia)     Hypertension     Old myocardial infarction 10/12/2021    History of acute myocardial infarction    Personal history of malignant neoplasm, unspecified     History of malignant neoplasm    Prostate cancer (CMS/HCC)     Skin cancer      Past Surgical History:   Procedure Laterality Date    CARDIAC CATHETERIZATION   2016    OTHER SURGICAL HISTORY  02/15/2022    Knee surgery    OTHER SURGICAL HISTORY  02/15/2022    Hernia repair     Family History   Problem Relation Name Age of Onset    Other (CABG) Mother      Heart attack Father      Heart attack Brother      Other (CABG) Brother      Heart attack Brother      Other (CABG) Brother       Social History     Tobacco Use    Smoking status: Former     Years: 7     Types: Cigarettes     Quit date: 1970     Years since quittin.1     Passive exposure: Never    Smokeless tobacco: Never   Vaping Use    Vaping Use: Never used   Substance Use Topics    Alcohol use: Not Currently     Comment: OCCASIONAL    Drug use: Never       Physical Exam   ED Triage Vitals [24 0501]   Temperature Heart Rate Respirations BP   36.7 °C (98 °F) 93 18 112/58      Pulse Ox Temp Source Heart Rate Source Patient Position   95 % Tympanic -- Lying      BP Location FiO2 (%)     Right arm --       Physical Exam  Constitutional:       Appearance: Normal appearance.      Comments: Patient is awake alert oriented x 2 confused about review of recent event but no facial drooping or drooling normal speech follow commands able to move arms and legs left leg recent surgery with edema of the thigh and leg noted there was no warmness or seepage.  He could recall his past medical history more accurately and confused about recent surgery.  No tachypnea hypoxemia respite distress noted   HENT:      Head: Normocephalic and atraumatic.      Right Ear: External ear normal.      Left Ear: External ear normal.      Nose: Nose normal. No congestion or rhinorrhea.   Eyes:      Extraocular Movements: Extraocular movements intact.      Conjunctiva/sclera: Conjunctivae normal.      Pupils: Pupils are equal, round, and reactive to light.   Cardiovascular:      Rate and Rhythm: Normal rate and regular rhythm.      Pulses: Normal pulses.      Heart sounds: Normal heart sounds.      Comments: Heart regular rate and rhythm no  friction rub no murmur.  Left leg edema as described MSK examination calf is nontender intact distal pulse intact sensation no cyanosis.  Good skin perfusion  Pulmonary:      Effort: Pulmonary effort is normal.      Breath sounds: Normal breath sounds.      Comments: Patient noted crackles in lung bases moving air well bilaterally no tachypnea hypoxemia respite distress.  Good skin perfusion no cyanosis no hypoxemia.  Left leg edema noted as In MSK examination with recent surgery.  Abdominal:      General: Abdomen is flat. Bowel sounds are normal.      Palpations: Abdomen is soft.   Genitourinary:     Comments: No  complaints examination deferred  Musculoskeletal:      Cervical back: Normal range of motion and neck supple.      Comments: Patient was noted edema of the left leg thigh and knee and below the knee however there was no warmness the dressing appear intact there is no seepage or drainage.  Calf is soft nontender intact distal history of procedure eval cirrhosis.  Stiffness of the knee due to recent surgery he was able to move his legs.  Old surgical scar noted in the right knee.  Pelvic is stable.  He was able to move his arms and legs except stiffness of left leg.    Intact dorsalis pedal posterior vessels of both upper extremity good motion nontender spine nontender neck is supple   Skin:     General: Skin is warm.      Capillary Refill: Capillary refill takes less than 2 seconds.   Neurological:      General: No focal deficit present.      Mental Status: He is alert and oriented to person, place, and time.      Comments: Patient is confused about recent event but otherwise is he was awake alert oriented x 2 could not recall his past medical history clear speech able to move some select medical supplements of adjustments.  Postsurgical changes left leg with edema of the left thigh and leg noted.  Calf is nontender.  Intact sensation.  No nuchal rigidity   Psychiatric:         Mood and Affect: Mood  normal.         Behavior: Behavior normal.         ED Course & MDM   ED Course as of 03/13/24 1241   Fri Feb 16, 2024   0803 CBC and Auto Differential(!)  CBC reviewed.  White count normal at 10.4 hemoglobin 10.0 hematocrit 30.6, platelet count 214,000, no significant shift [EC]   0804 Urinalysis with Reflex Microscopic(!)  Urinalysis is negative for infection. [EC]   0804 Lactate  Lactate is normal at 1.3 [EC]   0804 D-Dimer, Quantitative Non VTE(!)  D-dimer quant VTE is elevated 2445 [EC]   0804 Patient is having a venous duplex scan of the left lower extremity to rule out DVT. [EC]   0805 Comprehensive metabolic panel is pending.  I am ordering a CT PE study. [EC]   1247 CT of the chest is negative for PE.  No consolidative process. [EC]   1248 Ultrasound of the left lower extremity  IMPRESSION:  No sonographic evidence for deep vein thrombosis within the evaluated  veins.   [EC]   1249 CT of the head is unremarkable.  IMPRESSION:  No evidence of acute intracranial hemorrhage or acute cortical  infarct. Mild-to-moderate chronic microvascular ischemic change. If  there is concern for acute ischemia consider follow-up brain MRI.   [EC]   1249 X-ray of the left knee  IMPRESSION:  Recent postsurgical change of left total knee arthroplasty with  decreased amount of knee joint effusion and decreased soft tissue gas.   [EC]   1250 Comprehensive metabolic panel(!) [EC]   1250 CBC and Auto Differential(!) [EC]      ED Course User Index  [EC] Bladimir Peace DO         Diagnoses as of 03/13/24 1241   Status post revision of total knee, left   Confusion   Acute leg pain, left   Acute renal failure, unspecified acute renal failure type (CMS/HCC)   EKG done at 525 hours in the morning showed normal sinus rhythm with sinus arrhythmia rate of 86.  LVH by voltage criteria.  Inferior infarct age undetermined.  No ST-T evaluation.  No STEMI.  Abnormal EKG DC at this EKG    Medical Decision Making    Patient is status post left  knee replacement day by Dr. Lindsey Joe at [facility] on 12 February.  He was brought into the emergency room today by the squad this early in the morning with a complaint of left leg pain.  There was no reported trauma fall injury after surgery.  He was somewhat confused about recent event but otherwise awake and alert.  There was no reported fever chills cough nausea vomiting diarrhea chest pain or short of breath loss of conscious blackout or pass out.    Patient was found to have  left leg edema with postsurgical changes calf is nontender intact just distal dorsalis pedal pulse radiographs.  Good capillary refill.  Stiffness of the left lower leg noted.  Rest of the MSK examination normal.  Neck is supple lungs with crackles in the lung bases on auscultation however moving air well no tachypnea hypoxemia respite distress.    I ordered EKG troponin BMP CT of the head due to confusion, labs including CBC chemistry electrolytes lactate level as well as D-dimer and left knee x-ray.  Ultrasound not available however final ultrasound become available this morning after 8 AM patient will benefit from ultrasound left lower extremity .  Patient to be reevaluated.  5:20 AM  Patient is without any chest pain however his troponin is mildly elevated 35 his D-dimer was 2445 ultrasound pending I have ordered ultrasound lower extremities.  I also put a request for transfer to [facility].  Patient need for transfer/ hospitalization and further care.    Dr. Peace, assumed patient care while patient was still in the emergency room  Procedures     Diane Estrada MD  02/16/24 0512       Diane Estrada MD  02/16/24 0521       Diaen Estrada MD  02/16/24 0714       Diane Estrada MD  03/13/24 1247

## 2024-02-16 NOTE — PROGRESS NOTES
Emergency Medicine Transition of Care Note.    I received Avery Huff in signout from Dr. TALI Estrada.  Please see the previous ED provider note for all HPI, PE and MDM up to the time of signout at 0700. This is in addition to the primary record.    In brief Avery Huff is an 79 y.o. male presenting for   Chief Complaint   Patient presents with    Knee Pain     Red, warm to touch, swollen, left total knee about 4 days ago.      At the time of signout we were awaiting: Venous duplex US of left leg    Patient is status post left knee replacement day by Dr. Lindsey Joe at [facility] on 12 February.  He was brought into the emergency room today by the squad this early in the morning with a complaint of left leg pain.  Patient stated difficulty walking on his leg.  There was no reported trauma fall injury after surgery.  He was somewhat confused about recent event but otherwise awake and alert.  There was no reported fever chills cough nausea vomiting diarrhea chest pain or short of breath loss of conscious blackout or pass out.     Patient was found to have  left leg edema with postsurgical changes calf is nontender intact just distal dorsalis pedal pulse radiographs.  Good capillary refill.  Stiffness of the left lower leg noted. Reduced ROM of left knee.  Rest of the MSK examination normal.  Neck is supple lungs with crackles in the lung bases on auscultation however moving air well no tachypnea hypoxemia respite distress.     I ordered EKG troponin BMP CT of the head due to confusion, labs including CBC chemistry electrolytes lactate level as well as D-dimer and left knee x-ray.  Ultrasound not available however final ultrasound become available this morning after 8 AM patient will benefit from ultrasound left lower extremity .  Patient to be reevaluated.  5:20 AM  Patient is without any chest pain however his troponin is mildly elevated 35 his D-dimer was 2445 ultrasound pending I have  ordered ultrasound lower extremities.     Results for orders placed or performed during the hospital encounter of 02/16/24   Urinalysis with Reflex Microscopic   Result Value Ref Range    Color, Urine Yellow Straw, Yellow    Appearance, Urine Hazy (N) Clear    Specific Gravity, Urine 1.015 1.005 - 1.035    pH, Urine 5.0 5.0, 5.5, 6.0, 6.5, 7.0, 7.5, 8.0    Protein, Urine NEGATIVE NEGATIVE mg/dL    Glucose, Urine NEGATIVE NEGATIVE mg/dL    Blood, Urine NEGATIVE NEGATIVE    Ketones, Urine NEGATIVE NEGATIVE mg/dL    Bilirubin, Urine NEGATIVE NEGATIVE    Urobilinogen, Urine <2.0 <2.0 mg/dL    Nitrite, Urine NEGATIVE NEGATIVE    Leukocyte Esterase, Urine NEGATIVE NEGATIVE   CBC and Auto Differential   Result Value Ref Range    WBC 10.4 4.4 - 11.3 x10*3/uL    nRBC 0.0 0.0 - 0.0 /100 WBCs    RBC 3.10 (L) 4.50 - 5.90 x10*6/uL    Hemoglobin 10.0 (L) 13.5 - 17.5 g/dL    Hematocrit 30.6 (L) 41.0 - 52.0 %    MCV 99 80 - 100 fL    MCH 32.3 26.0 - 34.0 pg    MCHC 32.7 32.0 - 36.0 g/dL    RDW 13.7 11.5 - 14.5 %    Platelets 214 150 - 450 x10*3/uL    Neutrophils % 71.8 40.0 - 80.0 %    Immature Granulocytes %, Automated 0.7 0.0 - 0.9 %    Lymphocytes % 10.5 13.0 - 44.0 %    Monocytes % 13.2 2.0 - 10.0 %    Eosinophils % 3.5 0.0 - 6.0 %    Basophils % 0.3 0.0 - 2.0 %    Neutrophils Absolute 7.49 (H) 1.60 - 5.50 x10*3/uL    Immature Granulocytes Absolute, Automated 0.07 0.00 - 0.50 x10*3/uL    Lymphocytes Absolute 1.09 0.80 - 3.00 x10*3/uL    Monocytes Absolute 1.38 (H) 0.05 - 0.80 x10*3/uL    Eosinophils Absolute 0.37 0.00 - 0.40 x10*3/uL    Basophils Absolute 0.03 0.00 - 0.10 x10*3/uL   Lactate   Result Value Ref Range    Lactate 1.3 0.4 - 2.0 mmol/L   Troponin I, High Sensitivity   Result Value Ref Range    Troponin I, High Sensitivity 35 (H) 0 - 20 ng/L   D-Dimer, Quantitative Non VTE   Result Value Ref Range    D-Dimer Non VTE, Quant (ng/mL FEU) 2,445 (H) <=500 ng/mL FEU   B-Type Natriuretic Peptide   Result Value Ref Range     BNP 29 0 - 99 pg/mL   Comprehensive metabolic panel   Result Value Ref Range    Glucose 102 (H) 74 - 99 mg/dL    Sodium 135 (L) 136 - 145 mmol/L    Potassium 4.3 3.5 - 5.3 mmol/L    Chloride 103 98 - 107 mmol/L    Bicarbonate 19 (L) 21 - 32 mmol/L    Anion Gap 17 10 - 20 mmol/L    Urea Nitrogen 73 (H) 6 - 23 mg/dL    Creatinine 4.08 (H) 0.50 - 1.30 mg/dL    eGFR 14 (L) >60 mL/min/1.73m*2    Calcium 8.7 8.6 - 10.3 mg/dL    Albumin 3.3 (L) 3.4 - 5.0 g/dL    Alkaline Phosphatase 75 33 - 136 U/L    Total Protein 6.3 (L) 6.4 - 8.2 g/dL    AST 23 9 - 39 U/L    Bilirubin, Total 1.0 0.0 - 1.2 mg/dL    ALT 10 10 - 52 U/L   Troponin I, High Sensitivity   Result Value Ref Range    Troponin I, High Sensitivity 33 (H) 0 - 20 ng/L   ECG 12 lead   Result Value Ref Range    Ventricular Rate 86 BPM    Atrial Rate 86 BPM    NV Interval 170 ms    QRS Duration 108 ms    QT Interval 370 ms    QTC Calculation(Bazett) 442 ms    P Axis 25 degrees    R Axis -28 degrees    T Axis 31 degrees    QRS Count 14 beats    Q Onset 211 ms    P Onset 126 ms    P Offset 178 ms    T Offset 396 ms    QTC Fredericia 417 ms    VISIT  ED Course as of 02/16/24 1402   Fri Feb 16, 2024   0803 CBC and Auto Differential(!)  CBC reviewed.  White count normal at 10.4 hemoglobin 10.0 hematocrit 30.6, platelet count 214,000, no significant shift [EC]   0804 Urinalysis with Reflex Microscopic(!)  Urinalysis is negative for infection. [EC]   0804 Lactate  Lactate is normal at 1.3 [EC]   0804 D-Dimer, Quantitative Non VTE(!)  D-dimer quant VTE is elevated 2445 [EC]   0804 Patient is having a venous duplex scan of the left lower extremity to rule out DVT. [EC]   0805 Comprehensive metabolic panel is pending.  I am ordering a CT PE study. [EC]   1247 CT of the chest is negative for PE.  No consolidative process. [EC]   1248 Ultrasound of the left lower extremity  IMPRESSION:  No sonographic evidence for deep vein thrombosis within the evaluated  veins.   [EC]   1248 CT  of the head is unremarkable.  IMPRESSION:  No evidence of acute intracranial hemorrhage or acute cortical  infarct. Mild-to-moderate chronic microvascular ischemic change. If  there is concern for acute ischemia consider follow-up brain MRI.   [EC]   1249 X-ray of the left knee  IMPRESSION:  Recent postsurgical change of left total knee arthroplasty with  decreased amount of knee joint effusion and decreased soft tissue gas.   [EC]   1250 Comprehensive metabolic panel(!) [EC]   1250 CBC and Auto Differential(!) [EC]      ED Course User Index  [EC] Bladimir Peace DO         Diagnoses as of 02/16/24 1402   Status post revision of total knee, left   Confusion   Acute leg pain, left   Acute renal failure, unspecified acute renal failure type (CMS/HCC)       Medical Decision Making  Ultrasound of the left leg is negative for DVT.  CT of the chest is negative for PE.  Blood work reviewed and patient has BUN of 73 and a creatinine of 4.08 consistent with acute kidney failure  I did discuss the case with the on-call hospitalist Dr. Castillo at [facility] and the patient was accepted in transfer there.  The patient is agreeable and wants to be transferred to [facility]        Final diagnoses:   [Z96.652] Status post revision of total knee, left   [R41.0] Confusion   [M79.605] Acute leg pain, left   [N17.9] Acute renal failure, unspecified acute renal failure type (CMS/HCC)           Procedure  Procedures    Bladimir Peace DO

## 2024-02-16 NOTE — CARE PLAN
Problem: Pain - Adult  Goal: Verbalizes/displays adequate comfort level or baseline comfort level  Outcome: Progressing     Problem: Safety - Adult  Goal: Free from fall injury  Outcome: Progressing     Problem: Discharge Planning  Goal: Discharge to home or other facility with appropriate resources  Outcome: Progressing     Problem: Chronic Conditions and Co-morbidities  Goal: Patient's chronic conditions and co-morbidity symptoms are monitored and maintained or improved  Outcome: Progressing   The patient's goals for the shift include      The clinical goals for the shift include monitor labwork

## 2024-02-16 NOTE — ED TRIAGE NOTES
Patient brought in by [facility] after calling for a lift assist. Patient had a left total knee replacement done at [facility] on 2-12-24. Today he states his left knee is swollen, warm to touch and red. States he is weak and can't put weight on that leg.

## 2024-02-17 ENCOUNTER — APPOINTMENT (OUTPATIENT)
Dept: RADIOLOGY | Facility: HOSPITAL | Age: 79
DRG: 683 | End: 2024-02-17
Payer: MEDICARE

## 2024-02-17 LAB
ALBUMIN SERPL-MCNC: 2.7 G/DL (ref 3.5–5)
ALP BLD-CCNC: 112 U/L (ref 35–125)
ALT SERPL-CCNC: 24 U/L (ref 5–40)
ANION GAP SERPL CALC-SCNC: 11 MMOL/L
AST SERPL-CCNC: 45 U/L (ref 5–40)
BILIRUB SERPL-MCNC: 0.8 MG/DL (ref 0.1–1.2)
BUN SERPL-MCNC: 55 MG/DL (ref 8–25)
CALCIUM SERPL-MCNC: 8.4 MG/DL (ref 8.5–10.4)
CHLORIDE SERPL-SCNC: 109 MMOL/L (ref 97–107)
CO2 SERPL-SCNC: 18 MMOL/L (ref 24–31)
CREAT SERPL-MCNC: 2.1 MG/DL (ref 0.4–1.6)
EGFRCR SERPLBLD CKD-EPI 2021: 31 ML/MIN/1.73M*2
ERYTHROCYTE [DISTWIDTH] IN BLOOD BY AUTOMATED COUNT: 13.3 % (ref 11.5–14.5)
GLUCOSE SERPL-MCNC: 144 MG/DL (ref 65–99)
HCT VFR BLD AUTO: 29.2 % (ref 41–52)
HGB BLD-MCNC: 10.3 G/DL (ref 13.5–17.5)
MCH RBC QN AUTO: 32.6 PG (ref 26–34)
MCHC RBC AUTO-ENTMCNC: 35.3 G/DL (ref 32–36)
MCV RBC AUTO: 92 FL (ref 80–100)
NRBC BLD-RTO: 0 /100 WBCS (ref 0–0)
PLATELET # BLD AUTO: 206 X10*3/UL (ref 150–450)
POTASSIUM SERPL-SCNC: 3.9 MMOL/L (ref 3.4–5.1)
PROT SERPL-MCNC: 5.8 G/DL (ref 5.9–7.9)
RBC # BLD AUTO: 3.16 X10*6/UL (ref 4.5–5.9)
SODIUM SERPL-SCNC: 138 MMOL/L (ref 133–145)
WBC # BLD AUTO: 6.7 X10*3/UL (ref 4.4–11.3)

## 2024-02-17 PROCEDURE — 36415 COLL VENOUS BLD VENIPUNCTURE: CPT | Performed by: NURSE PRACTITIONER

## 2024-02-17 PROCEDURE — 97162 PT EVAL MOD COMPLEX 30 MIN: CPT | Mod: GP

## 2024-02-17 PROCEDURE — 76770 US EXAM ABDO BACK WALL COMP: CPT

## 2024-02-17 PROCEDURE — 1090000002 HH PPS REVENUE DEBIT

## 2024-02-17 PROCEDURE — 80053 COMPREHEN METABOLIC PANEL: CPT | Performed by: NURSE PRACTITIONER

## 2024-02-17 PROCEDURE — 2500000001 HC RX 250 WO HCPCS SELF ADMINISTERED DRUGS (ALT 637 FOR MEDICARE OP): Performed by: NURSE PRACTITIONER

## 2024-02-17 PROCEDURE — 1090000001 HH PPS REVENUE CREDIT

## 2024-02-17 PROCEDURE — 1100000001 HC PRIVATE ROOM DAILY

## 2024-02-17 PROCEDURE — 2500000004 HC RX 250 GENERAL PHARMACY W/ HCPCS (ALT 636 FOR OP/ED): Performed by: INTERNAL MEDICINE

## 2024-02-17 PROCEDURE — 2500000005 HC RX 250 GENERAL PHARMACY W/O HCPCS: Performed by: NURSE PRACTITIONER

## 2024-02-17 PROCEDURE — 97165 OT EVAL LOW COMPLEX 30 MIN: CPT | Mod: GO

## 2024-02-17 PROCEDURE — 85027 COMPLETE CBC AUTOMATED: CPT | Performed by: NURSE PRACTITIONER

## 2024-02-17 PROCEDURE — 2500000004 HC RX 250 GENERAL PHARMACY W/ HCPCS (ALT 636 FOR OP/ED): Performed by: NURSE PRACTITIONER

## 2024-02-17 RX ORDER — DEXTROSE MONOHYDRATE AND SODIUM CHLORIDE 5; .9 G/100ML; G/100ML
100 INJECTION, SOLUTION INTRAVENOUS CONTINUOUS
Status: DISCONTINUED | OUTPATIENT
Start: 2024-02-17 | End: 2024-02-18

## 2024-02-17 RX ORDER — AMLODIPINE BESYLATE 2.5 MG/1
2.5 TABLET ORAL DAILY
Status: DISCONTINUED | OUTPATIENT
Start: 2024-02-18 | End: 2024-02-18

## 2024-02-17 RX ADMIN — DOCUSATE SODIUM 100 MG: 100 CAPSULE, LIQUID FILLED ORAL at 21:06

## 2024-02-17 RX ADMIN — ASPIRIN 81 MG: 81 TABLET, COATED ORAL at 09:20

## 2024-02-17 RX ADMIN — DOCUSATE SODIUM 100 MG: 100 CAPSULE, LIQUID FILLED ORAL at 09:29

## 2024-02-17 RX ADMIN — METOPROLOL SUCCINATE 25 MG: 25 TABLET, EXTENDED RELEASE ORAL at 21:06

## 2024-02-17 RX ADMIN — DEXTROSE MONOHYDRATE AND SODIUM CHLORIDE 125 ML/HR: 5; .45 INJECTION, SOLUTION INTRAVENOUS at 04:00

## 2024-02-17 RX ADMIN — ATORVASTATIN CALCIUM 20 MG: 20 TABLET, FILM COATED ORAL at 21:06

## 2024-02-17 RX ADMIN — POLYETHYLENE GLYCOL 3350 17 G: 17 POWDER, FOR SOLUTION ORAL at 09:21

## 2024-02-17 RX ADMIN — ASPIRIN 81 MG: 81 TABLET, COATED ORAL at 21:06

## 2024-02-17 RX ADMIN — DEXTROSE MONOHYDRATE AND SODIUM CHLORIDE 125 ML/HR: 5; .45 INJECTION, SOLUTION INTRAVENOUS at 14:26

## 2024-02-17 RX ADMIN — ONDANSETRON 4 MG: 4 TABLET, ORALLY DISINTEGRATING ORAL at 14:27

## 2024-02-17 RX ADMIN — DEXTROSE MONOHYDRATE AND SODIUM CHLORIDE 100 ML/HR: 5; .9 INJECTION, SOLUTION INTRAVENOUS at 17:01

## 2024-02-17 RX ADMIN — ONDANSETRON 4 MG: 4 TABLET, ORALLY DISINTEGRATING ORAL at 21:06

## 2024-02-17 RX ADMIN — CEFTRIAXONE SODIUM 1 G: 1 INJECTION, SOLUTION INTRAVENOUS at 21:06

## 2024-02-17 RX ADMIN — HYDROCODONE BITARTRATE AND ACETAMINOPHEN 1 TABLET: 5; 325 TABLET ORAL at 14:27

## 2024-02-17 RX ADMIN — HYDROCODONE BITARTRATE AND ACETAMINOPHEN 1 TABLET: 5; 325 TABLET ORAL at 21:06

## 2024-02-17 RX ADMIN — HYDROCODONE BITARTRATE AND ACETAMINOPHEN 1 TABLET: 5; 325 TABLET ORAL at 01:57

## 2024-02-17 RX ADMIN — HYDROCODONE BITARTRATE AND ACETAMINOPHEN 1 TABLET: 5; 325 TABLET ORAL at 08:33

## 2024-02-17 ASSESSMENT — COGNITIVE AND FUNCTIONAL STATUS - GENERAL
EATING MEALS: A LITTLE
MOVING TO AND FROM BED TO CHAIR: A LITTLE
TURNING FROM BACK TO SIDE WHILE IN FLAT BAD: A LITTLE
CLIMB 3 TO 5 STEPS WITH RAILING: A LOT
DRESSING REGULAR UPPER BODY CLOTHING: A LITTLE
DRESSING REGULAR LOWER BODY CLOTHING: A LITTLE
MOBILITY SCORE: 18
PERSONAL GROOMING: A LITTLE
DAILY ACTIVITIY SCORE: 21
WALKING IN HOSPITAL ROOM: A LITTLE
MOVING FROM LYING ON BACK TO SITTING ON SIDE OF FLAT BED WITH BEDRAILS: A LITTLE
DAILY ACTIVITIY SCORE: 18
WALKING IN HOSPITAL ROOM: A LITTLE
DRESSING REGULAR LOWER BODY CLOTHING: A LITTLE
TOILETING: A LITTLE
HELP NEEDED FOR BATHING: A LITTLE
CLIMB 3 TO 5 STEPS WITH RAILING: A LITTLE
HELP NEEDED FOR BATHING: A LITTLE
TURNING FROM BACK TO SIDE WHILE IN FLAT BAD: A LITTLE
TOILETING: A LITTLE
MOBILITY SCORE: 18
STANDING UP FROM CHAIR USING ARMS: A LITTLE
STANDING UP FROM CHAIR USING ARMS: A LITTLE
MOVING TO AND FROM BED TO CHAIR: A LITTLE

## 2024-02-17 ASSESSMENT — PAIN SCALES - GENERAL
PAINLEVEL_OUTOF10: 7
PAINLEVEL_OUTOF10: 6
PAINLEVEL_OUTOF10: 2
PAINLEVEL_OUTOF10: 8
PAINLEVEL_OUTOF10: 5 - MODERATE PAIN
PAINLEVEL_OUTOF10: 2
PAINLEVEL_OUTOF10: 6
PAINLEVEL_OUTOF10: 9
PAINLEVEL_OUTOF10: 7

## 2024-02-17 ASSESSMENT — PAIN DESCRIPTION - LOCATION
LOCATION: KNEE
LOCATION: KNEE

## 2024-02-17 ASSESSMENT — PAIN DESCRIPTION - ORIENTATION: ORIENTATION: LEFT

## 2024-02-17 ASSESSMENT — PAIN - FUNCTIONAL ASSESSMENT
PAIN_FUNCTIONAL_ASSESSMENT: 0-10

## 2024-02-17 ASSESSMENT — ACTIVITIES OF DAILY LIVING (ADL)
ADL_ASSISTANCE: INDEPENDENT
ADL_ASSISTANCE: INDEPENDENT
BATHING_ASSISTANCE: MINIMAL

## 2024-02-17 NOTE — CONSULTS
Reason For Consult  DEBBIE, Bladder outlet obstruction    History Of Present Illness  Avery Huff is a 79 y.o. male presenting with DEBBIE. He had a left TKR 2 days prior to admission and was discharged home.  He then became confused and his knee became more swollen  He went to the ED at [place] and it was thought that he may have a post-op infection.  He had a normal WBC but a slight left shift.  Renal function was found to be abnormal with a creatinine of 4.08 and he was in retention.  He was straight cathed for 1.2 liters and the UA was unremarkable.  He was transferred to [facility] for further evaluation.  He has felt much better and a knee infection has been ruled out.  He did void here and was then cathed for 300 ml of clear urine.  Creatinine came down to 2.10 and a renal U/S was normal.  He feels much better.  Urine is clear.  He has a history of undergoing brachytherapy for prostate cancer and his last PSA was 1.3.  Prior to surgery he claims no significant voiding issues, though he had been taking Vesicare.   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    Surgical History  He has a past surgical history that includes Other surgical history (02/15/2022); Other surgical history (02/15/2022); and Cardiac catheterization (2016).     Social History  He reports that he quit smoking about 54 years ago. His smoking use included cigarettes. He has never been exposed to tobacco smoke. He has never used smokeless tobacco. He reports that he does not currently use alcohol. He reports that he does not use drugs.    Family History  Family  "History   Problem Relation Name Age of Onset    Other (CABG) Mother      Heart attack Father      Heart attack Brother      Other (CABG) Brother      Heart attack Brother      Other (CABG) Brother          Allergies  Eszopiclone, Mirabegron, and Oxybutynin    Review of Systems  As per admission HPS     Physical Exam  Awake, alert and oriented  HEENT: normal EOM  Neck: Supple  Lungs:  Normal respiratory pattern  Abd:  Soft, non-tender  :  Robledo catheter in plce     Last Recorded Vitals  Blood pressure 134/64, pulse 83, temperature 37.7 °C (99.9 °F), temperature source Tympanic, resp. rate 20, height 1.778 m (5' 10\"), weight 86.2 kg (190 lb 0.6 oz), SpO2 96 %.    Relevant Results      Scheduled medications  [START ON 2/18/2024] amLODIPine, 2.5 mg, oral, Daily  aspirin, 81 mg, oral, BID  atorvastatin, 20 mg, oral, Nightly  cefTRIAXone, 1 g, intravenous, q24h  docusate sodium, 100 mg, oral, BID  metoprolol succinate XL, 25 mg, oral, Daily  polyethylene glycol, 17 g, oral, Daily      Continuous medications  D5 % and 0.9 % sodium chloride, 100 mL/hr, Last Rate: 100 mL/hr (02/17/24 1752)      PRN medications  PRN medications: acetaminophen, HYDROcodone-acetaminophen, ondansetron ODT **OR** [DISCONTINUED] ondansetron, sennosides  Results for orders placed or performed during the hospital encounter of 02/16/24 (from the past 24 hour(s))   CBC   Result Value Ref Range    WBC 6.7 4.4 - 11.3 x10*3/uL    nRBC 0.0 0.0 - 0.0 /100 WBCs    RBC 3.16 (L) 4.50 - 5.90 x10*6/uL    Hemoglobin 10.3 (L) 13.5 - 17.5 g/dL    Hematocrit 29.2 (L) 41.0 - 52.0 %    MCV 92 80 - 100 fL    MCH 32.6 26.0 - 34.0 pg    MCHC 35.3 32.0 - 36.0 g/dL    RDW 13.3 11.5 - 14.5 %    Platelets 206 150 - 450 x10*3/uL   Comprehensive metabolic panel   Result Value Ref Range    Glucose 144 (H) 65 - 99 mg/dL    Sodium 138 133 - 145 mmol/L    Potassium 3.9 3.4 - 5.1 mmol/L    Chloride 109 (H) 97 - 107 mmol/L    Bicarbonate 18 (L) 24 - 31 mmol/L    Urea Nitrogen 55 " (H) 8 - 25 mg/dL    Creatinine 2.10 (H) 0.40 - 1.60 mg/dL    eGFR 31 (L) >60 mL/min/1.73m*2    Calcium 8.4 (L) 8.5 - 10.4 mg/dL    Albumin 2.7 (L) 3.5 - 5.0 g/dL    Alkaline Phosphatase 112 35 - 125 U/L    Total Protein 5.8 (L) 5.9 - 7.9 g/dL    AST 45 (H) 5 - 40 U/L    Bilirubin, Total 0.8 0.1 - 1.2 mg/dL    ALT 24 5 - 40 U/L    Anion Gap 11 <=19 mmol/L        Assessment/Plan     Urinary Retention:  He appears to have been voiding well prior to surgery so the retention might be due to the surgery and the anesthetic effect.  Would keep the Robledo in until tomorrow and then if stable remove it for a voiding trial.  Vesicare should be temporarily stopped.    DEBBIE:  Possibly from obstruction as he had 1.2 liters when cathed in the ER at [place].  There are other possibilites, as well as noted by the nephrology consult.  There is no hydro but the U/S was done after the catheter was placed.  Would monitor at the point and see if it comes back to baseline.    Alan Moeller MD

## 2024-02-17 NOTE — CARE PLAN
The patient's goals for the shift include  manage pain, work with therapy, rest.    The clinical goals for the shift include manage pain, monitor labs and VS, no falls (safety), work with therapy, promote rest.    No barriers to meeting these goals.       Problem: Pain - Adult  Goal: Verbalizes/displays adequate comfort level or baseline comfort level  Outcome: Progressing     Problem: Safety - Adult  Goal: Free from fall injury  Outcome: Progressing     Problem: Discharge Planning  Goal: Discharge to home or other facility with appropriate resources  Outcome: Progressing     Problem: Chronic Conditions and Co-morbidities  Goal: Patient's chronic conditions and co-morbidity symptoms are monitored and maintained or improved  Outcome: Progressing     Problem: Pain  Goal: Takes deep breaths with improved pain control throughout the shift  Outcome: Progressing  Goal: Turns in bed with improved pain control throughout the shift  Outcome: Progressing  Goal: Walks with improved pain control throughout the shift  Outcome: Progressing  Goal: Performs ADL's with improved pain control throughout shift  Outcome: Progressing  Goal: Participates in PT with improved pain control throughout the shift  Outcome: Progressing  Goal: Free from opioid side effects throughout the shift  Outcome: Progressing  Goal: Free from acute confusion related to pain meds throughout the shift  Outcome: Progressing     Problem: Fall/Injury  Goal: Not fall by end of shift  Outcome: Progressing  Goal: Be free from injury by end of the shift  Outcome: Progressing  Goal: Verbalize understanding of personal risk factors for fall in the hospital  Outcome: Progressing  Goal: Verbalize understanding of risk factor reduction measures to prevent injury from fall in the home  Outcome: Progressing  Goal: Use assistive devices by end of the shift  Outcome: Progressing  Goal: Pace activities to prevent fatigue by end of the shift  Outcome: Progressing

## 2024-02-17 NOTE — PROGRESS NOTES
"Avery Huff is a 79 y.o. male on day 1 of admission presenting with Acute renal failure (ARF) (CMS/[facility]).    Subjective   Seen and examined creatinine is improving appetite is improving urine output is improving with IV fluids.  Awaiting input from orthopedic surgery nephrology and have requested urology consultation patient has a known history of BPH and attempted Flomax in the past with urinary incontinence I am wondering if he would be better served being on Proscar.  His ACE inhibitor remains discontinued as blood pressure is stable at 131/69 we we will likely need to initiate amlodipine if needed for blood pressure control       Objective     Physical Exam  Vitals and nursing note reviewed.   Constitutional:       Appearance: Normal appearance.   HENT:      Head: Normocephalic and atraumatic.      Mouth/Throat:      Mouth: Mucous membranes are moist.   Eyes:      Extraocular Movements: Extraocular movements intact.      Pupils: Pupils are equal, round, and reactive to light.   Cardiovascular:      Rate and Rhythm: Normal rate and regular rhythm.      Pulses: Normal pulses.      Heart sounds: Normal heart sounds.   Pulmonary:      Effort: Pulmonary effort is normal.      Breath sounds: Normal breath sounds.   Abdominal:      Palpations: Abdomen is soft.   Musculoskeletal:         General: Normal range of motion.      Cervical back: Normal range of motion and neck supple.   Skin:     General: Skin is warm and dry.      Capillary Refill: Capillary refill takes less than 2 seconds.   Neurological:      General: No focal deficit present.      Mental Status: He is alert and oriented to person, place, and time. Mental status is at baseline.   Psychiatric:         Mood and Affect: Mood normal.         Last Recorded Vitals  Blood pressure 131/69, pulse 91, temperature 36.8 °C (98.2 °F), temperature source Temporal, resp. rate 18, height 1.778 m (5' 10\"), weight 86.2 kg (190 lb 0.6 oz), SpO2 92 %.  Intake/Output last 3 " Shifts:  I/O last 3 completed shifts:  In: 950 (11 mL/kg) [I.V.:900 (10.4 mL/kg); IV Piggyback:50]  Out: 1600 (18.6 mL/kg) [Urine:1600 (0.5 mL/kg/hr)]  Weight: 86.2 kg     Relevant Results              Results for orders placed or performed during the hospital encounter of 02/16/24 (from the past 24 hour(s))   CBC   Result Value Ref Range    WBC 6.7 4.4 - 11.3 x10*3/uL    nRBC 0.0 0.0 - 0.0 /100 WBCs    RBC 3.16 (L) 4.50 - 5.90 x10*6/uL    Hemoglobin 10.3 (L) 13.5 - 17.5 g/dL    Hematocrit 29.2 (L) 41.0 - 52.0 %    MCV 92 80 - 100 fL    MCH 32.6 26.0 - 34.0 pg    MCHC 35.3 32.0 - 36.0 g/dL    RDW 13.3 11.5 - 14.5 %    Platelets 206 150 - 450 x10*3/uL   Comprehensive metabolic panel   Result Value Ref Range    Glucose 144 (H) 65 - 99 mg/dL    Sodium 138 133 - 145 mmol/L    Potassium 3.9 3.4 - 5.1 mmol/L    Chloride 109 (H) 97 - 107 mmol/L    Bicarbonate 18 (L) 24 - 31 mmol/L    Urea Nitrogen 55 (H) 8 - 25 mg/dL    Creatinine 2.10 (H) 0.40 - 1.60 mg/dL    eGFR 31 (L) >60 mL/min/1.73m*2    Calcium 8.4 (L) 8.5 - 10.4 mg/dL    Albumin 2.7 (L) 3.5 - 5.0 g/dL    Alkaline Phosphatase 112 35 - 125 U/L    Total Protein 5.8 (L) 5.9 - 7.9 g/dL    AST 45 (H) 5 - 40 U/L    Bilirubin, Total 0.8 0.1 - 1.2 mg/dL    ALT 24 5 - 40 U/L    Anion Gap 11 <=19 mmol/L     Scheduled medications  aspirin, 81 mg, oral, BID  atorvastatin, 20 mg, oral, Nightly  cefTRIAXone, 1 g, intravenous, q24h  docusate sodium, 100 mg, oral, BID  metoprolol succinate XL, 25 mg, oral, Daily  polyethylene glycol, 17 g, oral, Daily      Continuous medications  dextrose 5%-0.45 % sodium chloride, 125 mL/hr, Last Rate: 125 mL/hr (02/17/24 0407)      PRN medications  PRN medications: acetaminophen, HYDROcodone-acetaminophen, ondansetron ODT **OR** ondansetron, sennosides                   Assessment/Plan   Principal Problem:    Acute renal failure (ARF) (CMS/HCC)  Active Problems:    Benign essential hypertension    Coronary arteriosclerosis    Hyperlipidemia     Prostate cancer (CMS/[facility])    S/P total knee arthroplasty, left    Neurology consultation awaiting input from orthopedic surgery and nephrology anticipating discharge in 24 hours if creatinine continues to improve will likely maintain Robledo catheter till evaluated by urology and attempt trial of voiding discharge       I spent 40 minutes in the professional and overall care of this patient.      Dominguez Castillo DO

## 2024-02-17 NOTE — CONSULTS
Reason For Consult  Status post left total knee arthroplasty    History Of Present Illness  Avery Huff is a 79 y.o. male postop day 5 from left total knee arthroplasty.  Readmitted for acute kidney injury.  He denies having any increased pain in his knee.  No redness about the knee.  Dressing has been clean and dry.  He states his wife noticed he was slightly confused. Denies any complaints at this time     Past Medical History  He has a past medical history of BPH (benign prostatic hyperplasia), CAD (coronary artery disease), H/O left knee surgery, Heart failure (CMS/HCC), HLD (hyperlipidemia), Hypertension, Old myocardial infarction (10/12/2021), Personal history of malignant neoplasm, unspecified, Prostate cancer (CMS/HCC), and Skin cancer.    Surgical History  He has a past surgical history that includes Other surgical history (02/15/2022); Other surgical history (02/15/2022); and Cardiac catheterization (2016).     Social History  He reports that he quit smoking about 54 years ago. His smoking use included cigarettes. He has never been exposed to tobacco smoke. He has never used smokeless tobacco. He reports that he does not currently use alcohol. He reports that he does not use drugs.    Family History  Family History   Problem Relation Name Age of Onset    Other (CABG) Mother      Heart attack Father      Heart attack Brother      Other (CABG) Brother      Heart attack Brother      Other (CABG) Brother          Allergies  Eszopiclone, Mirabegron, and Oxybutynin    Review of Systems  Least 10 systems reviewed and are otherwise negative except as described in the HPI     Physical Exam  Lower extremity: Postop dressing clean dry and intact.  No erythema about the knee.  Normal postop swelling and warmth.  No calf tenderness.  Neurovascular intact distally.     Last Recorded Vitals  Blood pressure 131/69, pulse 91, temperature 36.8 °C (98.2 °F), temperature source Temporal, resp. rate 18, height 1.778 m (5'  "10\"), weight 86.2 kg (190 lb 0.6 oz), SpO2 92 %.    Relevant Results      Scheduled medications  [START ON 2/18/2024] amLODIPine, 2.5 mg, oral, Daily  aspirin, 81 mg, oral, BID  atorvastatin, 20 mg, oral, Nightly  cefTRIAXone, 1 g, intravenous, q24h  docusate sodium, 100 mg, oral, BID  metoprolol succinate XL, 25 mg, oral, Daily  polyethylene glycol, 17 g, oral, Daily      Continuous medications  dextrose 5%-0.45 % sodium chloride, 125 mL/hr, Last Rate: 125 mL/hr (02/17/24 0407)      PRN medications  PRN medications: acetaminophen, HYDROcodone-acetaminophen, ondansetron ODT **OR** [DISCONTINUED] ondansetron, sennosides  Results for orders placed or performed during the hospital encounter of 02/16/24 (from the past 24 hour(s))   CBC   Result Value Ref Range    WBC 6.7 4.4 - 11.3 x10*3/uL    nRBC 0.0 0.0 - 0.0 /100 WBCs    RBC 3.16 (L) 4.50 - 5.90 x10*6/uL    Hemoglobin 10.3 (L) 13.5 - 17.5 g/dL    Hematocrit 29.2 (L) 41.0 - 52.0 %    MCV 92 80 - 100 fL    MCH 32.6 26.0 - 34.0 pg    MCHC 35.3 32.0 - 36.0 g/dL    RDW 13.3 11.5 - 14.5 %    Platelets 206 150 - 450 x10*3/uL   Comprehensive metabolic panel   Result Value Ref Range    Glucose 144 (H) 65 - 99 mg/dL    Sodium 138 133 - 145 mmol/L    Potassium 3.9 3.4 - 5.1 mmol/L    Chloride 109 (H) 97 - 107 mmol/L    Bicarbonate 18 (L) 24 - 31 mmol/L    Urea Nitrogen 55 (H) 8 - 25 mg/dL    Creatinine 2.10 (H) 0.40 - 1.60 mg/dL    eGFR 31 (L) >60 mL/min/1.73m*2    Calcium 8.4 (L) 8.5 - 10.4 mg/dL    Albumin 2.7 (L) 3.5 - 5.0 g/dL    Alkaline Phosphatase 112 35 - 125 U/L    Total Protein 5.8 (L) 5.9 - 7.9 g/dL    AST 45 (H) 5 - 40 U/L    Bilirubin, Total 0.8 0.1 - 1.2 mg/dL    ALT 24 5 - 40 U/L    Anion Gap 11 <=19 mmol/L        Assessment/Plan     79-year-old male postop day 5 from left total knee arthroplasty.  Currently admitted for acute kidney injury.  Postop dressing clean dry and intact.  No increased pain in the knee over the past few days.  No localized erythema.  " Recommend physical therapy for mobilization, range of motion and strengthening.  DVT prophylaxis for 30 days.  Cruciate medicines help with the acute kidney injury.  Please follow-up in the office as scheduled.    Bertin Joe MD

## 2024-02-17 NOTE — PROGRESS NOTES
Occupational Therapy    Evaluation    Patient Name: Avery Huff  MRN: [id number]  Today's Date: 2/17/2024  Time Calculation  Start Time: 1045  Stop Time: 1102  Time Calculation (min): 17 min    Assessment  IP OT Assessment  OT Assessment: Patient is an 79 year old male admitted with recent L TKA and now presenting with acute renal failure. Patient is presenting with a decline in strength, balance, activity tolerance, and function, resulting in an increased need for assistance with ADL/IADL tasks. Skilled OT to address the above deficits in order to increase patient's safety and independence with daily tasks.  Prognosis: Good  Evaluation/Treatment Tolerance: Patient tolerated treatment well  End of Session Communication: Bedside nurse  End of Session Patient Position: Up in chair, Alarm on  Plan:  Treatment Interventions: ADL retraining, Functional transfer training, UE strengthening/ROM, Endurance training, Patient/family training, Compensatory technique education  OT Frequency: 2 times per week  OT Discharge Recommendations: Low intensity level of continued care  Equipment Recommended upon Discharge: Wheeled walker  OT Recommended Transfer Status: Assist of 1  OT - OK to Discharge: Yes    Subjective   Current Problem:  1. Acute renal failure (ARF) (CMS/HCC)          General:  General  Reason for Referral: decline in ADLs  Referred By: Nolvia Geiger  Past Medical History Relevant to Rehab: heart failure, renal stone, renal cyst, microhematuria, hematopermia  Family/Caregiver Present: Yes  Caregiver Feedback: family member present  Prior to Session Communication: Bedside nurse  Patient Position Received:  (ambulating in the martinez with PT)  Preferred Learning Style: verbal  General Comment: Patient is an 79 year old male who underwent L TKA on Monday 2/12/24. Patient presented to the ED with confusion and chills. Xray of the L knee completed indicating recent post surgical change of L TKA with decreased amount of  knee joint effusion and decreased soft tissue gas. Patient is admitted with acute renal failure. Patient is ambulating in the martinez with PT upon arrival and agreeable to OT  Precautions:  LE Weight Bearing Status: Weight Bearing as Tolerated  Medical Precautions: Fall precautions  Post-Surgical Precautions: Left total knee precautions  Pain:  Pain Assessment  Pain Assessment: 0-10  Pain Score: 6  Pain Type: Acute pain, Surgical pain  Pain Location: Knee  Pain Orientation: Left  Pain Interventions: Cold applied    Objective   Cognition:  Overall Cognitive Status: Within Functional Limits  Orientation Level: Oriented X4     Home Living:  Type of Home: House  Lives With: Spouse  Home Adaptive Equipment: Walker rolling or standard  Home Layout: Multi-level, Able to live on main level with bedroom/bathroom  Home Access: Stairs to enter with rails  Entrance Stairs-Rails: Right  Entrance Stairs-Number of Steps: 1  Bathroom Shower/Tub: Walk-in shower (with lip)  Bathroom Toilet: Handicapped height  Bathroom Equipment: Grab bars in shower, Built-in shower seat   Prior Function:  Level of [place]: Independent with ADLs and functional transfers, Independent with homemaking with ambulation  Receives Help From: Family  ADL Assistance: Independent  Homemaking Assistance: Independent  Ambulatory Assistance: Independent  Vocational: Retired  Leisure: golf  Prior Function Comments: patient is the primary caregiver for his spouse  IADL History:  Homemaking Responsibilities: Yes  IADL Comments: patient is independent with IADLs at baseline  ADL:  Eating Assistance: Stand by  Eating Deficit: Setup (seated, items within reach)  Grooming Assistance: Stand by  Grooming Deficit: Setup, Steadying, Supervision/safety, Increased time to complete, Standing with assistive device (per clinical judgement)  Bathing Assistance: Minimal  Bathing Deficit: Setup, Steadying, Supervision/safety, Increased time to complete , Buttocks (per clinical  judgement)  UE Dressing Assistance: Stand by  UE Dressing Deficit: Setup, Supervision/safety (per clinical judgement)  LE Dressing Assistance: Stand by  LE Dressing Deficit: Setup, Supervision/safety, Increased time to complete, Steadying (per clinical judgement)  Toileting Assistance with Device: Minimal  Toileting Deficit: Setup, Steadying, Supervison/safety, Increased time to complete (per clinical judgement)  Activity Tolerance:  Endurance: Decreased tolerance for upright activites  Bed Mobility/Transfers: Bed Mobility  Bed Mobility: No (patient out of bed and remains out of bed)    Transfers  Transfer: Yes  Transfer 1  Transfer From 1: Chair with arms to  Transfer to 1: Stand  Technique 1: Sit to stand  Transfer Device 1: Walker  Transfer Level of Assistance 1: Close supervision    IADL's:   Homemaking Responsibilities: Yes  IADL Comments: patient is independent with IADLs at baseline  Vision: Vision - Basic Assessment  Current Vision: No visual deficits  Sensation:  Sensation Comment: patient denies numbness/tingling  Strength:  Strength Comments: B UE at least >/= 3/5 grossly. observed functionally  Hand Function:  Hand Function  Gross Grasp: Functional  Coordination: Functional  Extremities: RUE   RUE : Within Functional Limits (observed functionally) and LUE   LUE: Within Functional Limits (observed functionally)    Outcome Measures: [facility] Daily Activity  Putting on and taking off regular lower body clothing: A little  Bathing (including washing, rinsing, drying): A little  Putting on and taking off regular upper body clothing: A little  Toileting, which includes using toilet, bedpan or urinal: A little  Taking care of personal grooming such as brushing teeth: A little  Eating Meals: A little  Daily Activity - Total Score: 18    Education Documentation  Body Mechanics, taught by Siomara Urrutia OT at 2/17/2024 11:37 AM.  Learner: Patient  Readiness: Acceptance  Method: Explanation, Demonstration  Response:  Needs Reinforcement    Precautions, taught by Siomara Urrutia OT at 2/17/2024 11:37 AM.  Learner: Patient  Readiness: Acceptance  Method: Explanation, Demonstration  Response: Needs Reinforcement    ADL Training, taught by Siomara Urrutia OT at 2/17/2024 11:37 AM.  Learner: Patient  Readiness: Acceptance  Method: Explanation, Demonstration  Response: Needs Reinforcement    Education Comments  No comments found.      Goals:   Encounter Problems       Encounter Problems (Active)       OT Goals       ADLs (Progressing)       Start:  02/17/24    Expected End:  02/29/24       Patient will complete ADL tasks with Mod I, using AE as needed, in order to increase safety and independence with self-care tasks.         Functional Transfers (Progressing)       Start:  02/17/24    Expected End:  02/29/24       Patient will complete functional transfers with Mod I in order to increase patient's safety and independence with daily tasks.         B UE Strengthening (Progressing)       Start:  02/17/24    Expected End:  02/29/24       Patient will increase B UE strength to 4+/5 for functional transfers.         Functional Mobility (Progressing)       Start:  02/17/24    Expected End:  02/29/24       Patient will demonstrate the ability to complete item retrieval and functional mobility with Mod I in order to increase safety and independence with self-care tasks.

## 2024-02-17 NOTE — PROGRESS NOTES
Physical Therapy    Physical Therapy Evaluation    Patient Name: Avery Huff  MRN: [id number]  Today's Date: 2/17/2024   Time Calculation  Start Time: 1029  Stop Time: 1049  Time Calculation (min): 20 min    Assessment/Plan   PT Assessment  PT Assessment Results: Decreased strength, Decreased range of motion, Decreased endurance, Impaired balance, Decreased mobility, Decreased coordination, Decreased cognition, Impaired judgement, Decreased safety awareness  Rehab Prognosis: Good  Evaluation/Treatment Tolerance: Patient tolerated treatment well  Medical Staff Made Aware: Yes  End of Session Communication: Bedside nurse  End of Session Patient Position: Up in chair, Alarm off, caregiver present (OT present)  IP OR SWING BED PT PLAN  Inpatient or Swing Bed: Inpatient  PT Plan  Treatment/Interventions: Bed mobility, Transfer training, Gait training, Balance training, Strengthening, Endurance training, Range of motion, Therapeutic exercise, Therapeutic activity, Home exercise program  PT Plan: Skilled PT  PT Frequency: 5 times per week  PT Discharge Recommendations: Low intensity level of continued care  Equipment Recommended upon Discharge: Wheeled walker  PT Recommended Transfer Status: Assist x1, Assistive device  PT - OK to Discharge: Yes      Subjective   General Visit Information:  General  Reason for Referral: Impaired Moblity  Referred By: Nolvia Geiger  Past Medical History Relevant to Rehab: heart failure, renal stone, renal cyst, microhematuria, hematopermia  Family/Caregiver Present: Yes  Caregiver Feedback: family member  Prior to Session Communication: Bedside nurse  Patient Position Received: Bed, 3 rail up, Alarm on  Preferred Learning Style: verbal  General Comment: 79 year old male admits after having recent L TKA ~1 week ago presents with Acute Renal Failure, HTN, CAD, HLD, Prostate CA, Constipation  Home Living:  Home Living  Type of Home: House  Lives With: Spouse  Home Adaptive Equipment:  Walker rolling or standard  Home Layout: Multi-level, Able to live on main level with bedroom/bathroom  Home Access: Stairs to enter with rails  Entrance Stairs-Rails: Right  Entrance Stairs-Number of Steps: 1  Bathroom Shower/Tub: Walk-in shower  Bathroom Toilet: Handicapped height  Bathroom Equipment: Grab bars in shower, Built-in shower seat  Prior Level of Function:  Prior Function Per Pt/Caregiver Report  Level of [place]: Independent with ADLs and functional transfers, Independent with homemaking with ambulation  Receives Help From: Family  ADL Assistance: Independent  Homemaking Assistance: Independent  Ambulatory Assistance: Independent  Prior Function Comments: Was supposed to start [facility] but with multiple hospital admissions post TKA has not begun working with [facility] PT yet. Denies falls at home post op  Precautions:  Precautions  LE Weight Bearing Status: Weight Bearing as Tolerated  Medical Precautions: Fall precautions  Post-Surgical Precautions: Left total knee precautions    Objective   Pain:  Pain Assessment  Pain Assessment: 0-10  Pain Score: 2  Pain Type: Surgical pain  Pain Location: Knee  Pain Orientation: Left  Pain Interventions: Ambulation/increased activity  Cognition:  Cognition  Overall Cognitive Status: Within Functional Limits    General Assessments:  Activity Tolerance  Endurance: Decreased tolerance for upright activites    Sensation  Light Touch: No apparent deficits  Functional Assessments:  Bed Mobility  Bed Mobility: Yes  Bed Mobility 1  Bed Mobility 1: Supine to sitting  Level of Assistance 1: Close supervision  Bed Mobility Comments 1: cues for technique    Transfers  Transfer: Yes  Transfer 1  Transfer From 1: Chair with arms to  Transfer to 1: Stand  Technique 1: Sit to stand  Transfer Device 1: Walker  Transfer Level of Assistance 1: Contact guard  Trials/Comments 1: cues for BUE pushoff    Ambulation/Gait Training  Ambulation/Gait Training Performed: Yes  Ambulation/Gait  Training 1  Surface 1: Level tile  Device 1: Rolling walker  Assistance 1: Contact guard  Quality of Gait 1:  (mild step through pattern with intermitent shuffle)  Comments/Distance (ft) 1: 75  Extremity/Trunk Assessments:  RLE   RLE : Within Functional Limits  LLE   LLE : Exceptions to WFL  Strength LLE  LLE Overall Strength: Deficits  L Hip Flexion: 2/5  L Knee Flexion: 2/5  L Knee Extension: 2/5  Outcome Measures:  [facility] Basic Mobility  Turning from your back to your side while in a flat bed without using bedrails: A little  Moving from lying on your back to sitting on the side of a flat bed without using bedrails: A little  Moving to and from bed to chair (including a wheelchair): A little  Standing up from a chair using your arms (e.g. wheelchair or bedside chair): A little  To walk in hospital room: A little  Climbing 3-5 steps with railing: A little  Basic Mobility - Total Score: 18    Encounter Problems       Encounter Problems (Active)       Balance       Standing Balance (Progressing)       Start:  02/17/24    Expected End:  03/02/24       Pt will demonstrate good static standing balance to promote safe participation with out of bed activity, transfers, and mobility              Mobility       Ambulation (Progressing)       Start:  02/17/24    Expected End:  03/02/24       Pt will ambulate 150' modified independent assist with walker to promote safe home mobility              Pain - Adult          Safety       Safe Mobility Techniques (Progressing)       Start:  02/17/24    Expected End:  03/02/24       Pt will correctly identify and demonstrate safe mobility techniques to reduce their risks for falls during their acute care stay            Goal 2 (Progressing)       Start:  02/17/24    Expected End:  03/02/24       Pt will correctly verbalize 100% of their post op precautions and correctly demonstrate these during functional movement without cuing needs from therapist              Transfers       Supine  to sit (Progressing)       Start:  02/17/24    Expected End:  03/02/24       Pt will transfer supine to sitting at edge of bed with modified independent assist to promote acute care out of bed activity           Sit to stand (Progressing)       Start:  02/17/24    Expected End:  03/02/24       Pt will transfer sit to standing position with modified independent assist and walker to promote safe out of bed activity                  Education Documentation  Mobility Training, taught by Remy Wilks, PT at 2/17/2024 12:13 PM.  Learner: Patient  Readiness: Acceptance  Method: Explanation  Response: Verbalizes Understanding  Comment: safe mobility techniques, WBAT, post op joint precautions    Education Comments  No comments found.

## 2024-02-17 NOTE — H&P
History Of Present Illness  Avery Huff is a 79 y.o. male presenting with confusion and chills.  Patient had left knee total arthroplasty on Monday. Post-op recovery seemed uneventful to patient and he was discharged Tuesday. States on Thursday, his wife stated he seemed  confused. Patient states his  knee felt more swollen and warm.  States he's had  chills. Denies chest  pain, shortness of breath,  abdominal pain.  Has not had bowel movement since surgery.      Past Medical History  Past Medical History:   Diagnosis Date    BPH (benign prostatic hyperplasia)     CAD (coronary artery disease)     H/O left knee surgery     x 2 as a teen    Heart failure (CMS/[facility])     HLD (hyperlipidemia)     Hypertension     Old myocardial infarction 10/12/2021    History of acute myocardial infarction    Personal history of malignant neoplasm, unspecified     History of malignant neoplasm    Prostate cancer (CMS/[facility])     Skin cancer        Surgical History  Past Surgical History:   Procedure Laterality Date    CARDIAC CATHETERIZATION  2016    OTHER SURGICAL HISTORY  02/15/2022    Knee surgery    OTHER SURGICAL HISTORY  02/15/2022    Hernia repair        Social History  He reports that he quit smoking about 54 years ago. His smoking use included cigarettes. He has never been exposed to tobacco smoke. He has never used smokeless tobacco. He reports that he does not currently use alcohol. He reports that he does not use drugs.    Family History  Family History   Problem Relation Name Age of Onset    Other (CABG) Mother      Heart attack Father      Heart attack Brother      Other (CABG) Brother      Heart attack Brother      Other (CABG) Brother          Allergies  Eszopiclone, Mirabegron, and Oxybutynin    Review of Systems   Constitutional:  Positive for chills.   HENT: Negative.     Eyes: Negative.    Respiratory: Negative.     Cardiovascular: Negative.    Gastrointestinal:  Positive for constipation.   Endocrine: Negative.   "  Genitourinary: Negative.    Musculoskeletal:         Left knee warm and tender from surgery    Skin: Negative.    Neurological: Negative.    Psychiatric/Behavioral:  Positive for confusion.         Physical Exam  Constitutional:       Appearance: He is ill-appearing.   HENT:      Head: Normocephalic and atraumatic.      Mouth/Throat:      Mouth: Mucous membranes are moist.      Pharynx: Oropharynx is clear.   Eyes:      Extraocular Movements: Extraocular movements intact.      Conjunctiva/sclera: Conjunctivae normal.      Pupils: Pupils are equal, round, and reactive to light.   Cardiovascular:      Rate and Rhythm: Normal rate and regular rhythm.      Pulses: Normal pulses.      Heart sounds: Normal heart sounds.   Pulmonary:      Effort: Pulmonary effort is normal.      Breath sounds: Normal breath sounds.   Abdominal:      General: Bowel sounds are normal.      Palpations: Abdomen is soft.      Tenderness: There is no abdominal tenderness.   Musculoskeletal:      Cervical back: Normal range of motion and neck supple.      Comments: Left knee swollen after surgery. No redness, warmth. Dressing dry, intact    Skin:     General: Skin is warm and dry.      Capillary Refill: Capillary refill takes less than 2 seconds.   Neurological:      General: No focal deficit present.      Mental Status: He is alert and oriented to person, place, and time.   Psychiatric:         Mood and Affect: Mood normal.         Behavior: Behavior normal.          Last Recorded Vitals  Blood pressure 148/72, pulse 98, temperature 36.8 °C (98.2 °F), temperature source Temporal, resp. rate 18, height 1.778 m (5' 10\"), weight 86.2 kg (190 lb 0.6 oz), SpO2 98 %.    Relevant Results  Results for orders placed or performed during the hospital encounter of 02/16/24 (from the past 24 hour(s))   Urinalysis with Reflex Microscopic   Result Value Ref Range    Color, Urine Yellow Straw, Yellow    Appearance, Urine Hazy (N) Clear    Specific Gravity, " Urine 1.015 1.005 - 1.035    pH, Urine 5.0 5.0, 5.5, 6.0, 6.5, 7.0, 7.5, 8.0    Protein, Urine NEGATIVE NEGATIVE mg/dL    Glucose, Urine NEGATIVE NEGATIVE mg/dL    Blood, Urine NEGATIVE NEGATIVE    Ketones, Urine NEGATIVE NEGATIVE mg/dL    Bilirubin, Urine NEGATIVE NEGATIVE    Urobilinogen, Urine <2.0 <2.0 mg/dL    Nitrite, Urine NEGATIVE NEGATIVE    Leukocyte Esterase, Urine NEGATIVE NEGATIVE   CBC and Auto Differential   Result Value Ref Range    WBC 10.4 4.4 - 11.3 x10*3/uL    nRBC 0.0 0.0 - 0.0 /100 WBCs    RBC 3.10 (L) 4.50 - 5.90 x10*6/uL    Hemoglobin 10.0 (L) 13.5 - 17.5 g/dL    Hematocrit 30.6 (L) 41.0 - 52.0 %    MCV 99 80 - 100 fL    MCH 32.3 26.0 - 34.0 pg    MCHC 32.7 32.0 - 36.0 g/dL    RDW 13.7 11.5 - 14.5 %    Platelets 214 150 - 450 x10*3/uL    Neutrophils % 71.8 40.0 - 80.0 %    Immature Granulocytes %, Automated 0.7 0.0 - 0.9 %    Lymphocytes % 10.5 13.0 - 44.0 %    Monocytes % 13.2 2.0 - 10.0 %    Eosinophils % 3.5 0.0 - 6.0 %    Basophils % 0.3 0.0 - 2.0 %    Neutrophils Absolute 7.49 (H) 1.60 - 5.50 x10*3/uL    Immature Granulocytes Absolute, Automated 0.07 0.00 - 0.50 x10*3/uL    Lymphocytes Absolute 1.09 0.80 - 3.00 x10*3/uL    Monocytes Absolute 1.38 (H) 0.05 - 0.80 x10*3/uL    Eosinophils Absolute 0.37 0.00 - 0.40 x10*3/uL    Basophils Absolute 0.03 0.00 - 0.10 x10*3/uL   Lactate   Result Value Ref Range    Lactate 1.3 0.4 - 2.0 mmol/L   Troponin I, High Sensitivity   Result Value Ref Range    Troponin I, High Sensitivity 35 (H) 0 - 20 ng/L   D-Dimer, Quantitative Non VTE   Result Value Ref Range    D-Dimer Non VTE, Quant (ng/mL FEU) 2,445 (H) <=500 ng/mL FEU   B-Type Natriuretic Peptide   Result Value Ref Range    BNP 29 0 - 99 pg/mL   ECG 12 lead   Result Value Ref Range    Ventricular Rate 86 BPM    Atrial Rate 86 BPM    VT Interval 170 ms    QRS Duration 108 ms    QT Interval 370 ms    QTC Calculation(Bazett) 442 ms    P Axis 25 degrees    R Axis -28 degrees    T Axis 31 degrees    QRS  Count 14 beats    Q Onset 211 ms    P Onset 126 ms    P Offset 178 ms    T Offset 396 ms    QTC Fredericia 417 ms   Comprehensive metabolic panel   Result Value Ref Range    Glucose 102 (H) 74 - 99 mg/dL    Sodium 135 (L) 136 - 145 mmol/L    Potassium 4.3 3.5 - 5.3 mmol/L    Chloride 103 98 - 107 mmol/L    Bicarbonate 19 (L) 21 - 32 mmol/L    Anion Gap 17 10 - 20 mmol/L    Urea Nitrogen 73 (H) 6 - 23 mg/dL    Creatinine 4.08 (H) 0.50 - 1.30 mg/dL    eGFR 14 (L) >60 mL/min/1.73m*2    Calcium 8.7 8.6 - 10.3 mg/dL    Albumin 3.3 (L) 3.4 - 5.0 g/dL    Alkaline Phosphatase 75 33 - 136 U/L    Total Protein 6.3 (L) 6.4 - 8.2 g/dL    AST 23 9 - 39 U/L    Bilirubin, Total 1.0 0.0 - 1.2 mg/dL    ALT 10 10 - 52 U/L   Troponin I, High Sensitivity   Result Value Ref Range    Troponin I, High Sensitivity 33 (H) 0 - 20 ng/L     ECG 12 lead    Result Date: 2/16/2024  Normal sinus rhythm with sinus arrhythmia Voltage criteria for left ventricular hypertrophy Inferior infarct (cited on or before 06-FEB-2024) Abnormal ECG When compared with ECG of 06-FEB-2024 14:24, No significant change was found See ED provider note for full interpretation and clinical correlation Confirmed by Charu Delacruz ([id number]) on 2/16/2024 5:21:43 PM    CT angio chest for pulmonary embolism    Result Date: 2/16/2024  Interpreted By:  Thom Hart, STUDY: CT ANGIO CHEST FOR PULMONARY EMBOLISM;  2/16/2024 9:39 am   INDICATION: Signs/Symptoms:sob and pain.   COMPARISON: None.   ACCESSION NUMBER(S): [id number]   ORDERING CLINICIAN: JAMILAH FAY   TECHNIQUE: Helical data acquisition of the chest was obtained with 75 mL Omnipaque 350. Images were reformatted in coronal and sagittal planes. Axial and coronal MIP images were created and reviewed.   FINDINGS: POTENTIAL LIMITATIONS OF THE STUDY: None   HEART AND VESSELS: No discrete filling defects within the main pulmonary artery or its branches.   Main pulmonary artery and its branches are normal in caliber.    The thoracic aorta is of normal course and caliber with vascular calcifications.   Dense coronary artery calcifications are seen.The study is not optimized for evaluation of coronary arteries.   The cardiac chambers are not enlarged.   No evidence of pericardial effusion.   MEDIASTINUM AND VERONICA, LOWER NECK AND AXILLA: The visualized thyroid gland is within normal limits.   No evidence of thoracic lymphadenopathy by CT criteria.   Esophagus appears within normal limits as seen.   LUNGS AND AIRWAYS: The trachea and central airways are patent. No endobronchial lesion.   Lungs are clear. Motion artifact throughout the mid and lower lung zones. UPPER ABDOMEN: The visualized subdiaphragmatic structures demonstrate no remarkable findings.   CHEST WALL AND OSSEOUS STRUCTURES: There are no suspicious osseous lesions. Multilevel degenerative changes are present       1.  No pulmonary emboli or confluent airspace disease.     MACRO: None   Signed by: Thom Hart 2/16/2024 9:43 AM Dictation workstation:   [id number]    Vascular US lower extremity venous duplex left    Result Date: 2/16/2024  Interpreted By:  Todd Orozco, STUDY: [facility] US LOWER EXTREMITY VENOUS DUPLEX LEFT;  2/16/2024 8:37 am   INDICATION: Signs/Symptoms:Left leg edema status post knee replacement markedly elevated D-dimer.   COMPARISON: None.   ACCESSION NUMBER(S): [id number]   ORDERING CLINICIAN: CARA KENNY   TECHNIQUE: Vascular ultrasound of the  left lower extremity was performed. Real-time compression views as well as Gray scale, color Doppler and spectral Doppler waveform analysis was performed.   FINDINGS: Evaluation of the visualized portions of the common femoral vein, proximal, mid, and distal femoral vein, and popliteal vein were performed.  Evaluation of the visualized portions of the calf veins was also performed.   The evaluated veins demonstrate normal compressibility. There is intact venous flow demonstrating normal respiratory variability  and normal augmentation of flow with calf compression. Therefore, there is no ultrasonographic evidence for deep vein thrombosis within the evaluated veins.       No sonographic evidence for deep vein thrombosis within the evaluated veins.   MACRO: None.   Signed by: Todd Orozco 2/16/2024 8:39 AM Dictation workstation:   [id number]    CT head wo IV contrast    Result Date: 2/16/2024  Interpreted By:  Jamie Jaramillo, STUDY: CT HEAD WO IV CONTRAST;  2/16/2024 5:48 am   INDICATION: Signs/Symptoms:Confused history of recent knee replacement coronary artery disease.   COMPARISON: None.   ACCESSION NUMBER(S): [id number]   ORDERING CLINICIAN: CARA KENNY   TECHNIQUE: Axial noncontrast CT images of the head.   FINDINGS: Gray-white matter differentiation is maintained. There are no extra-axial collections. No mass effect or midline shift. There is no hydrocephalus. There is mild-to-moderate cerebral volume loss and associated ventricular and sulcal enlargement. There are patchy bilateral periventricular and deep white matter hypodensities suggestive of mild to moderate chronic microvascular ischemic change. There is also chronic caudate body lacunar infarct.   HEMORRHAGE: No acute intracranial hemorrhage.   CALVARIUM: No depressed skull fracture.   EXTRACRANIAL SOFT TISSUES:.. Unremarkable.   PARANASAL SINUSES/MASTOIDS: The visualized paranasal sinuses are well aerated. Mastoid air cells are clear.   ORBITS: Grossly normal.       No evidence of acute intracranial hemorrhage or acute cortical infarct. Mild-to-moderate chronic microvascular ischemic change. If there is concern for acute ischemia consider follow-up brain MRI.     Signed by: Jamie Jaramillo 2/16/2024 6:12 AM Dictation workstation:   [id number]    XR knee left 4+ views    Result Date: 2/16/2024  Interpreted By:  Jamie Jaramillo, STUDY: XR KNEE LEFT 4+ VIEWS; ;  2/16/2024 5:40 am   INDICATION: Signs/Symptoms:Status post left knee replacement left knee.  And  swelling of the leg.   COMPARISON: Left knee radiographs dated 02/12/2024.   ACCESSION NUMBER(S): [id number]   ORDERING CLINICIAN: CARA KENNY   FINDINGS: There are postsurgical changes of left total knee arthroplasty with persistent but decreased suprapatellar knee joint effusion and decreased amount of soft tissue gas.       Recent postsurgical change of left total knee arthroplasty with decreased amount of knee joint effusion and decreased soft tissue gas.     MACRO: None   Signed by: Jamie Jaramillo 2/16/2024 6:09 AM Dictation workstation:   [id number]        Assessment/Plan   Principal Problem:    Acute renal failure (ARF) (CMS/[facility])   Consult nephrology    IV fluids    Avoid nephrotoxic agents    Hold lisinopril     Blood cultures x 2   Active Problems:    Benign essential hypertension   Hold lisinopril    Continue metoprolol    Vitals per order     Coronary arteriosclerosis   Angina  free, stable    Continue beta blocker, ASA, statin     Hyperlipidemia   Statin     Prostate cancer (CMS/[facility])   Insert cui    Concern acute renal failure may be related to obstruction    U/A negative     S/P total knee arthroplasty, left   Consult Dr. Joe    PT/OT    Continue ASA 81 mg twice  daily for DVT prophylaxis    Pain medicine     Constipation    Miralax daily    Docusate twice daily    PRN senna       Nolvia Geiger, APRN-CNP

## 2024-02-17 NOTE — CONSULTS
Reason For Consult  Acute kidney injury    History Of Present Illness  Avery Huff is an 79 y.o. male with history of stage 3a CKD, BPH, prostate cancer, hypertension, CAD, HFpEF and degenerative joint disease, s/p L knee replacement earlier this week. He was admitted with acute kidney injury after presenting with altered mental status.    His SCr had been at his baseline around 1.3 mg/dL just 4 days ago but had bumped to 4.08 mg/dL at presentation. He had not noticed any significant change in urine volumes and had no dysuria or gross hematuria. He had a Robledo catheter placed and had 1.2 L urine volume over a 12-hr period last night.    Of note, he has not been hypotensive since admission. He was on an ACE-I but was not taking NSAIDs. No recent iodinated contrast exposure.     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    Surgical History  He has a past surgical history that includes Other surgical history (02/15/2022); Other surgical history (02/15/2022); and Cardiac catheterization (2016).     Social History  He reports that he quit smoking about 54 years ago. His smoking use included cigarettes. He has never been exposed to tobacco smoke. He has never used smokeless tobacco. He reports that he does not currently use alcohol. He reports that he does not use drugs.  He lives with his wife.    Family History  Family History   Problem Relation Name Age of Onset    Other (CABG) Mother      Heart attack Father      Heart attack Brother      Other (CABG) Brother      Heart attack Brother      Other (CABG) Brother     No known family history of kidney disease.     Medications  Scheduled medications  [START ON 2/18/2024]  "amLODIPine, 2.5 mg, oral, Daily  aspirin, 81 mg, oral, BID  atorvastatin, 20 mg, oral, Nightly  cefTRIAXone, 1 g, intravenous, q24h  docusate sodium, 100 mg, oral, BID  metoprolol succinate XL, 25 mg, oral, Daily  polyethylene glycol, 17 g, oral, Daily      Continuous medications     PRN medications  PRN medications: acetaminophen, HYDROcodone-acetaminophen, ondansetron ODT **OR** [DISCONTINUED] ondansetron, sennosides    Allergies  Eszopiclone, Mirabegron, and Oxybutynin    Review of Systems  Had episodes of nausea and vomiting earlier in the week but no diarrhea.  Other 10 point ROS negative except as stated in HPI.     Physical Exam  Vitals 24HR  Heart Rate:  [83-98]   Temp:  [36.4 °C (97.5 °F)-37.7 °C (99.9 °F)]   Resp:  [15-20]   BP: (120-148)/(63-79)   Height:  [177.8 cm (5' 10\")]   Weight:  [86.2 kg (190 lb 0.6 oz)]   SpO2:  [92 %-98 %]     General: pleasant elderly man, not in distress  Eyes: not pale, anicteric  Neck: supple, no JVD  Lungs: clear bilaterally  Heart: S1 and S2, regular  Abdomen: soft, non tender  Extremities: dressing over LLE with trace edema, no RLE edema  Neuro: awake, oriented x 3        I&O 24HR    Intake/Output Summary (Last 24 hours) at 2/17/2024 1633  Last data filed at 2/17/2024 1600  Gross per 24 hour   Intake 1275 ml   Output 2325 ml   Net -1050 ml       Relevant Results  Results for orders placed or performed during the hospital encounter of 02/16/24 (from the past 24 hour(s))   CBC   Result Value Ref Range    WBC 6.7 4.4 - 11.3 x10*3/uL    nRBC 0.0 0.0 - 0.0 /100 WBCs    RBC 3.16 (L) 4.50 - 5.90 x10*6/uL    Hemoglobin 10.3 (L) 13.5 - 17.5 g/dL    Hematocrit 29.2 (L) 41.0 - 52.0 %    MCV 92 80 - 100 fL    MCH 32.6 26.0 - 34.0 pg    MCHC 35.3 32.0 - 36.0 g/dL    RDW 13.3 11.5 - 14.5 %    Platelets 206 150 - 450 x10*3/uL   Comprehensive metabolic panel   Result Value Ref Range    Glucose 144 (H) 65 - 99 mg/dL    Sodium 138 133 - 145 mmol/L    Potassium 3.9 3.4 - 5.1 mmol/L    " Chloride 109 (H) 97 - 107 mmol/L    Bicarbonate 18 (L) 24 - 31 mmol/L    Urea Nitrogen 55 (H) 8 - 25 mg/dL    Creatinine 2.10 (H) 0.40 - 1.60 mg/dL    eGFR 31 (L) >60 mL/min/1.73m*2    Calcium 8.4 (L) 8.5 - 10.4 mg/dL    Albumin 2.7 (L) 3.5 - 5.0 g/dL    Alkaline Phosphatase 112 35 - 125 U/L    Total Protein 5.8 (L) 5.9 - 7.9 g/dL    AST 45 (H) 5 - 40 U/L    Bilirubin, Total 0.8 0.1 - 1.2 mg/dL    ALT 24 5 - 40 U/L    Anion Gap 11 <=19 mmol/L            Assessment/Plan   79 year old pleasant man with history of stage 3a CKD, BPH, prostate cancer, hypertension, CAD, HFpEF and degenerative joint disease (s/p L knee replacement on 2/12/24), admitted with acute kidney injury.    Non-oliguric DEBBIE on stage 3a CKD  Hypertension  Metabolic acidosis    Baseline SCr was around 1.3 mg/dL just 4 days ago but had bumped to 4.08 mg/dL at presentation.     Prompt improvement to 2.1 mg/dL following Robledo catheter placement raises suspicion for underlying obstructive uropathy even though he had not reported symptoms to suggest urinary retention, and renal US done today (following improvement) did not show hydronephrosis.    He may also have had a co-existing hemodynamic component in the setting of volume depletion and JESSY inhibition. UA was negative for blood or protein, and there is no suspicion for other DEBBIE etiologies.    Agree with holding ACE-i and will continue volume expansion. He's mildly hyponatremic and I will switch hypotonic iv fluid to D5NS at 100 mL/hr.    Continue other care.    Thank you for the opportunity to participate in his care.      Kishore Menendez MD

## 2024-02-17 NOTE — CARE PLAN
Problem: Safety - Adult  Goal: Free from fall injury  Outcome: Progressing   The patient's goals for the shift include      The clinical goals for the shift include Monitor labs and vitals; maintain safety

## 2024-02-18 VITALS
WEIGHT: 190.04 LBS | HEART RATE: 75 BPM | BODY MASS INDEX: 27.21 KG/M2 | DIASTOLIC BLOOD PRESSURE: 70 MMHG | SYSTOLIC BLOOD PRESSURE: 148 MMHG | RESPIRATION RATE: 18 BRPM | TEMPERATURE: 98.6 F | OXYGEN SATURATION: 94 % | HEIGHT: 70 IN

## 2024-02-18 LAB
ALBUMIN SERPL-MCNC: 2.7 G/DL (ref 3.5–5)
ANION GAP SERPL CALC-SCNC: 9 MMOL/L
BUN SERPL-MCNC: 37 MG/DL (ref 8–25)
CALCIUM SERPL-MCNC: 8.5 MG/DL (ref 8.5–10.4)
CHLORIDE SERPL-SCNC: 110 MMOL/L (ref 97–107)
CO2 SERPL-SCNC: 20 MMOL/L (ref 24–31)
CREAT SERPL-MCNC: 1.4 MG/DL (ref 0.4–1.6)
EGFRCR SERPLBLD CKD-EPI 2021: 50 ML/MIN/1.73M*2
GLUCOSE SERPL-MCNC: 130 MG/DL (ref 65–99)
MAGNESIUM SERPL-MCNC: 1.8 MG/DL (ref 1.6–3.1)
PHOSPHATE SERPL-MCNC: 3 MG/DL (ref 2.5–4.5)
POTASSIUM SERPL-SCNC: 4 MMOL/L (ref 3.4–5.1)
SODIUM SERPL-SCNC: 139 MMOL/L (ref 133–145)

## 2024-02-18 PROCEDURE — 2500000005 HC RX 250 GENERAL PHARMACY W/O HCPCS: Performed by: NURSE PRACTITIONER

## 2024-02-18 PROCEDURE — 83735 ASSAY OF MAGNESIUM: CPT | Performed by: INTERNAL MEDICINE

## 2024-02-18 PROCEDURE — 36415 COLL VENOUS BLD VENIPUNCTURE: CPT | Performed by: INTERNAL MEDICINE

## 2024-02-18 PROCEDURE — 97116 GAIT TRAINING THERAPY: CPT | Mod: GP | Performed by: PHYSICAL THERAPIST

## 2024-02-18 PROCEDURE — 80069 RENAL FUNCTION PANEL: CPT | Performed by: INTERNAL MEDICINE

## 2024-02-18 PROCEDURE — 97110 THERAPEUTIC EXERCISES: CPT | Mod: GP | Performed by: PHYSICAL THERAPIST

## 2024-02-18 PROCEDURE — 2500000004 HC RX 250 GENERAL PHARMACY W/ HCPCS (ALT 636 FOR OP/ED): Performed by: INTERNAL MEDICINE

## 2024-02-18 PROCEDURE — 1090000002 HH PPS REVENUE DEBIT

## 2024-02-18 PROCEDURE — 1090000001 HH PPS REVENUE CREDIT

## 2024-02-18 PROCEDURE — 2500000001 HC RX 250 WO HCPCS SELF ADMINISTERED DRUGS (ALT 637 FOR MEDICARE OP): Performed by: INTERNAL MEDICINE

## 2024-02-18 PROCEDURE — 2500000001 HC RX 250 WO HCPCS SELF ADMINISTERED DRUGS (ALT 637 FOR MEDICARE OP): Performed by: NURSE PRACTITIONER

## 2024-02-18 PROCEDURE — 2500000004 HC RX 250 GENERAL PHARMACY W/ HCPCS (ALT 636 FOR OP/ED): Performed by: NURSE PRACTITIONER

## 2024-02-18 RX ORDER — ONDANSETRON 4 MG/1
4 TABLET, ORALLY DISINTEGRATING ORAL EVERY 8 HOURS PRN
Qty: 20 TABLET | Refills: 0 | Status: SHIPPED | OUTPATIENT
Start: 2024-02-18

## 2024-02-18 RX ORDER — AMLODIPINE BESYLATE 2.5 MG/1
5 TABLET ORAL DAILY
Qty: 30 TABLET | Refills: 2 | Status: SHIPPED | OUTPATIENT
Start: 2024-02-18 | End: 2024-02-18 | Stop reason: HOSPADM

## 2024-02-18 RX ORDER — ACETAMINOPHEN 325 MG/1
650 TABLET ORAL EVERY 4 HOURS PRN
Qty: 30 TABLET | Refills: 0 | Status: SHIPPED | OUTPATIENT
Start: 2024-02-18

## 2024-02-18 RX ORDER — LISINOPRIL 20 MG/1
20 TABLET ORAL DAILY
Status: DISCONTINUED | OUTPATIENT
Start: 2024-02-19 | End: 2024-02-18 | Stop reason: HOSPADM

## 2024-02-18 RX ORDER — CALCIUM CARBONATE 200(500)MG
1000 TABLET,CHEWABLE ORAL DAILY
Status: COMPLETED | OUTPATIENT
Start: 2024-02-18 | End: 2024-02-18

## 2024-02-18 RX ADMIN — HYDROCODONE BITARTRATE AND ACETAMINOPHEN 1 TABLET: 5; 325 TABLET ORAL at 07:30

## 2024-02-18 RX ADMIN — POLYETHYLENE GLYCOL 3350 17 G: 17 POWDER, FOR SOLUTION ORAL at 09:03

## 2024-02-18 RX ADMIN — DEXTROSE MONOHYDRATE AND SODIUM CHLORIDE 100 ML/HR: 5; .9 INJECTION, SOLUTION INTRAVENOUS at 05:19

## 2024-02-18 RX ADMIN — AMLODIPINE BESYLATE 2.5 MG: 2.5 TABLET ORAL at 09:04

## 2024-02-18 RX ADMIN — ONDANSETRON 4 MG: 4 TABLET, ORALLY DISINTEGRATING ORAL at 07:30

## 2024-02-18 RX ADMIN — ASPIRIN 81 MG: 81 TABLET, COATED ORAL at 09:04

## 2024-02-18 RX ADMIN — DOCUSATE SODIUM 100 MG: 100 CAPSULE, LIQUID FILLED ORAL at 09:03

## 2024-02-18 RX ADMIN — HYDROCODONE BITARTRATE AND ACETAMINOPHEN 1 TABLET: 5; 325 TABLET ORAL at 13:43

## 2024-02-18 RX ADMIN — HYDROCODONE BITARTRATE AND ACETAMINOPHEN 1 TABLET: 5; 325 TABLET ORAL at 01:05

## 2024-02-18 RX ADMIN — CALCIUM CARBONATE 1000 MG: 500 TABLET, CHEWABLE ORAL at 12:37

## 2024-02-18 RX ADMIN — METOPROLOL SUCCINATE 25 MG: 25 TABLET, EXTENDED RELEASE ORAL at 09:03

## 2024-02-18 ASSESSMENT — COGNITIVE AND FUNCTIONAL STATUS - GENERAL
HELP NEEDED FOR BATHING: A LITTLE
TOILETING: A LITTLE
WALKING IN HOSPITAL ROOM: A LITTLE
DAILY ACTIVITIY SCORE: 19
MOVING TO AND FROM BED TO CHAIR: A LITTLE
STANDING UP FROM CHAIR USING ARMS: A LITTLE
MOBILITY SCORE: 18
DRESSING REGULAR LOWER BODY CLOTHING: A LITTLE
CLIMB 3 TO 5 STEPS WITH RAILING: A LOT
DRESSING REGULAR UPPER BODY CLOTHING: A LITTLE
PERSONAL GROOMING: A LITTLE
TURNING FROM BACK TO SIDE WHILE IN FLAT BAD: A LITTLE

## 2024-02-18 ASSESSMENT — PAIN DESCRIPTION - ORIENTATION
ORIENTATION: LEFT
ORIENTATION: LEFT

## 2024-02-18 ASSESSMENT — PAIN - FUNCTIONAL ASSESSMENT
PAIN_FUNCTIONAL_ASSESSMENT: 0-10

## 2024-02-18 ASSESSMENT — PAIN SCALES - GENERAL
PAINLEVEL_OUTOF10: 5 - MODERATE PAIN
PAINLEVEL_OUTOF10: 8
PAINLEVEL_OUTOF10: 8
PAINLEVEL_OUTOF10: 2
PAINLEVEL_OUTOF10: 8
PAINLEVEL_OUTOF10: 5 - MODERATE PAIN

## 2024-02-18 ASSESSMENT — PAIN DESCRIPTION - LOCATION
LOCATION: KNEE
LOCATION: KNEE

## 2024-02-18 NOTE — PROGRESS NOTES
"Avery Huff is a 79 y.o. male on day 2 of admission presenting with Acute renal failure (ARF) (CMS/[facility]).    Subjective   He feels well.  The  catheter has been removed.  He has not yet voided.  Creatinine down to 1.4       Objective     Physical Exam  Awake and alert.  Normal respiratory pattern      Last Recorded Vitals  Blood pressure 148/70, pulse 75, temperature 37 °C (98.6 °F), temperature source Temporal, resp. rate 18, height 1.778 m (5' 10\"), weight 86.2 kg (190 lb 0.6 oz), SpO2 94 %.  Intake/Output last 3 Shifts:  I/O last 3 completed shifts:  In: 4512.5 (52.3 mL/kg) [P.O.:825; I.V.:3587.5 (41.6 mL/kg); IV Piggyback:100]  Out: 3025 (35.1 mL/kg) [Urine:3025 (1 mL/kg/hr)]  Weight: 86.2 kg     Relevant Results      Scheduled medications  aspirin, 81 mg, oral, BID  atorvastatin, 20 mg, oral, Nightly  cefTRIAXone, 1 g, intravenous, q24h  docusate sodium, 100 mg, oral, BID  [START ON 2/19/2024] lisinopril, 20 mg, oral, Daily  metoprolol succinate XL, 25 mg, oral, Daily  polyethylene glycol, 17 g, oral, Daily      Continuous medications     PRN medications  PRN medications: acetaminophen, HYDROcodone-acetaminophen, ondansetron ODT **OR** [DISCONTINUED] ondansetron, sennosides  Results for orders placed or performed during the hospital encounter of 02/16/24 (from the past 24 hour(s))   Renal Function Panel   Result Value Ref Range    Glucose 130 (H) 65 - 99 mg/dL    Sodium 139 133 - 145 mmol/L    Potassium 4.0 3.4 - 5.1 mmol/L    Chloride 110 (H) 97 - 107 mmol/L    Bicarbonate 20 (L) 24 - 31 mmol/L    Urea Nitrogen 37 (H) 8 - 25 mg/dL    Creatinine 1.40 0.40 - 1.60 mg/dL    eGFR 50 (L) >60 mL/min/1.73m*2    Calcium 8.5 8.5 - 10.4 mg/dL    Phosphorus 3.0 2.5 - 4.5 mg/dL    Albumin 2.7 (L) 3.5 - 5.0 g/dL    Anion Gap 9 <=19 mmol/L   Magnesium   Result Value Ref Range    Magnesium 1.80 1.60 - 3.10 mg/dL                            Assessment/Plan   Principal Problem:    Acute renal failure (ARF) (CMS/HCC)  Active " Problems:    Benign essential hypertension    Coronary arteriosclerosis    Hyperlipidemia    Prostate cancer (CMS/HCC)    S/P total knee arthroplasty, left    Urinary Retention:  Catheter removed for a voiding trial.  If he voids well and empties adequately he an be discharged.  He should follow-up with me or his own urologist in 1-2 days.             Alan Moeller MD

## 2024-02-18 NOTE — CARE PLAN
Problem: Pain - Adult  Goal: Verbalizes/displays adequate comfort level or baseline comfort level  Outcome: Progressing     Problem: Safety - Adult  Goal: Free from fall injury  Outcome: Progressing     Problem: Discharge Planning  Goal: Discharge to home or other facility with appropriate resources  Outcome: Progressing   The patient's goals for the shift include      The clinical goals for the shift include Pain management; antibiotic therapy; PT/OT

## 2024-02-18 NOTE — DISCHARGE SUMMARY
Discharge Diagnosis  Acute renal failure (ARF) (CMS/[facility])    Issues Requiring Follow-Up  Outpatient follow-up with orthopedic surgery primary care physician and urology    Discharge Meds     Your medication list        START taking these medications        Instructions Last Dose Given Next Dose Due   acetaminophen 325 mg tablet  Commonly known as: Tylenol      Take 2 tablets (650 mg) by mouth every 4 hours if needed for mild pain (1 - 3).       ondansetron ODT 4 mg disintegrating tablet  Commonly known as: Zofran-ODT      Take 1 tablet (4 mg) by mouth every 8 hours if needed for nausea.              CHANGE how you take these medications        Instructions Last Dose Given Next Dose Due   zolpidem 10 mg tablet  Commonly known as: Ambien  What changed: See the new instructions.      TAKE 1/2 TO 1 TABLET BY MOUTH  ONCE DAILY AT BEDTIME              CONTINUE taking these medications        Instructions Last Dose Given Next Dose Due   allopurinol 300 mg tablet  Commonly known as: Zyloprim           aspirin 81 mg EC tablet      Take 1 tablet (81 mg) by mouth 2 times a day.       atorvastatin 20 mg tablet  Commonly known as: Lipitor           HYDROcodone-acetaminophen 7.5-325 mg tablet  Commonly known as: Norco      Take 1 tablet by mouth every 6 hours if needed for moderate pain (4 - 6).       lisinopril 20 mg tablet           metoprolol succinate XL 25 mg 24 hr tablet  Commonly known as: Toprol-XL           Nitrostat 0.4 mg SL tablet  Generic drug: nitroglycerin           solifenacin 10 mg tablet  Commonly known as: VESIcare                     Where to Get Your Medications        These medications were sent to [id number] - [place], [place] - [address]  [address] [address]      Phone: [phone]   acetaminophen 325 mg tablet  ondansetron ODT 4 mg disintegrating tablet         Test Results Pending At Discharge  Pending Labs       Order Current Status    Blood Culture  Preliminary result    Blood Culture Preliminary result            Hospital Course   79-year-old  male status post left total knee replacement presents with cute renal failure Robledo catheter was placed patient was making greater than 1 L of urine he was hydrated no episodes of hypotension but was taking an ACE inhibitor which was held his creatinine improved from 4.8 to his baseline of 1.4 he was given a trial of void in the hospital which is currently being monitored and will be discharged home later today.  He was evaluated by nephrology and orthopedic surgery.  He was empirically initiated on Rocephin for concerns of possible low-grade fever cultures were all negative antibiotics were discontinued.  In his emergency room stay [facility] he underwent a CT angiogram which was negative for pulmonary embolism lower extremity venous duplex which was negative for DVT.  He has pre-existing CKD stage III AA is returned to his usual baseline.  Was additionally seen by urology for history of BPH and prostate cancer status post radiation follow with his usual urologist at his neck scheduled appointment.  Per nephrology he is to resume his ACE inhibitor at discharge    Pertinent Physical Exam At Time of Discharge  Physical Exam  Vitals and nursing note reviewed.   Constitutional:       Appearance: Normal appearance.   HENT:      Head: Normocephalic and atraumatic.      Mouth/Throat:      Mouth: Mucous membranes are moist.   Eyes:      Extraocular Movements: Extraocular movements intact.      Pupils: Pupils are equal, round, and reactive to light.   Cardiovascular:      Rate and Rhythm: Normal rate and regular rhythm.      Pulses: Normal pulses.      Heart sounds: Normal heart sounds.   Pulmonary:      Effort: Pulmonary effort is normal.      Breath sounds: Normal breath sounds.   Abdominal:      Palpations: Abdomen is soft.   Musculoskeletal:         General: Normal range of motion.      Cervical back: Normal  range of motion and neck supple.   Skin:     General: Skin is warm and dry.      Capillary Refill: Capillary refill takes less than 2 seconds.   Neurological:      General: No focal deficit present.      Mental Status: He is alert and oriented to person, place, and time. Mental status is at baseline.   Psychiatric:         Mood and Affect: Mood normal.       Results for orders placed or performed during the hospital encounter of 02/16/24 (from the past 24 hour(s))   Renal Function Panel   Result Value Ref Range    Glucose 130 (H) 65 - 99 mg/dL    Sodium 139 133 - 145 mmol/L    Potassium 4.0 3.4 - 5.1 mmol/L    Chloride 110 (H) 97 - 107 mmol/L    Bicarbonate 20 (L) 24 - 31 mmol/L    Urea Nitrogen 37 (H) 8 - 25 mg/dL    Creatinine 1.40 0.40 - 1.60 mg/dL    eGFR 50 (L) >60 mL/min/1.73m*2    Calcium 8.5 8.5 - 10.4 mg/dL    Phosphorus 3.0 2.5 - 4.5 mg/dL    Albumin 2.7 (L) 3.5 - 5.0 g/dL    Anion Gap 9 <=19 mmol/L   Magnesium   Result Value Ref Range    Magnesium 1.80 1.60 - 3.10 mg/dL       Outpatient Follow-Up  Future Appointments   Date Time Provider Department Center   2/19/2024 To Be Determined Viviane Hendrix, [facility]   2/22/2024 To Be Determined Viviane Hendrix, [facility]   2/26/2024 To Be Determined Viviane Hendrix, [facility]   2/28/2024 To Be Determined Skye Resendez, PT [facility]   3/4/2024  9:15 AM Cas Trejo, PT [email or web address]   4/30/2024  9:15 AM Maday Koo MD [id number] [facility]   7/11/2024 11:30 AM RAYMUNDO Moreno-CNP [id number] [facility]         Dominguez Castillo DO

## 2024-02-18 NOTE — NURSING NOTE
Urinary uci catheter removed. Patient tolerated well. Patient aware to use urinal to measure urine and to inform staff as soon as he voids so we can scan bladder. Patient expresses understanding.

## 2024-02-18 NOTE — DISCHARGE INSTR - AVS FIRST PAGE
79-year-old  male status post left total knee replacement presents with cute renal failure Robledo catheter was placed patient was making greater than 1 L of urine he was hydrated no episodes of hypotension but was taking an ACE inhibitor which was held his creatinine improved from 4.8 to his baseline of 1.4 he was given a trial of void in the hospital which is currently being monitored and will be discharged home later today.  He was evaluated by nephrology and orthopedic surgery.  He was empirically initiated on Rocephin for concerns of possible low-grade fever cultures were all negative antibiotics were discontinued.  In his emergency room stay [facility] he underwent a CT angiogram which was negative for pulmonary embolism lower extremity venous duplex which was negative for DVT.  He has pre-existing CKD stage III AA is returned to his usual baseline.  Was additionally seen by urology for history of BPH and prostate cancer status post radiation follow with his usual urologist at his neck scheduled appointment.  Per nephrology he is to resume his ACE inhibitor at discharge

## 2024-02-18 NOTE — CARE PLAN
The patient's goals for the shift include  pain management and discharge to Rehab.      The clinical goals for the shift include pain management, urinary cui catheter removal, maintain safety/comfort, monitor labs/VS, increase activity/mobility tolerance, and work with PT/OT.    Problem: Pain - Adult  Goal: Verbalizes/displays adequate comfort level or baseline comfort level  Outcome: Progressing     Problem: Pain  Goal: Takes deep breaths with improved pain control throughout the shift  Outcome: Progressing  Goal: Turns in bed with improved pain control throughout the shift  Outcome: Progressing  Goal: Walks with improved pain control throughout the shift  Outcome: Progressing  Goal: Performs ADL's with improved pain control throughout shift  Outcome: Progressing  Goal: Participates in PT with improved pain control throughout the shift  Outcome: Progressing  Goal: Free from opioid side effects throughout the shift  Outcome: Progressing  Goal: Free from acute confusion related to pain meds throughout the shift  Outcome: Progressing     Problem: Chronic Conditions and Co-morbidities  Goal: Patient's chronic conditions and co-morbidity symptoms are monitored and maintained or improved  Outcome: Progressing     Problem: Fall/Injury  Goal: Not fall by end of shift  Outcome: Progressing  Goal: Be free from injury by end of the shift  Outcome: Progressing  Goal: Verbalize understanding of personal risk factors for fall in the hospital  Outcome: Progressing  Goal: Verbalize understanding of risk factor reduction measures to prevent injury from fall in the home  Outcome: Progressing  Goal: Use assistive devices by end of the shift  Outcome: Progressing  Goal: Pace activities to prevent fatigue by end of the shift  Outcome: Progressing     Problem: Deep Vein Thrombosis  Goal: I will remain free from complications of deep vein thrombosis and maintain current level of mobility  Outcome: Progressing     Problem: Daily  Care  Goal: Daily care needs are met  Outcome: Progressing     Problem: Psychosocial Needs  Goal: Demonstrates ability to cope with hospitalization/illness  Outcome: Progressing  Goal: Collaborate with me, my family, and caregiver to identify my specific goals  Outcome: Progressing     Problem: Discharge Barriers  Goal: My discharge needs are met  Outcome: Progressing

## 2024-02-18 NOTE — PROGRESS NOTES
Physical Therapy    Physical Therapy Treatment    Patient Name: Avery Huff  MRN: [id number]  Today's Date: 2/18/2024  Time Calculation  Start Time: 1051  Stop Time: 1118  Time Calculation (min): 27 min       Assessment/Plan         PT Plan  Treatment/Interventions: Bed mobility, Transfer training, Gait training, Balance training, Strengthening, Endurance training, Range of motion, Therapeutic exercise, Therapeutic activity, Home exercise program  PT Plan: Skilled PT  PT Frequency: 5 times per week  PT Discharge Recommendations: Low intensity level of continued care  Equipment Recommended upon Discharge: Wheeled walker  PT Recommended Transfer Status: Assist x1, Assistive device  PT - OK to Discharge: Yes      General Visit Information:   PT  Visit  PT Received On: 02/18/24  General  General Comment: Pt was sitting in bedsdie chair when PT entered room, was agreeable to PT.    Subjective   Precautions:     Vital Signs:       Objective   Pain:  Pain Assessment  Pain Assessment: 0-10  Pain Score: 5 - Moderate pain  Pain Type: Surgical pain  Pain Location: Knee  Pain Orientation: Left  Pain Interventions: Cold applied (CP re-applied at conclusion of PT session)  Response to Interventions: 4-5/10  Cognition:  Cognition  Overall Cognitive Status: Within Functional Limits  Orientation Level: Oriented X4  Postural Control:     Extremity/Trunk Assessments:    Activity Tolerance:     Treatments:  Therapeutic Exercise  Therapeutic Exercise Performed: Yes  Therapeutic Exercise Activity 1: heel slides x15, x5 more with RLE overpressure for 5-6s ea  Therapeutic Exercise Activity 2: LAQ  x12 ea  Therapeutic Exercise Activity 3: hip flexion x20  Therapeutic Exercise Activity 4: AP's HR/TR x25  Therapeutic Exercise Activity 5: hip abd abd with PT resistance and hip add with pillow    Therapeutic Activity  Therapeutic Activity Performed: Yes  Therapeutic Activity 1: STS from chair, ambulate to restroom, stnading ADL's with reaching  and facial hygeine, toileting tasks with urinal so nursing could measure output and pt had to pass urine since catheter was removed this morning.    Balance/Neuromuscular Re-Education  Balance/Neuromuscular Re-Education Activity Performed: Yes  Balance/Neuromuscular Re-Education Activity 1: Standing ADL's and toileting. Standing pregait stepping and Ws to increase LLE WB and knee extension on LLE in stance phase. (pt able to stand 30 sec unsupported with EO, 30sec unsupported EC with some trunk sway, no dizziness.)    Manual Therapy  Manual Therapy Performed:  (some overpressure provided to increase knee flexion during heel slides.)    Ambulation/Gait Training  Ambulation/Gait Training Performed: Yes  Ambulation/Gait Training 1  Surface 1: Level tile  Device 1: Rolling walker  Gait Support Devices: Gait belt  Assistance 1: Distant supervision  Quality of Gait 1: Inconsistent stride length, Decreased step length, Antalgic  Comments/Distance (ft) 1: pt ambulated 75' with RW, supervision, slower with turns, uneven step length, slight knee flexion in LLE with fwd advancement and heel strike.       Outcome Measures:       Education Documentation  No documentation found.  Education Comments  No comments found.        OP EDUCATION:       Encounter Problems       Encounter Problems (Active)       Balance       Standing Balance (Progressing)       Start:  02/17/24    Expected End:  03/02/24       Pt will demonstrate good static standing balance to promote safe participation with out of bed activity, transfers, and mobility              Mobility       Ambulation (Progressing)       Start:  02/17/24    Expected End:  03/02/24       Pt will ambulate 150' modified independent assist with walker to promote safe home mobility              Pain - Adult          Safety       Safe Mobility Techniques (Progressing)       Start:  02/17/24    Expected End:  03/02/24       Pt will correctly identify and demonstrate safe mobility  techniques to reduce their risks for falls during their acute care stay            Goal 2 (Progressing)       Start:  02/17/24    Expected End:  03/02/24       Pt will correctly verbalize 100% of their post op precautions and correctly demonstrate these during functional movement without cuing needs from therapist              Transfers       Supine to sit (Progressing)       Start:  02/17/24    Expected End:  03/02/24       Pt will transfer supine to sitting at edge of bed with modified independent assist to promote acute care out of bed activity           Sit to stand (Progressing)       Start:  02/17/24    Expected End:  03/02/24       Pt will transfer sit to standing position with modified independent assist and walker to promote safe out of bed activity

## 2024-02-18 NOTE — PROGRESS NOTES
Avery Huff is a 79 y.o. male on day 2 of admission presenting with Acute renal failure (ARF) (CMS/formerly [facility]).      Subjective   Robledo catheter was removed less than an hour ago - yet to urinate.  No suprapubic discomfort.  No dyspnea at rest.       Scheduled medications  amLODIPine, 2.5 mg, oral, Daily  aspirin, 81 mg, oral, BID  atorvastatin, 20 mg, oral, Nightly  cefTRIAXone, 1 g, intravenous, q24h  docusate sodium, 100 mg, oral, BID  metoprolol succinate XL, 25 mg, oral, Daily  polyethylene glycol, 17 g, oral, Daily      Continuous medications  D5 % and 0.9 % sodium chloride, 100 mL/hr, Last Rate: 100 mL/hr (02/18/24 0519)      PRN medications  PRN medications: acetaminophen, HYDROcodone-acetaminophen, ondansetron ODT **OR** [DISCONTINUED] ondansetron, sennosides      Objective      Vitals 24HR  Heart Rate:  [75-88]   Temp:  [36.9 °C (98.4 °F)-37.7 °C (99.9 °F)]   Resp:  [17-20]   BP: (130-148)/(60-79)   SpO2:  [94 %-96 %]     General: pleasant elderly man, not in distress  Lungs: clear bilaterally  Heart: S1 and S2, regular  Abdomen: soft, non tender  Extremities: dressing over L knee, LLE edema, no RLE edema  Neuro: awake and interactive    Intake/Output last 3 Shifts:    Intake/Output Summary (Last 24 hours) at 2/18/2024 1036  Last data filed at 2/18/2024 0942  Gross per 24 hour   Intake 3662.5 ml   Output 2200 ml   Net 1462.5 ml       Relevant Results    Results for orders placed or performed during the hospital encounter of 02/16/24 (from the past 24 hour(s))   Renal Function Panel   Result Value Ref Range    Glucose 130 (H) 65 - 99 mg/dL    Sodium 139 133 - 145 mmol/L    Potassium 4.0 3.4 - 5.1 mmol/L    Chloride 110 (H) 97 - 107 mmol/L    Bicarbonate 20 (L) 24 - 31 mmol/L    Urea Nitrogen 37 (H) 8 - 25 mg/dL    Creatinine 1.40 0.40 - 1.60 mg/dL    eGFR 50 (L) >60 mL/min/1.73m*2    Calcium 8.5 8.5 - 10.4 mg/dL    Phosphorus 3.0 2.5 - 4.5 mg/dL    Albumin 2.7 (L) 3.5 - 5.0 g/dL    Anion Gap 9 <=19 mmol/L    Magnesium   Result Value Ref Range    Magnesium 1.80 1.60 - 3.10 mg/dL       Assessment/Plan      Acute kidney injury, 2/2 urinary retention  Hypertension  Hyponatremia, improved  Metabolic acidosis, improving    Glad to see sustained renal recovery and lytes are controlled. Anticipate continued improvement in the absence of new renal insult.    Appreciate urologist's input - clarified that he truly had urinary retention (1.2 L urine volume was obtained after Robledo catheter was placed at [facility]). Robledo catheter was removed this morning and he will get a bedside bladder scan to confirm he does not have significant post-void residual volume.    Will discontinue iv fluids and restart lisinopril 20 mg po daily.    Ok to discharge from renal point of view. No renal follow-up needed but we will be happy to see him prn.       Kishore Menendez MD

## 2024-02-18 NOTE — NURSING NOTE
Assumed care of patient. Patient awake A&O x4, resting in bed. Patient states he's nauseated and requesting zofran and pain to left knee 8/10. Patient informed I will be back with zofran and norco. Patient expresses understanding. Call light within reach. Will continue plan of care.

## 2024-02-18 NOTE — PROGRESS NOTES
"Avery Huff is a 79 y.o. male on day 2 of admission presenting with Acute renal failure (ARF) (CMS/[facility]).    Subjective   Seen and examined.  Episode of nausea this morning resolved with oral Zofran we will discontinue Robledo catheter trial of void today with postvoid residuals anticipating discharge later today       Objective     Physical Exam  Vitals and nursing note reviewed.   Constitutional:       Appearance: Normal appearance.   HENT:      Head: Normocephalic and atraumatic.      Mouth/Throat:      Mouth: Mucous membranes are moist.   Eyes:      Extraocular Movements: Extraocular movements intact.      Pupils: Pupils are equal, round, and reactive to light.   Cardiovascular:      Rate and Rhythm: Normal rate and regular rhythm.      Pulses: Normal pulses.      Heart sounds: Normal heart sounds.   Pulmonary:      Effort: Pulmonary effort is normal.      Breath sounds: Normal breath sounds.   Abdominal:      Palpations: Abdomen is soft.   Musculoskeletal:         General: Normal range of motion.      Cervical back: Normal range of motion and neck supple.   Skin:     General: Skin is warm and dry.      Capillary Refill: Capillary refill takes less than 2 seconds.   Neurological:      General: No focal deficit present.      Mental Status: He is alert and oriented to person, place, and time. Mental status is at baseline.   Psychiatric:         Mood and Affect: Mood normal.         Last Recorded Vitals  Blood pressure 148/70, pulse 75, temperature 37 °C (98.6 °F), temperature source Temporal, resp. rate 18, height 1.778 m (5' 10\"), weight 86.2 kg (190 lb 0.6 oz), SpO2 94 %.  Intake/Output last 3 Shifts:  I/O last 3 completed shifts:  In: 4512.5 (52.3 mL/kg) [P.O.:825; I.V.:3587.5 (41.6 mL/kg); IV Piggyback:100]  Out: 3025 (35.1 mL/kg) [Urine:3025 (1 mL/kg/hr)]  Weight: 86.2 kg     Relevant Results              Results for orders placed or performed during the hospital encounter of 02/16/24 (from the past 24 " hour(s))   Renal Function Panel   Result Value Ref Range    Glucose 130 (H) 65 - 99 mg/dL    Sodium 139 133 - 145 mmol/L    Potassium 4.0 3.4 - 5.1 mmol/L    Chloride 110 (H) 97 - 107 mmol/L    Bicarbonate 20 (L) 24 - 31 mmol/L    Urea Nitrogen 37 (H) 8 - 25 mg/dL    Creatinine 1.40 0.40 - 1.60 mg/dL    eGFR 50 (L) >60 mL/min/1.73m*2    Calcium 8.5 8.5 - 10.4 mg/dL    Phosphorus 3.0 2.5 - 4.5 mg/dL    Albumin 2.7 (L) 3.5 - 5.0 g/dL    Anion Gap 9 <=19 mmol/L   Magnesium   Result Value Ref Range    Magnesium 1.80 1.60 - 3.10 mg/dL     Scheduled medications  amLODIPine, 2.5 mg, oral, Daily  aspirin, 81 mg, oral, BID  atorvastatin, 20 mg, oral, Nightly  cefTRIAXone, 1 g, intravenous, q24h  docusate sodium, 100 mg, oral, BID  metoprolol succinate XL, 25 mg, oral, Daily  polyethylene glycol, 17 g, oral, Daily      Continuous medications  D5 % and 0.9 % sodium chloride, 100 mL/hr, Last Rate: 100 mL/hr (02/18/24 0519)      PRN medications  PRN medications: acetaminophen, HYDROcodone-acetaminophen, ondansetron ODT **OR** [DISCONTINUED] ondansetron, sennosides      This patient has a urinary catheter   Reason for the urinary catheter remaining today? Urine catheter unnecessary, will be removed today               Assessment/Plan   Principal Problem:    Acute renal failure (ARF) (CMS/[facility])  Active Problems:    Benign essential hypertension    Coronary arteriosclerosis    Hyperlipidemia    Prostate cancer (CMS/[facility])    S/P total knee arthroplasty, left    Remove Robledo catheter for voiding trial with postvoid residuals dissipating discharge to home today       I spent 40 minutes in the professional and overall care of this patient.      Dominguez Castillo DO

## 2024-02-19 ENCOUNTER — HOME CARE VISIT (OUTPATIENT)
Dept: HOME HEALTH SERVICES | Facility: HOME HEALTH | Age: 79
End: 2024-02-19
Payer: MEDICARE

## 2024-02-19 PROCEDURE — 1090000001 HH PPS REVENUE CREDIT

## 2024-02-19 PROCEDURE — 1090000002 HH PPS REVENUE DEBIT

## 2024-02-20 ENCOUNTER — HOME CARE VISIT (OUTPATIENT)
Dept: HOME HEALTH SERVICES | Facility: HOME HEALTH | Age: 79
End: 2024-02-20
Payer: MEDICARE

## 2024-02-20 PROCEDURE — 1090000001 HH PPS REVENUE CREDIT

## 2024-02-20 PROCEDURE — 1090000002 HH PPS REVENUE DEBIT

## 2024-02-21 ENCOUNTER — APPOINTMENT (OUTPATIENT)
Dept: PRIMARY CARE | Facility: CLINIC | Age: 79
End: 2024-02-21
Payer: MEDICARE

## 2024-02-21 LAB
BACTERIA BLD CULT: NORMAL
BACTERIA BLD CULT: NORMAL

## 2024-02-21 PROCEDURE — 1090000001 HH PPS REVENUE CREDIT

## 2024-02-21 PROCEDURE — 1090000002 HH PPS REVENUE DEBIT

## 2024-02-22 PROCEDURE — 1090000001 HH PPS REVENUE CREDIT

## 2024-02-22 PROCEDURE — 1090000002 HH PPS REVENUE DEBIT

## 2024-02-23 ENCOUNTER — OFFICE VISIT (OUTPATIENT)
Dept: PRIMARY CARE | Facility: CLINIC | Age: 79
End: 2024-02-23
Payer: MEDICARE

## 2024-02-23 ENCOUNTER — TELEPHONE (OUTPATIENT)
Dept: INPATIENT UNIT | Facility: HOSPITAL | Age: 79
End: 2024-02-23

## 2024-02-23 VITALS
WEIGHT: 191 LBS | SYSTOLIC BLOOD PRESSURE: 122 MMHG | DIASTOLIC BLOOD PRESSURE: 70 MMHG | BODY MASS INDEX: 27.41 KG/M2 | OXYGEN SATURATION: 95 % | HEART RATE: 77 BPM

## 2024-02-23 DIAGNOSIS — I10 BENIGN ESSENTIAL HYPERTENSION: ICD-10-CM

## 2024-02-23 DIAGNOSIS — N40.1 BENIGN NON-NODULAR PROSTATIC HYPERPLASIA WITH LOWER URINARY TRACT SYMPTOMS: ICD-10-CM

## 2024-02-23 DIAGNOSIS — Z96.652 S/P TOTAL KNEE ARTHROPLASTY, LEFT: Primary | ICD-10-CM

## 2024-02-23 DIAGNOSIS — I25.10 CORONARY ARTERIOSCLEROSIS: ICD-10-CM

## 2024-02-23 DIAGNOSIS — I50.42 CHRONIC COMBINED SYSTOLIC AND DIASTOLIC HEART FAILURE (MULTI): ICD-10-CM

## 2024-02-23 DIAGNOSIS — E78.2 MIXED HYPERLIPIDEMIA: ICD-10-CM

## 2024-02-23 DIAGNOSIS — N17.9 ACUTE RENAL FAILURE, UNSPECIFIED ACUTE RENAL FAILURE TYPE (CMS-HCC): ICD-10-CM

## 2024-02-23 DIAGNOSIS — C61 PROSTATE CANCER (MULTI): ICD-10-CM

## 2024-02-23 PROCEDURE — 3074F SYST BP LT 130 MM HG: CPT | Performed by: INTERNAL MEDICINE

## 2024-02-23 PROCEDURE — 1111F DSCHRG MED/CURRENT MED MERGE: CPT | Performed by: INTERNAL MEDICINE

## 2024-02-23 PROCEDURE — 99214 OFFICE O/P EST MOD 30 MIN: CPT | Performed by: INTERNAL MEDICINE

## 2024-02-23 PROCEDURE — 1036F TOBACCO NON-USER: CPT | Performed by: INTERNAL MEDICINE

## 2024-02-23 PROCEDURE — 1090000001 HH PPS REVENUE CREDIT

## 2024-02-23 PROCEDURE — 1090000002 HH PPS REVENUE DEBIT

## 2024-02-23 PROCEDURE — 1125F AMNT PAIN NOTED PAIN PRSNT: CPT | Performed by: INTERNAL MEDICINE

## 2024-02-23 PROCEDURE — 3078F DIAST BP <80 MM HG: CPT | Performed by: INTERNAL MEDICINE

## 2024-02-23 PROCEDURE — 1159F MED LIST DOCD IN RCRD: CPT | Performed by: INTERNAL MEDICINE

## 2024-02-23 PROCEDURE — 1160F RVW MEDS BY RX/DR IN RCRD: CPT | Performed by: INTERNAL MEDICINE

## 2024-02-23 ASSESSMENT — COLUMBIA-SUICIDE SEVERITY RATING SCALE - C-SSRS
1. IN THE PAST MONTH, HAVE YOU WISHED YOU WERE DEAD OR WISHED YOU COULD GO TO SLEEP AND NOT WAKE UP?: NO
2. HAVE YOU ACTUALLY HAD ANY THOUGHTS OF KILLING YOURSELF?: NO
6. HAVE YOU EVER DONE ANYTHING, STARTED TO DO ANYTHING, OR PREPARED TO DO ANYTHING TO END YOUR LIFE?: NO

## 2024-02-23 ASSESSMENT — PATIENT HEALTH QUESTIONNAIRE - PHQ9
1. LITTLE INTEREST OR PLEASURE IN DOING THINGS: NOT AT ALL
SUM OF ALL RESPONSES TO PHQ9 QUESTIONS 1 AND 2: 0
2. FEELING DOWN, DEPRESSED OR HOPELESS: NOT AT ALL

## 2024-02-23 ASSESSMENT — ENCOUNTER SYMPTOMS
PALPITATIONS: 0
ARTHRALGIAS: 1
ACTIVITY CHANGE: 0
PSYCHIATRIC NEGATIVE: 1
DIARRHEA: 0
VOMITING: 0
CONSTIPATION: 1
DYSURIA: 0
CARDIOVASCULAR NEGATIVE: 1
ABDOMINAL PAIN: 0
LOSS OF SENSATION IN FEET: 0
SHORTNESS OF BREATH: 0
NAUSEA: 0
DEPRESSION: 0
CONSTITUTIONAL NEGATIVE: 1
ABDOMINAL DISTENTION: 0
COUGH: 0
OCCASIONAL FEELINGS OF UNSTEADINESS: 0

## 2024-02-23 ASSESSMENT — PAIN SCALES - GENERAL: PAINLEVEL: 7

## 2024-02-23 NOTE — PROGRESS NOTES
Subjective   Patient ID: Avery Huff is a 79 y.o. male who presents for Follow-up (Hosp appt).    S/P LTKR--complicated by ARF and delirium-now more himself but can't sleep or poop. Has no appetite. Reports no changes in his appetite. Reviewed hospital records.     CAD with CHF-stable on meds-no NTG use-managed by Dr Edgar.     Hypertension-compliant and stable on current medications BP Readings from Last 3 Encounters:  01/31/24 : 122/66  01/11/24 : 131/79  08/21/23 : 112/64       Hyperlipidemia-stable and compliant on meds. Taking. Lab Results       Component                Value               Date                       LDLCALC                  56 (L)              01/31/2023          H/O prostate cancer-now with urinary issue and ED-on vesicare    Kidney stone-uric acid  . Taking allopurinol.   Lab Results       Component                Value               Date                       URICACID                 5.5                 01/31/2023          Chronic insomnia-relies on zolpidem to sleep    Exercise -golfs, cares for house and yard;       Diet   -low fat                Review of Systems   Constitutional: Negative.  Negative for activity change.        Not sleeping;  no appetitie   Respiratory:  Negative for cough and shortness of breath.    Cardiovascular: Negative.  Negative for chest pain, palpitations and leg swelling.   Gastrointestinal:  Positive for constipation. Negative for abdominal distention, abdominal pain, diarrhea, nausea and vomiting.   Genitourinary:  Negative for dysuria.   Musculoskeletal:  Positive for arthralgias (catrina L knee).   Skin:  Negative for rash.   Psychiatric/Behavioral: Negative.         Objective   /70   Pulse 77   Wt 86.6 kg (191 lb)   SpO2 95%   BMI 27.41 kg/m²     Physical Exam  Vitals reviewed.   Constitutional:       General: He is not in acute distress.     Appearance: Normal appearance.      Comments: Using walker    Cardiovascular:      Rate and Rhythm: Normal  rate and regular rhythm.      Heart sounds: Normal heart sounds. No murmur heard.     No gallop.   Pulmonary:      Breath sounds: Normal breath sounds. No wheezing, rhonchi or rales.   Musculoskeletal:      Comments: Defer to Dr Joe   Lymphadenopathy:      Cervical: No cervical adenopathy.   Skin:     General: Skin is warm and dry.      Findings: No rash.   Neurological:      General: No focal deficit present.      Mental Status: He is alert and oriented to person, place, and time.   Psychiatric:         Mood and Affect: Mood normal.         Assessment/Plan   Diagnoses and all orders for this visit:  S/P total knee arthroplasty, left  Benign essential hypertension  Coronary arteriosclerosis  Chronic combined systolic and diastolic heart failure (CMS/HCC)  Mixed hyperlipidemia  Acute renal failure, unspecified acute renal failure type (CMS/HCC)  -     Basic Metabolic Panel; Future  Benign non-nodular prostatic hyperplasia with lower urinary tract symptoms  Prostate cancer (CMS/HCC)  Other orders  -     Follow Up In Primary Care - Established; Future

## 2024-02-23 NOTE — PATIENT INSTRUCTIONS
Increase miralax to 1/2 cap 2x/day    Take pedroza capsule 1 2x/day til moving bowels    Push fluids with calories; add boost or ensure

## 2024-02-24 ENCOUNTER — HOME CARE VISIT (OUTPATIENT)
Dept: HOME HEALTH SERVICES | Facility: HOME HEALTH | Age: 79
End: 2024-02-24
Payer: MEDICARE

## 2024-02-24 VITALS
SYSTOLIC BLOOD PRESSURE: 146 MMHG | HEART RATE: 68 BPM | RESPIRATION RATE: 18 BRPM | DIASTOLIC BLOOD PRESSURE: 68 MMHG | TEMPERATURE: 97.4 F

## 2024-02-24 PROCEDURE — 1090000001 HH PPS REVENUE CREDIT

## 2024-02-24 PROCEDURE — 1090000002 HH PPS REVENUE DEBIT

## 2024-02-24 PROCEDURE — G0151 HHCP-SERV OF PT,EA 15 MIN: HCPCS | Mod: HHH

## 2024-02-24 SDOH — HEALTH STABILITY: PHYSICAL HEALTH: EXERCISE TYPE: LE THER EX

## 2024-02-24 ASSESSMENT — ACTIVITIES OF DAILY LIVING (ADL)
AMBULATION ASSISTANCE: STAND BY ASSIST
OASIS_M1830: 03
DRESSING_UB_CURRENT_FUNCTION: STAND BY ASSIST
PHYSICAL TRANSFERS ASSESSED: 1
AMBULATION ASSISTANCE ON FLAT SURFACES: 1
AMBULATION ASSISTANCE: 1
CURRENT_FUNCTION: STAND BY ASSIST
ENTERING_EXITING_HOME: NEEDS ASSISTANCE
DRESSING_LB_CURRENT_FUNCTION: MINIMUM ASSIST

## 2024-02-24 ASSESSMENT — ENCOUNTER SYMPTOMS
SUBJECTIVE PAIN PROGRESSION: UNCHANGED
PAIN LOCATION: LEFT KNEE
HIGHEST PAIN SEVERITY IN PAST 24 HOURS: 9/10
HYPERTENSION: 1
LIMITED RANGE OF MOTION: 1
PERSON REPORTING PAIN: PATIENT
MUSCLE WEAKNESS: 1
PAIN SEVERITY GOAL: 3/10
OCCASIONAL FEELINGS OF UNSTEADINESS: 0
PAIN LOCATION - PAIN SEVERITY: 7/10
PAIN: 1

## 2024-02-25 PROCEDURE — 1090000001 HH PPS REVENUE CREDIT

## 2024-02-25 PROCEDURE — 1090000002 HH PPS REVENUE DEBIT

## 2024-02-26 ENCOUNTER — HOME CARE VISIT (OUTPATIENT)
Dept: HOME HEALTH SERVICES | Facility: HOME HEALTH | Age: 79
End: 2024-02-26
Payer: MEDICARE

## 2024-02-26 ENCOUNTER — LAB (OUTPATIENT)
Dept: LAB | Facility: LAB | Age: 79
End: 2024-02-26
Payer: MEDICARE

## 2024-02-26 VITALS
SYSTOLIC BLOOD PRESSURE: 134 MMHG | HEART RATE: 86 BPM | DIASTOLIC BLOOD PRESSURE: 70 MMHG | TEMPERATURE: 97.5 F | OXYGEN SATURATION: 96 %

## 2024-02-26 VITALS
TEMPERATURE: 97.6 F | HEART RATE: 79 BPM | RESPIRATION RATE: 16 BRPM | DIASTOLIC BLOOD PRESSURE: 64 MMHG | SYSTOLIC BLOOD PRESSURE: 116 MMHG | OXYGEN SATURATION: 95 %

## 2024-02-26 DIAGNOSIS — N17.9 ACUTE RENAL FAILURE, UNSPECIFIED ACUTE RENAL FAILURE TYPE (CMS-HCC): ICD-10-CM

## 2024-02-26 LAB
ANION GAP SERPL CALC-SCNC: 15 MMOL/L (ref 10–20)
BUN SERPL-MCNC: 29 MG/DL (ref 6–23)
CALCIUM SERPL-MCNC: 9 MG/DL (ref 8.6–10.3)
CHLORIDE SERPL-SCNC: 105 MMOL/L (ref 98–107)
CO2 SERPL-SCNC: 23 MMOL/L (ref 21–32)
CREAT SERPL-MCNC: 1.4 MG/DL (ref 0.5–1.3)
EGFRCR SERPLBLD CKD-EPI 2021: 50 ML/MIN/1.73M*2
GLUCOSE SERPL-MCNC: 96 MG/DL (ref 74–99)
POTASSIUM SERPL-SCNC: 5.1 MMOL/L (ref 3.5–5.3)
SODIUM SERPL-SCNC: 138 MMOL/L (ref 136–145)

## 2024-02-26 PROCEDURE — 1090000002 HH PPS REVENUE DEBIT

## 2024-02-26 PROCEDURE — G0299 HHS/HOSPICE OF RN EA 15 MIN: HCPCS | Mod: HHH

## 2024-02-26 PROCEDURE — 80048 BASIC METABOLIC PNL TOTAL CA: CPT

## 2024-02-26 PROCEDURE — 36415 COLL VENOUS BLD VENIPUNCTURE: CPT

## 2024-02-26 PROCEDURE — G0151 HHCP-SERV OF PT,EA 15 MIN: HCPCS | Mod: HHH

## 2024-02-26 PROCEDURE — 1090000001 HH PPS REVENUE CREDIT

## 2024-02-26 SDOH — HEALTH STABILITY: PHYSICAL HEALTH: EXERCISE TYPE: LE THER EX

## 2024-02-26 ASSESSMENT — ENCOUNTER SYMPTOMS
PAIN SEVERITY GOAL: 0/10
PAIN LOCATION - PAIN SEVERITY: 4/10
PAIN LOCATION - PAIN FREQUENCY: CONSTANT
PERSON REPORTING PAIN: PATIENT
PAIN LOCATION - PAIN DURATION: ONGOING
PAIN: 1
PERSON REPORTING PAIN: PATIENT
LIMITED RANGE OF MOTION: 1
LOWEST PAIN SEVERITY IN PAST 24 HOURS: 4/10
PAIN LOCATION - PAIN SEVERITY: 6/10
SUBJECTIVE PAIN PROGRESSION: UNCHANGED
MUSCLE WEAKNESS: 1
PAIN LOCATION: LEFT KNEE
MUSCLE WEAKNESS: 1
LOWEST PAIN SEVERITY IN PAST 24 HOURS: 5/10
LAST BOWEL MOVEMENT: 66896
PAIN: 1
PAIN LOCATION - RELIEVING FACTORS: PAIN MEDICATION
CHANGE IN APPETITE: UNCHANGED
PAIN LOCATION: LEFT KNEE
APPETITE LEVEL: POOR
HIGHEST PAIN SEVERITY IN PAST 24 HOURS: 9/10
HIGHEST PAIN SEVERITY IN PAST 24 HOURS: 7/10

## 2024-02-26 ASSESSMENT — ACTIVITIES OF DAILY LIVING (ADL)
PHYSICAL TRANSFERS ASSESSED: 1
AMBULATION ASSISTANCE ON FLAT SURFACES: 1
AMBULATION ASSISTANCE: STAND BY ASSIST
AMBULATION_DISTANCE/DURATION_TOLERATED: 100 FT
AMBULATION ASSISTANCE: 1
CURRENT_FUNCTION: STAND BY ASSIST

## 2024-02-27 ENCOUNTER — HOME CARE VISIT (OUTPATIENT)
Dept: HOME HEALTH SERVICES | Facility: HOME HEALTH | Age: 79
End: 2024-02-27
Payer: MEDICARE

## 2024-02-27 VITALS
HEART RATE: 96 BPM | SYSTOLIC BLOOD PRESSURE: 116 MMHG | DIASTOLIC BLOOD PRESSURE: 64 MMHG | RESPIRATION RATE: 18 BRPM | OXYGEN SATURATION: 98 % | TEMPERATURE: 96.6 F

## 2024-02-27 DIAGNOSIS — R89.9 ABNORMAL LABORATORY TEST RESULT: ICD-10-CM

## 2024-02-27 PROCEDURE — 1090000002 HH PPS REVENUE DEBIT

## 2024-02-27 PROCEDURE — 1090000001 HH PPS REVENUE CREDIT

## 2024-02-27 PROCEDURE — G0152 HHCP-SERV OF OT,EA 15 MIN: HCPCS | Mod: HHH

## 2024-02-27 ASSESSMENT — ACTIVITIES OF DAILY LIVING (ADL)
FEEDING_WITHIN_DEFINED_LIMITS: 1
BATHING_CURRENT_FUNCTION: INDEPENDENT
WASHING_LB_CURRENT_FUNCTION: INDEPENDENT
BATHING ASSESSED: 1
TOILETING: INDEPENDENT
DRESSING_LB_CURRENT_FUNCTION: INDEPENDENT
GROOMING_WITHIN_DEFINED_LIMITS: 1
TOILETING: 1

## 2024-02-27 ASSESSMENT — ENCOUNTER SYMPTOMS
PERSON REPORTING PAIN: PATIENT
LOSS OF SENSATION IN FEET: 0
PAIN: 1
OCCASIONAL FEELINGS OF UNSTEADINESS: 1
DEPRESSION: 0
SUBJECTIVE PAIN PROGRESSION: GRADUALLY IMPROVING
PAIN SEVERITY GOAL: 0/10
HIGHEST PAIN SEVERITY IN PAST 24 HOURS: 6/10
AGGRESSION WITHIN DEFINED LIMITS: 1
ANGER WITHIN DEFINED LIMITS: 1
LOWEST PAIN SEVERITY IN PAST 24 HOURS: 2/10

## 2024-02-27 ASSESSMENT — PAIN SCALES - PAIN ASSESSMENT IN ADVANCED DEMENTIA (PAINAD)
CONSOLABILITY: 0 - NO NEED TO CONSOLE.
BODYLANGUAGE: 0
TOTALSCORE: 0
CONSOLABILITY: 0
NEGVOCALIZATION: 0 - NONE.
FACIALEXPRESSION: 0 - SMILING OR INEXPRESSIVE.
FACIALEXPRESSION: 0
BREATHING: 0
NEGVOCALIZATION: 0
BODYLANGUAGE: 0 - RELAXED.

## 2024-02-28 ENCOUNTER — HOME CARE VISIT (OUTPATIENT)
Dept: HOME HEALTH SERVICES | Facility: HOME HEALTH | Age: 79
End: 2024-02-28
Payer: MEDICARE

## 2024-02-28 VITALS
HEART RATE: 94 BPM | SYSTOLIC BLOOD PRESSURE: 130 MMHG | TEMPERATURE: 97.9 F | DIASTOLIC BLOOD PRESSURE: 78 MMHG | RESPIRATION RATE: 18 BRPM

## 2024-02-28 DIAGNOSIS — Z96.652 S/P TOTAL KNEE ARTHROPLASTY, LEFT: Primary | ICD-10-CM

## 2024-02-28 PROCEDURE — G0151 HHCP-SERV OF PT,EA 15 MIN: HCPCS | Mod: HHH

## 2024-02-28 PROCEDURE — 1090000002 HH PPS REVENUE DEBIT

## 2024-02-28 PROCEDURE — 1090000001 HH PPS REVENUE CREDIT

## 2024-02-28 RX ORDER — TRAMADOL HYDROCHLORIDE 50 MG/1
50 TABLET ORAL EVERY 8 HOURS PRN
Qty: 15 TABLET | Refills: 0 | Status: SHIPPED | OUTPATIENT
Start: 2024-02-28 | End: 2024-03-08 | Stop reason: ALTCHOICE

## 2024-02-28 SDOH — HEALTH STABILITY: PHYSICAL HEALTH: EXERCISE TYPE: LE THER EX

## 2024-02-28 ASSESSMENT — ACTIVITIES OF DAILY LIVING (ADL)
AMBULATION ASSISTANCE ON FLAT SURFACES: 1
CURRENT_FUNCTION: SUPERVISION
AMBULATION_DISTANCE/DURATION_TOLERATED: 150 FT
PHYSICAL TRANSFERS ASSESSED: 1
AMBULATION ASSISTANCE: SUPERVISION
AMBULATION ASSISTANCE: 1

## 2024-02-28 ASSESSMENT — ENCOUNTER SYMPTOMS
PAIN: 1
LIMITED RANGE OF MOTION: 1
PAIN LOCATION: LEFT KNEE
PAIN LOCATION - PAIN SEVERITY: 6/10
MUSCLE WEAKNESS: 1
PERSON REPORTING PAIN: PATIENT

## 2024-02-29 PROCEDURE — 1090000001 HH PPS REVENUE CREDIT

## 2024-02-29 PROCEDURE — 1090000002 HH PPS REVENUE DEBIT

## 2024-03-01 PROCEDURE — 1090000002 HH PPS REVENUE DEBIT

## 2024-03-01 PROCEDURE — 1090000001 HH PPS REVENUE CREDIT

## 2024-03-02 PROCEDURE — 1090000001 HH PPS REVENUE CREDIT

## 2024-03-02 PROCEDURE — 1090000002 HH PPS REVENUE DEBIT

## 2024-03-03 PROCEDURE — 1090000001 HH PPS REVENUE CREDIT

## 2024-03-03 PROCEDURE — 1090000002 HH PPS REVENUE DEBIT

## 2024-03-04 ENCOUNTER — APPOINTMENT (OUTPATIENT)
Dept: PHYSICAL THERAPY | Facility: CLINIC | Age: 79
End: 2024-03-04
Payer: MEDICARE

## 2024-03-04 ENCOUNTER — HOME CARE VISIT (OUTPATIENT)
Dept: HOME HEALTH SERVICES | Facility: HOME HEALTH | Age: 79
End: 2024-03-04
Payer: MEDICARE

## 2024-03-04 VITALS
DIASTOLIC BLOOD PRESSURE: 86 MMHG | SYSTOLIC BLOOD PRESSURE: 102 MMHG | RESPIRATION RATE: 18 BRPM | TEMPERATURE: 96.5 F | HEART RATE: 92 BPM

## 2024-03-04 PROCEDURE — G0151 HHCP-SERV OF PT,EA 15 MIN: HCPCS | Mod: HHH

## 2024-03-04 PROCEDURE — 1090000001 HH PPS REVENUE CREDIT

## 2024-03-04 PROCEDURE — 1090000002 HH PPS REVENUE DEBIT

## 2024-03-04 SDOH — HEALTH STABILITY: PHYSICAL HEALTH: EXERCISE TYPE: LE THER EX

## 2024-03-04 ASSESSMENT — ENCOUNTER SYMPTOMS
PERSON REPORTING PAIN: PATIENT
SUBJECTIVE PAIN PROGRESSION: GRADUALLY IMPROVING
PAIN: 1
PAIN LOCATION: LEFT KNEE
MUSCLE WEAKNESS: 1
PAIN LOCATION - PAIN SEVERITY: 3/10
LIMITED RANGE OF MOTION: 1

## 2024-03-04 ASSESSMENT — ACTIVITIES OF DAILY LIVING (ADL)
PHYSICAL TRANSFERS ASSESSED: 1
AMBULATION ASSISTANCE: INDEPENDENT
AMBULATION ASSISTANCE: 1
CURRENT_FUNCTION: INDEPENDENT
AMBULATION ASSISTANCE ON FLAT SURFACES: 1
AMBULATION_DISTANCE/DURATION_TOLERATED: 150 FT

## 2024-03-04 NOTE — Clinical Note
Patient reports having loss of appetite and a hard time getting food down for the past 3 weeks since his left knee replacement on 2/12. He is concerned about not being able to eat and his recovery from surgery.

## 2024-03-05 PROCEDURE — 1090000001 HH PPS REVENUE CREDIT

## 2024-03-05 PROCEDURE — 1090000002 HH PPS REVENUE DEBIT

## 2024-03-06 ENCOUNTER — HOME CARE VISIT (OUTPATIENT)
Dept: HOME HEALTH SERVICES | Facility: HOME HEALTH | Age: 79
End: 2024-03-06
Payer: MEDICARE

## 2024-03-06 VITALS
HEART RATE: 84 BPM | RESPIRATION RATE: 18 BRPM | DIASTOLIC BLOOD PRESSURE: 76 MMHG | SYSTOLIC BLOOD PRESSURE: 120 MMHG | OXYGEN SATURATION: 98 % | TEMPERATURE: 97.1 F

## 2024-03-06 VITALS
HEART RATE: 92 BPM | DIASTOLIC BLOOD PRESSURE: 72 MMHG | RESPIRATION RATE: 18 BRPM | SYSTOLIC BLOOD PRESSURE: 110 MMHG | TEMPERATURE: 97.3 F | OXYGEN SATURATION: 99 %

## 2024-03-06 PROCEDURE — G0300 HHS/HOSPICE OF LPN EA 15 MIN: HCPCS | Mod: HHH

## 2024-03-06 PROCEDURE — G0151 HHCP-SERV OF PT,EA 15 MIN: HCPCS | Mod: HHH

## 2024-03-06 PROCEDURE — 1090000001 HH PPS REVENUE CREDIT

## 2024-03-06 PROCEDURE — 1090000002 HH PPS REVENUE DEBIT

## 2024-03-06 SDOH — HEALTH STABILITY: PHYSICAL HEALTH: EXERCISE TYPE: LE THER EX

## 2024-03-06 ASSESSMENT — ENCOUNTER SYMPTOMS
LIMITED RANGE OF MOTION: 1
PAIN: 1
PAIN LOCATION - PAIN SEVERITY: 3/10
SUBJECTIVE PAIN PROGRESSION: GRADUALLY IMPROVING
PERSON REPORTING PAIN: PATIENT
MUSCLE WEAKNESS: 1
PAIN LOCATION: LEFT KNEE

## 2024-03-06 ASSESSMENT — ACTIVITIES OF DAILY LIVING (ADL)
AMBULATION ASSISTANCE ON FLAT SURFACES: 1
AMBULATION_DISTANCE/DURATION_TOLERATED: 150 FT
AMBULATION ASSISTANCE: 1
CURRENT_FUNCTION: SUPERVISION
PHYSICAL TRANSFERS ASSESSED: 1
AMBULATION ASSISTANCE: SUPERVISION

## 2024-03-06 NOTE — CASE COMMUNICATION
Patient discussing different nutritional supplements with home health nurse. Agreed to televisit this Friday. Televisit scheduled.   
none

## 2024-03-07 PROCEDURE — 1090000001 HH PPS REVENUE CREDIT

## 2024-03-07 PROCEDURE — 1090000002 HH PPS REVENUE DEBIT

## 2024-03-07 SDOH — ECONOMIC STABILITY: GENERAL

## 2024-03-07 ASSESSMENT — PAIN SCALES - PAIN ASSESSMENT IN ADVANCED DEMENTIA (PAINAD)
NEGVOCALIZATION: 0
TOTALSCORE: 0
NEGVOCALIZATION: 0 - NONE.
FACIALEXPRESSION: 0 - SMILING OR INEXPRESSIVE.
CONSOLABILITY: 0 - NO NEED TO CONSOLE.
BREATHING: 0
FACIALEXPRESSION: 0
BODYLANGUAGE: 0 - RELAXED.
BODYLANGUAGE: 0
CONSOLABILITY: 0

## 2024-03-07 ASSESSMENT — ENCOUNTER SYMPTOMS
PAIN: 1
STOOL FREQUENCY: LESS THAN DAILY
PERSON REPORTING PAIN: PATIENT
CHANGE IN APPETITE: VARYING
APPETITE LEVEL: FAIR
BOWEL PATTERN NORMAL: 1

## 2024-03-07 ASSESSMENT — ACTIVITIES OF DAILY LIVING (ADL): MONEY MANAGEMENT (EXPENSES/BILLS): INDEPENDENT

## 2024-03-08 ENCOUNTER — TELEMEDICINE (OUTPATIENT)
Dept: PRIMARY CARE | Facility: CLINIC | Age: 79
End: 2024-03-08
Payer: MEDICARE

## 2024-03-08 ENCOUNTER — LAB (OUTPATIENT)
Dept: LAB | Facility: LAB | Age: 79
End: 2024-03-08
Payer: MEDICARE

## 2024-03-08 DIAGNOSIS — R63.0 LACK OF APPETITE: Primary | ICD-10-CM

## 2024-03-08 DIAGNOSIS — N17.9 ACUTE RENAL FAILURE, UNSPECIFIED ACUTE RENAL FAILURE TYPE (CMS-HCC): ICD-10-CM

## 2024-03-08 DIAGNOSIS — R89.9 ABNORMAL LABORATORY TEST RESULT: ICD-10-CM

## 2024-03-08 LAB
ANION GAP SERPL CALC-SCNC: 14 MMOL/L (ref 10–20)
BUN SERPL-MCNC: 34 MG/DL (ref 6–23)
CALCIUM SERPL-MCNC: 9.9 MG/DL (ref 8.6–10.3)
CHLORIDE SERPL-SCNC: 104 MMOL/L (ref 98–107)
CO2 SERPL-SCNC: 25 MMOL/L (ref 21–32)
CREAT SERPL-MCNC: 1.68 MG/DL (ref 0.5–1.3)
EGFRCR SERPLBLD CKD-EPI 2021: 41 ML/MIN/1.73M*2
GLUCOSE SERPL-MCNC: 129 MG/DL (ref 74–99)
POTASSIUM SERPL-SCNC: 4.5 MMOL/L (ref 3.5–5.3)
SODIUM SERPL-SCNC: 138 MMOL/L (ref 136–145)

## 2024-03-08 PROCEDURE — 1111F DSCHRG MED/CURRENT MED MERGE: CPT | Performed by: INTERNAL MEDICINE

## 2024-03-08 PROCEDURE — 1090000002 HH PPS REVENUE DEBIT

## 2024-03-08 PROCEDURE — 36415 COLL VENOUS BLD VENIPUNCTURE: CPT

## 2024-03-08 PROCEDURE — 1036F TOBACCO NON-USER: CPT | Performed by: INTERNAL MEDICINE

## 2024-03-08 PROCEDURE — 1160F RVW MEDS BY RX/DR IN RCRD: CPT | Performed by: INTERNAL MEDICINE

## 2024-03-08 PROCEDURE — 99442 PR PHYS/QHP TELEPHONE EVALUATION 11-20 MIN: CPT | Performed by: INTERNAL MEDICINE

## 2024-03-08 PROCEDURE — 1125F AMNT PAIN NOTED PAIN PRSNT: CPT | Performed by: INTERNAL MEDICINE

## 2024-03-08 PROCEDURE — 1090000001 HH PPS REVENUE CREDIT

## 2024-03-08 PROCEDURE — 1159F MED LIST DOCD IN RCRD: CPT | Performed by: INTERNAL MEDICINE

## 2024-03-08 ASSESSMENT — ENCOUNTER SYMPTOMS
LOSS OF SENSATION IN FEET: 0
OCCASIONAL FEELINGS OF UNSTEADINESS: 0
DEPRESSION: 0

## 2024-03-08 ASSESSMENT — PAIN SCALES - GENERAL: PAINLEVEL: 5

## 2024-03-08 NOTE — PROGRESS NOTES
Subjective   Patient ID: Avery Huff is a 79 y.o. male who presents for No chief complaint on file..    Can't eat and weight down to 169. Yesterday and day before able to eat a little bit. Makes something that sound good but when sits in front of it just can't get it down.  Has no appetite but thinks it is slightly better. Not drinking much either. Denies nausea or vomiting. Has mild constipation but no abdominal pain. Now off narcotics completely. Is occas dizzy--will try cutting metoprolol and lisinopril in half to decrease dizziness. Extensive discussion of stomach shrunk at this point and need to slowly retrain it to eat. Suggest eating something small every 1-2 hrs-don't worry about a full meal at this time.  Is very upset about how bad this surgery and post op has gone and how bad Ester has become due to it. Doesn't want meds for depressions-thinks this will improve when he does. Advise he go have the labs drawn to monitor his kidneys          Virtual or Telephone Consent    A telephone visit (audio only) between the patient (at the originating site) and the provider (at the distant site) was utilized to provide this telehealth service.   Verbal consent was requested and obtained from Avery Huff on this date, 03/08/24 for a telehealth visit.  Total time 13min         Review of Systems    Objective   There were no vitals taken for this visit.    Physical Exam    Assessment/Plan   Diagnoses and all orders for this visit:  Lack of appetite  Acute renal failure, unspecified acute renal failure type (CMS/[facility])

## 2024-03-09 PROCEDURE — 1090000002 HH PPS REVENUE DEBIT

## 2024-03-09 PROCEDURE — 1090000001 HH PPS REVENUE CREDIT

## 2024-03-10 PROCEDURE — 1090000001 HH PPS REVENUE CREDIT

## 2024-03-10 PROCEDURE — 1090000002 HH PPS REVENUE DEBIT

## 2024-03-10 NOTE — RESULT ENCOUNTER NOTE
Labs shows worsening dehydration and kidney function deteriorating--recheck this week with his efforts to drik more

## 2024-03-11 ENCOUNTER — HOME CARE VISIT (OUTPATIENT)
Dept: HOME HEALTH SERVICES | Facility: HOME HEALTH | Age: 79
End: 2024-03-11
Payer: MEDICARE

## 2024-03-11 VITALS
DIASTOLIC BLOOD PRESSURE: 78 MMHG | RESPIRATION RATE: 18 BRPM | TEMPERATURE: 97.3 F | SYSTOLIC BLOOD PRESSURE: 124 MMHG | HEART RATE: 80 BPM

## 2024-03-11 DIAGNOSIS — R89.9 ABNORMAL LABORATORY TEST RESULT: ICD-10-CM

## 2024-03-11 PROCEDURE — 1090000002 HH PPS REVENUE DEBIT

## 2024-03-11 PROCEDURE — 1090000001 HH PPS REVENUE CREDIT

## 2024-03-11 PROCEDURE — G0151 HHCP-SERV OF PT,EA 15 MIN: HCPCS | Mod: HHH

## 2024-03-11 SDOH — HEALTH STABILITY: PHYSICAL HEALTH: EXERCISE TYPE: LE THER EX

## 2024-03-11 ASSESSMENT — ACTIVITIES OF DAILY LIVING (ADL)
PHYSICAL TRANSFERS ASSESSED: 1
CURRENT_FUNCTION: INDEPENDENT
AMBULATION_DISTANCE/DURATION_TOLERATED: 150 FT
AMBULATION ASSISTANCE: INDEPENDENT
AMBULATION ASSISTANCE ON FLAT SURFACES: 1
AMBULATION ASSISTANCE: 1

## 2024-03-11 ASSESSMENT — ENCOUNTER SYMPTOMS
PAIN LOCATION - PAIN SEVERITY: 2/10
MUSCLE WEAKNESS: 1
LIMITED RANGE OF MOTION: 1
PAIN: 1
PAIN LOCATION: LEFT KNEE
PERSON REPORTING PAIN: PATIENT
SUBJECTIVE PAIN PROGRESSION: GRADUALLY IMPROVING

## 2024-03-12 ENCOUNTER — HOME CARE VISIT (OUTPATIENT)
Dept: HOME HEALTH SERVICES | Facility: HOME HEALTH | Age: 79
End: 2024-03-12
Payer: MEDICARE

## 2024-03-12 VITALS
TEMPERATURE: 97.7 F | WEIGHT: 170 LBS | HEART RATE: 78 BPM | OXYGEN SATURATION: 96 % | RESPIRATION RATE: 16 BRPM | DIASTOLIC BLOOD PRESSURE: 60 MMHG | SYSTOLIC BLOOD PRESSURE: 118 MMHG | BODY MASS INDEX: 24.39 KG/M2

## 2024-03-12 PROCEDURE — 1090000002 HH PPS REVENUE DEBIT

## 2024-03-12 PROCEDURE — G0299 HHS/HOSPICE OF RN EA 15 MIN: HCPCS | Mod: HHH

## 2024-03-12 PROCEDURE — 1090000001 HH PPS REVENUE CREDIT

## 2024-03-12 SDOH — ECONOMIC STABILITY: FOOD INSECURITY: MEALS PER DAY: 3

## 2024-03-12 ASSESSMENT — PAIN SCALES - PAIN ASSESSMENT IN ADVANCED DEMENTIA (PAINAD)
NEGVOCALIZATION: 0 - NONE.
CONSOLABILITY: 0
BREATHING: 0
FACIALEXPRESSION: 0 - SMILING OR INEXPRESSIVE.
BODYLANGUAGE: 0
CONSOLABILITY: 0 - NO NEED TO CONSOLE.
BODYLANGUAGE: 0 - RELAXED.
FACIALEXPRESSION: 0
NEGVOCALIZATION: 0
TOTALSCORE: 0

## 2024-03-12 ASSESSMENT — ENCOUNTER SYMPTOMS
PAIN SEVERITY GOAL: 0/10
HIGHEST PAIN SEVERITY IN PAST 24 HOURS: 3/10
LOWEST PAIN SEVERITY IN PAST 24 HOURS: 1/10
PAIN LOCATION - PAIN SEVERITY: 3/10
PAIN LOCATION: LEFT KNEE
PAIN LOCATION - PAIN DURATION: DAILY
PAIN: 1
PAIN LOCATION - EXACERBATING FACTORS: AMBULATION
APPETITE LEVEL: FAIR
PERSON REPORTING PAIN: PATIENT
SUBJECTIVE PAIN PROGRESSION: WAXING AND WANING
PAIN LOCATION - PAIN QUALITY: ACHE
PAIN LOCATION - RELIEVING FACTORS: REST, ELEVATION
PAIN LOCATION - PAIN FREQUENCY: INTERMITTENT
CHANGE IN APPETITE: INCREASED

## 2024-03-13 ENCOUNTER — HOME CARE VISIT (OUTPATIENT)
Dept: HOME HEALTH SERVICES | Facility: HOME HEALTH | Age: 79
End: 2024-03-13
Payer: MEDICARE

## 2024-03-13 VITALS
TEMPERATURE: 97.5 F | DIASTOLIC BLOOD PRESSURE: 64 MMHG | SYSTOLIC BLOOD PRESSURE: 124 MMHG | RESPIRATION RATE: 18 BRPM | HEART RATE: 74 BPM

## 2024-03-13 PROCEDURE — G0151 HHCP-SERV OF PT,EA 15 MIN: HCPCS | Mod: HHH

## 2024-03-13 PROCEDURE — 1090000002 HH PPS REVENUE DEBIT

## 2024-03-13 PROCEDURE — 1090000001 HH PPS REVENUE CREDIT

## 2024-03-13 SDOH — HEALTH STABILITY: PHYSICAL HEALTH: EXERCISE TYPE: LE THER EX

## 2024-03-13 ASSESSMENT — ENCOUNTER SYMPTOMS
LOWEST PAIN SEVERITY IN PAST 24 HOURS: 2/10
PAIN SEVERITY GOAL: 0/10
PAIN: 1
PAIN LOCATION - PAIN SEVERITY: 2/10
HIGHEST PAIN SEVERITY IN PAST 24 HOURS: 3/10
SUBJECTIVE PAIN PROGRESSION: GRADUALLY IMPROVING
LIMITED RANGE OF MOTION: 1
MUSCLE WEAKNESS: 1
PAIN LOCATION: LEFT KNEE
PERSON REPORTING PAIN: PATIENT

## 2024-03-13 ASSESSMENT — ACTIVITIES OF DAILY LIVING (ADL)
PHYSICAL TRANSFERS ASSESSED: 1
HOME_HEALTH_OASIS: 00
OASIS_M1830: 01
AMBULATION ASSISTANCE: INDEPENDENT
CURRENT_FUNCTION: INDEPENDENT
AMBULATION ASSISTANCE: 1
AMBULATION ASSISTANCE ON FLAT SURFACES: 1
AMBULATION_DISTANCE/DURATION_TOLERATED: 150 FT

## 2024-03-14 ENCOUNTER — LAB (OUTPATIENT)
Dept: LAB | Facility: LAB | Age: 79
End: 2024-03-14
Payer: MEDICARE

## 2024-03-14 DIAGNOSIS — R89.9 ABNORMAL LABORATORY TEST RESULT: ICD-10-CM

## 2024-03-14 LAB
ANION GAP SERPL CALC-SCNC: 11 MMOL/L (ref 10–20)
BUN SERPL-MCNC: 24 MG/DL (ref 6–23)
CALCIUM SERPL-MCNC: 9.6 MG/DL (ref 8.6–10.3)
CHLORIDE SERPL-SCNC: 105 MMOL/L (ref 98–107)
CO2 SERPL-SCNC: 27 MMOL/L (ref 21–32)
CREAT SERPL-MCNC: 1.41 MG/DL (ref 0.5–1.3)
EGFRCR SERPLBLD CKD-EPI 2021: 50 ML/MIN/1.73M*2
GLUCOSE SERPL-MCNC: 90 MG/DL (ref 74–99)
POTASSIUM SERPL-SCNC: 4.8 MMOL/L (ref 3.5–5.3)
SODIUM SERPL-SCNC: 138 MMOL/L (ref 136–145)

## 2024-03-14 PROCEDURE — 36415 COLL VENOUS BLD VENIPUNCTURE: CPT

## 2024-03-15 DIAGNOSIS — R89.9 ABNORMAL LABORATORY TEST RESULT: ICD-10-CM

## 2024-03-17 NOTE — PROGRESS NOTES
"Physical Therapy Examination and Treatment Note    Patient Name: Avery Huff  MRN Number: [id number]  Initial Examination Date: 3/17/24  Referring Provider: Dr. Joe  Evaluating Clinician: ARTUR Trejo PT    Today's Date: 3/18/2024       INSURANCE:   Visit Number: 1  [place]ved Visits: [place]  Insurance Info: MEDICARE A/B - NO AUTH / [place] / $0 USED 2024 PT/. [facility] SUPP ACTIVE   CMS Certification Period/POC: 3/18/24 to 6/18/24    PRECAUTIONS/SPECIAL CONCERNS/RELEVANT PMHX: Left TKR complicated by acute renal failure and delirium, CAD, CHF, prostate CA, old R TKR    PT CLINICAL PROBLEM: L TKR 2/12/24 at [facility]    PROBLEM LIST: Chief Dx code:   1. S/P total knee arthroplasty, left        2. Unilateral primary osteoarthritis, left knee  Referral to Physical Therapy          SUBJECTIVE: Left TKR chief complaint \"soreness\" lower leg knee and ankle. Using a walker today. Had home PT. Lives with wife. Drove self to PT today. Has been able to go grocery shopping. Difficulties include: post op return 1 week hospital stay, kidney failure, lost 25Lbs, some transient mental state changes, now \"cold all the time\". His goals: More rom in my knee, \"get it stronger\" \"going up and down stairs is a pain\". Trying to be careful... \"I think I get around pretty good\". Played golf prior to surgery. Average symptoms 3/10. Doing some exercises at home. Does not own a cane. Multilevel home does stairs one at a time. 5 weeks post op today.     TREATMENT:  Symptoms/NPRS before Rx: 3  Symptoms/NPRS after Rx: 3  Timed Codes: (# minutes per modality and 0 if omitted)  TE:  10   NMRE-ED:     Gait:      Manual:      Stim:     Traction:     Access Code: [email or web address]  URL: [email or web address]  Date: 03/18/2024  Prepared by: Don Trejo    Exercises  - Supine Heel Slide  - 1-3 x daily - 1 sets - 10 reps  - Supine Heel Slide Knee Bending ROM/Flexibility with a Strap  - 1-3 x daily - 5 reps - 30 hold  - Seated " Hamstring Stretch  - 1-3 x daily - 3-5 reps - 30 hold  - Seated Knee Extension AROM Progression to Ankle Weight  - 1-3 x daily - 10-20 reps - 5 hold    Gait train with right cane and did all exercises above and given hep hand outs.        OBJECTIVE:  Date: 3/18/24  OUTCOME TOOL: KOOS 12/28 42% impaired  Rom -5 to 108  Quad 4-  Ham 4-  Gait min unsteady min antalgia able to walk on levels without assistive device doing steps one at a time  No signs infection, min swelling healed closed incision  No deformity  Full ankle painfree mobility      ASSESSMENT: 5 weeks post op Left TKR with some complications in recovery due to renal and mental status change and had to be re-admitted and lost 25 lbs. Now doing better but in general less active and more frail since surgery and mild to mod impairments and good prognosis.      Patient presents with low tissue reactivity,  low condition irritability, and low subject reactivity with impairments noted below and decreased level of functional abilities and would benefit from skilled PT to address limitations and achieve goals noted below.     PLAN: Left TKR program, mild to moderate progressions, rom, light bike as/when able, gait train and safety with cane, stair train/curbs, gradual progressions restore safe functional mobility, don't provoke pain.      Patient/parent/caregiver agreed and consented to plan of care for skilled physical therapy at 2 times per week for 8 weeks. Physical Therapy to include modalities prn as indicated, therapeutic exercise, manual therapy, neuromuscular re-education, gait and functional performance training, instruction and practice in a home exercise program (HEP) and self-management skills.     CARE PLAN/GOALS: (met, progressing, not progressing)    STG's: (weeks 4  / visits   )  1 rom 0-115  2 able to walk on levels with cane 200 feet 2 times and functionally steady    LTG's: (weeks  8 /visits   )  1 rom 0-120  2 able to walk stairs reciprocally  3  The Global Rating of Change Score (GROC) will improve to 5/7 =  “a good deal better” or more.   4 Improve functional outcome tool score (FOTS: EDER, NDI, ASES, ABC, etc.) by > 10 points for Minimally Important Clinical Difference (MICD).  5 Patient/client will be independent with a HEP and self-management skills to further enhance recovery and progress.  6 Patient/client will verbalize >75% symptom reduction for frequency and severity of symptoms and/or > two point reduction of VAS/NPRS.  7 The Patient Specific Functional Scale metric (PSFS) will improve from baseline value to greater than 80% functional.

## 2024-03-18 ENCOUNTER — EVALUATION (OUTPATIENT)
Dept: PHYSICAL THERAPY | Facility: CLINIC | Age: 79
End: 2024-03-18
Payer: MEDICARE

## 2024-03-18 DIAGNOSIS — Z96.652 S/P TOTAL KNEE ARTHROPLASTY, LEFT: Primary | ICD-10-CM

## 2024-03-18 DIAGNOSIS — M17.12 UNILATERAL PRIMARY OSTEOARTHRITIS, LEFT KNEE: ICD-10-CM

## 2024-03-18 PROCEDURE — 97161 PT EVAL LOW COMPLEX 20 MIN: CPT | Mod: GP

## 2024-03-18 PROCEDURE — 97110 THERAPEUTIC EXERCISES: CPT | Mod: GP

## 2024-03-20 ENCOUNTER — OFFICE VISIT (OUTPATIENT)
Dept: PRIMARY CARE | Facility: CLINIC | Age: 79
End: 2024-03-20
Payer: MEDICARE

## 2024-03-20 VITALS
BODY MASS INDEX: 25.17 KG/M2 | HEART RATE: 76 BPM | SYSTOLIC BLOOD PRESSURE: 108 MMHG | WEIGHT: 175.4 LBS | DIASTOLIC BLOOD PRESSURE: 62 MMHG | OXYGEN SATURATION: 99 %

## 2024-03-20 DIAGNOSIS — R63.0 LACK OF APPETITE: ICD-10-CM

## 2024-03-20 DIAGNOSIS — N17.9 ACUTE RENAL FAILURE, UNSPECIFIED ACUTE RENAL FAILURE TYPE (CMS-HCC): Primary | ICD-10-CM

## 2024-03-20 PROCEDURE — 1160F RVW MEDS BY RX/DR IN RCRD: CPT | Performed by: INTERNAL MEDICINE

## 2024-03-20 PROCEDURE — 99213 OFFICE O/P EST LOW 20 MIN: CPT | Performed by: INTERNAL MEDICINE

## 2024-03-20 PROCEDURE — 3078F DIAST BP <80 MM HG: CPT | Performed by: INTERNAL MEDICINE

## 2024-03-20 PROCEDURE — 1159F MED LIST DOCD IN RCRD: CPT | Performed by: INTERNAL MEDICINE

## 2024-03-20 PROCEDURE — 1125F AMNT PAIN NOTED PAIN PRSNT: CPT | Performed by: INTERNAL MEDICINE

## 2024-03-20 PROCEDURE — 1036F TOBACCO NON-USER: CPT | Performed by: INTERNAL MEDICINE

## 2024-03-20 PROCEDURE — 3074F SYST BP LT 130 MM HG: CPT | Performed by: INTERNAL MEDICINE

## 2024-03-20 ASSESSMENT — PATIENT HEALTH QUESTIONNAIRE - PHQ9
SUM OF ALL RESPONSES TO PHQ9 QUESTIONS 1 AND 2: 0
1. LITTLE INTEREST OR PLEASURE IN DOING THINGS: NOT AT ALL
2. FEELING DOWN, DEPRESSED OR HOPELESS: NOT AT ALL

## 2024-03-20 ASSESSMENT — ENCOUNTER SYMPTOMS
LOSS OF SENSATION IN FEET: 0
OCCASIONAL FEELINGS OF UNSTEADINESS: 0
DEPRESSION: 0

## 2024-03-20 ASSESSMENT — PAIN SCALES - GENERAL: PAINLEVEL: 3

## 2024-03-20 NOTE — PROGRESS NOTES
Subjective   Patient ID: Avery Huff is a 79 y.o. male who presents for FOLLOW UP .    Eating better but still a struggle-forces it and each day is a little better. Drinking way more. Has graduated to cane-able to go up and down stairs. Began outpatient therapy on Monday--working on stretching.  Sore by end of day but tolerable. Disorientation and hallucinations have resolved           Objective   /62 (BP Location: Left arm, Patient Position: Sitting)   Pulse 76   Wt 79.6 kg (175 lb 6.4 oz)   SpO2 99%   BMI 25.17 kg/m²     Physical Exam moving easier with cane    Assessment/Plan will continue at reduced dose of BP meds  Diagnoses and all orders for this visit:  Acute renal failure, unspecified acute renal failure type (CMS/HCC)  -     CBC and Auto Differential; Future  Lack of appetite  Other orders  -     Follow Up In Primary Care - Established; Future

## 2024-03-21 ENCOUNTER — TREATMENT (OUTPATIENT)
Dept: PHYSICAL THERAPY | Facility: CLINIC | Age: 79
End: 2024-03-21
Payer: MEDICARE

## 2024-03-21 DIAGNOSIS — Z96.652 S/P TOTAL KNEE ARTHROPLASTY, LEFT: ICD-10-CM

## 2024-03-21 DIAGNOSIS — M17.12 UNILATERAL PRIMARY OSTEOARTHRITIS, LEFT KNEE: ICD-10-CM

## 2024-03-21 PROCEDURE — 97110 THERAPEUTIC EXERCISES: CPT | Mod: GP

## 2024-03-21 NOTE — PROGRESS NOTES
"Physical Therapy Examination and Treatment Note    Patient Name: Avery Huff  MRN Number: [id number]  Initial Examination Date: 3/17/24  Referring Provider: Dr. Joe  Evaluating Clinician: ARTUR Trejo PT    Today's Date: 3/21/2024  Time Calculation  Start Time: 0755  Stop Time: 0845  Time Calculation (min): 50 min    INSURANCE:   Visit Number: 2  [place]ved Visits: [place]  Insurance Info: MEDICARE A/B - NO AUTH / [place] / $0 USED 2024 PT/[facility] SUPP ACTIVE   CMS Certification Period/POC: 3/18/24 to 6/18/24    PRECAUTIONS/SPECIAL CONCERNS/RELEVANT PMHX: Left TKR complicated by acute renal failure and delirium, CAD, CHF, prostate CA, old R TKR    PT CLINICAL PROBLEM: L TKR 2/12/24 at [facility]    PROBLEM LIST: Chief Dx code:   1. S/P total knee arthroplasty, left  Follow Up In Physical Therapy      2. Unilateral primary osteoarthritis, left knee  Follow Up In Physical Therapy          SUBJECTIVE: Left TKR chief complaint \"soreness\" lower leg knee and ankle. Using a walker today. Had home PT. Lives with wife. Drove self to PT today. Has been able to go grocery shopping. Difficulties include: post op return 1 week hospital stay, kidney failure, lost 25Lbs, some transient mental state changes, now \"cold all the time\". His goals: More rom in my knee, \"get it stronger\" \"going up and down stairs is a pain\". Trying to be careful... \"I think I get around pretty good\". Played golf prior to surgery. Average symptoms 3/10. Doing some exercises at home. Does not own a cane. Multilevel home does stairs one at a time. 5 weeks post op today.     3/21/24: Patient reports no new problems or concerns at this time.       TREATMENT:  Symptoms/NPRS before Rx: 3  Symptoms/NPRS after Rx: 3  Timed Codes: (# minutes per modality and 0 if omitted)  TE:     NMRE-ED:     Gait:      Manual:      Stim:     Traction:     Heel slides with strap 3 min warm up  QS 10x  Supine prop under ankle ext rom hold 2 min  Supine ext " OP by PT  Seated ext self stretch and ham stretch  Slant calf stretch  Heel raises at counter 2 x 10  Mini squat at counter 10x  Mini squat edge of raised plinthe 10x  Alternate marching in parallel bars light hand contact for support as needed  Bike seat 15 no resistance rocking to full revolutions 3 minutes      Access Code: [email or web address]  URL: [email or web address]  Date: 03/18/2024  Prepared by: Don Trejo    Exercises  - Supine Heel Slide  - 1-3 x daily - 1 sets - 10 reps  - Supine Heel Slide Knee Bending ROM/Flexibility with a Strap  - 1-3 x daily - 5 reps - 30 hold  - Seated Hamstring Stretch  - 1-3 x daily - 3-5 reps - 30 hold  - Seated Knee Extension AROM Progression to Ankle Weight  - 1-3 x daily - 10-20 reps - 5 hold    Gait train with right cane and did all exercises above and given hep hand outs.        OBJECTIVE:  Date: 3/18/24  OUTCOME TOOL: KOOS 12/28 42% impaired  Rom -5 to 108  Quad 4-  Ham 4-  Gait min unsteady min antalgia able to walk on levels without assistive device doing steps one at a time  No signs infection, min swelling healed closed incision  No deformity  Full ankle painfree mobility    3/21/24:  KF to 115 and improved extension from -10 to -5 with rx today.       ASSESSMENT: Very good session with improved knee rom and hep clarity. 5 weeks post op Left TKR with some complications in recovery due to renal and mental status change and had to be re-admitted and lost 25 lbs. Now doing better but in general less active and more frail since surgery and mild to mod impairments and good prognosis.      PLAN: focus on knee rom ext end ranges and flexion mobility. Left TKR program, mild to moderate progressions, rom, light bike as/when able, gait train and safety with cane, stair train/curbs, gradual progressions restore safe functional mobility, don't provoke pain.      Patient/parent/caregiver agreed and consented to plan of care for skilled physical therapy at 2 times per week for 8  weeks. Physical Therapy to include modalities prn as indicated, therapeutic exercise, manual therapy, neuromuscular re-education, gait and functional performance training, instruction and practice in a home exercise program (HEP) and self-management skills.     CARE PLAN/GOALS: (met, progressing, not progressing)    STG's: (weeks 4  / visits   )  1 rom 0-115  2 able to walk on levels with cane 200 feet 2 times and functionally steady    LTG's: (weeks  8 /visits   )  1 rom 0-120  2 able to walk stairs reciprocally  3 The Global Rating of Change Score (GROC) will improve to 5/7 =  “a good deal better” or more.   4 Improve functional outcome tool score (FOTS: EDER, NDI, ASES, ABC, etc.) by > 10 points for Minimally Important Clinical Difference (MICD).  5 Patient/client will be independent with a HEP and self-management skills to further enhance recovery and progress.  6 Patient/client will verbalize >75% symptom reduction for frequency and severity of symptoms and/or > two point reduction of VAS/NPRS.  7 The Patient Specific Functional Scale metric (PSFS) will improve from baseline value to greater than 80% functional.

## 2024-03-25 ENCOUNTER — TELEPHONE (OUTPATIENT)
Dept: PHYSICAL THERAPY | Facility: CLINIC | Age: 79
End: 2024-03-25
Payer: MEDICARE

## 2024-03-25 ENCOUNTER — APPOINTMENT (OUTPATIENT)
Dept: PHYSICAL THERAPY | Facility: CLINIC | Age: 79
End: 2024-03-25
Payer: MEDICARE

## 2024-03-25 NOTE — PROGRESS NOTES
Physical Therapy                 Therapy Communication Note    Patient Name: Avery Huff  MRN: [id number]  Today's Date: 3/25/2024     Discipline: Physical Therapy    Missed Visit Reason:      Missed Time: Cancel    Comment:

## 2024-03-27 ENCOUNTER — TELEPHONE (OUTPATIENT)
Dept: PRIMARY CARE | Facility: CLINIC | Age: 79
End: 2024-03-27

## 2024-03-27 ENCOUNTER — TREATMENT (OUTPATIENT)
Dept: PHYSICAL THERAPY | Facility: CLINIC | Age: 79
End: 2024-03-27
Payer: MEDICARE

## 2024-03-27 ENCOUNTER — LAB (OUTPATIENT)
Dept: LAB | Facility: LAB | Age: 79
End: 2024-03-27
Payer: MEDICARE

## 2024-03-27 DIAGNOSIS — N17.9 ACUTE RENAL FAILURE, UNSPECIFIED ACUTE RENAL FAILURE TYPE (CMS-HCC): ICD-10-CM

## 2024-03-27 DIAGNOSIS — Z96.652 S/P TOTAL KNEE ARTHROPLASTY, LEFT: ICD-10-CM

## 2024-03-27 DIAGNOSIS — M17.12 UNILATERAL PRIMARY OSTEOARTHRITIS, LEFT KNEE: ICD-10-CM

## 2024-03-27 DIAGNOSIS — R89.9 ABNORMAL LABORATORY TEST RESULT: ICD-10-CM

## 2024-03-27 LAB
ANION GAP SERPL CALC-SCNC: 12 MMOL/L (ref 10–20)
BASOPHILS # BLD AUTO: 0.03 X10*3/UL (ref 0–0.1)
BASOPHILS NFR BLD AUTO: 0.4 %
BUN SERPL-MCNC: 22 MG/DL (ref 6–23)
CALCIUM SERPL-MCNC: 9 MG/DL (ref 8.6–10.3)
CHLORIDE SERPL-SCNC: 110 MMOL/L (ref 98–107)
CO2 SERPL-SCNC: 23 MMOL/L (ref 21–32)
CREAT SERPL-MCNC: 1.17 MG/DL (ref 0.5–1.3)
EGFRCR SERPLBLD CKD-EPI 2021: 63 ML/MIN/1.73M*2
EOSINOPHIL # BLD AUTO: 0.21 X10*3/UL (ref 0–0.4)
EOSINOPHIL NFR BLD AUTO: 2.9 %
ERYTHROCYTE [DISTWIDTH] IN BLOOD BY AUTOMATED COUNT: 13.7 % (ref 11.5–14.5)
GLUCOSE SERPL-MCNC: 90 MG/DL (ref 74–99)
HCT VFR BLD AUTO: 33.4 % (ref 41–52)
HGB BLD-MCNC: 10.7 G/DL (ref 13.5–17.5)
IMM GRANULOCYTES # BLD AUTO: 0.02 X10*3/UL (ref 0–0.5)
IMM GRANULOCYTES NFR BLD AUTO: 0.3 % (ref 0–0.9)
LYMPHOCYTES # BLD AUTO: 1.37 X10*3/UL (ref 0.8–3)
LYMPHOCYTES NFR BLD AUTO: 18.7 %
MCH RBC QN AUTO: 32 PG (ref 26–34)
MCHC RBC AUTO-ENTMCNC: 32 G/DL (ref 32–36)
MCV RBC AUTO: 100 FL (ref 80–100)
MONOCYTES # BLD AUTO: 0.65 X10*3/UL (ref 0.05–0.8)
MONOCYTES NFR BLD AUTO: 8.9 %
NEUTROPHILS # BLD AUTO: 5.04 X10*3/UL (ref 1.6–5.5)
NEUTROPHILS NFR BLD AUTO: 68.8 %
NRBC BLD-RTO: 0 /100 WBCS (ref 0–0)
PLATELET # BLD AUTO: 265 X10*3/UL (ref 150–450)
POTASSIUM SERPL-SCNC: 4.4 MMOL/L (ref 3.5–5.3)
RBC # BLD AUTO: 3.34 X10*6/UL (ref 4.5–5.9)
SODIUM SERPL-SCNC: 141 MMOL/L (ref 136–145)
WBC # BLD AUTO: 7.3 X10*3/UL (ref 4.4–11.3)

## 2024-03-27 PROCEDURE — 97110 THERAPEUTIC EXERCISES: CPT | Mod: GP

## 2024-03-27 PROCEDURE — 36415 COLL VENOUS BLD VENIPUNCTURE: CPT

## 2024-03-27 NOTE — PROGRESS NOTES
"Physical Therapy Examination and Treatment Note    Patient Name: Avery Huff  MRN Number: [id number]  Initial Examination Date: 3/17/24  Referring Provider: Dr. Joe  Evaluating Clinician: ARTUR Trejo PT    Today's Date: 3/27/2024  Time Calculation  Start Time: 0830  Stop Time: 0915  Time Calculation (min): 45 min    INSURANCE:   Visit Number: 3  [place]ved Visits: [place]  Insurance Info: MEDICARE A/B - NO AUTH / [place] / $0 USED 2024 PT/Jarod [facility] SUPP ACTIVE   CMS Certification Period/POC: 3/18/24 to 6/18/24    PRECAUTIONS/SPECIAL CONCERNS/RELEVANT PMHX: Left TKR complicated by acute renal failure and delirium, CAD, CHF, prostate CA, old R TKR    PT CLINICAL PROBLEM: L TKR 2/12/24 at [facility] [place]    PROBLEM LIST: Chief Dx code:   1. S/P total knee arthroplasty, left  Follow Up In Physical Therapy      2. Unilateral primary osteoarthritis, left knee  Follow Up In Physical Therapy          SUBJECTIVE: Left TKR chief complaint \"soreness\" lower leg knee and ankle. Using a walker today. Had home PT. Lives with wife. Drove self to PT today. Has been able to go grocery shopping. Difficulties include: post op return 1 week hospital stay, kidney failure, lost 25Lbs, some transient mental state changes, now \"cold all the time\". His goals: More rom in my knee, \"get it stronger\" \"going up and down stairs is a pain\". Trying to be careful... \"I think I get around pretty good\". Played golf prior to surgery. Average symptoms 3/10. Doing some exercises at home. Does not own a cane. Multilevel home does stairs one at a time. 5 weeks post op today.     3/27/24: Saw the surgeon yesterday and \"he said it looked real good but I should try to get the knee a little straighter\"      TREATMENT:  Symptoms/NPRS before Rx: 3  Symptoms/NPRS after Rx: 3  Timed Codes: (# minutes per modality and 0 if omitted)  TE:     NMRE-ED:     Gait:      Manual:      Stim:     Traction:     Bike 5 min R3  Seated knee extension/hamstring " stretch 3 x 30  Seated foot prop on step stool 2 lbs saddle bag 3 min  Sit to stand squats chair plus pad 10 reps  FAQ 3 lbs 12 count hold 12 reps  Banded seated ham curls 2 x 10  Supine extension prop 2 min  Supine heel slides 5 x 40 seconds      Access Code: [email or web address]  URL: [email or web address]  Date: 03/18/2024  Prepared by: Don Trejo    Exercises  - Supine Heel Slide  - 1-3 x daily - 1 sets - 10 reps  - Supine Heel Slide Knee Bending ROM/Flexibility with a Strap  - 1-3 x daily - 5 reps - 30 hold  - Seated Hamstring Stretch  - 1-3 x daily - 3-5 reps - 30 hold  - Seated Knee Extension AROM Progression to Ankle Weight  - 1-3 x daily - 10-20 reps - 5 hold       OBJECTIVE:  Date: 3/18/24  OUTCOME TOOL: KOOS 12/28 42% impaired  Rom -5 to 108  Quad 4-  Ham 4-  Gait min unsteady min antalgia able to walk on levels without assistive device doing steps one at a time  No signs infection, min swelling healed closed incision  No deformity  Full ankle painfree mobility    3/21/24:  KF to 115 and improved extension from -10 to -5 with rx today.     3/27/24: KE -10     ASSESSMENT: Needs extension rom advised to stretch 3 day for this as above     PLAN: focus on knee rom ext end ranges and flexion mobility. Left TKR program, mild to moderate progressions, rom, light bike as/when able, gait train and safety with cane, stair train/curbs, gradual progressions restore safe functional mobility, don't provoke pain.      Patient/parent/caregiver agreed and consented to plan of care for skilled physical therapy at 2 times per week for 8 weeks. Physical Therapy to include modalities prn as indicated, therapeutic exercise, manual therapy, neuromuscular re-education, gait and functional performance training, instruction and practice in a home exercise program (HEP) and self-management skills.     CARE PLAN/GOALS: (met, progressing, not progressing)    STG's: (weeks 4  / visits   )  1 rom 0-115  2 able to walk on levels  with cane 200 feet 2 times and functionally steady    LTG's: (weeks  8 /visits   )  1 rom 0-120  2 able to walk stairs reciprocally  3 The Global Rating of Change Score (GROC) will improve to 5/7 =  “a good deal better” or more.   4 Improve functional outcome tool score (FOTS: EDER, NDI, ASES, ABC, etc.) by > 10 points for Minimally Important Clinical Difference (MICD).  5 Patient/client will be independent with a HEP and self-management skills to further enhance recovery and progress.  6 Patient/client will verbalize >75% symptom reduction for frequency and severity of symptoms and/or > two point reduction of VAS/NPRS.  7 The Patient Specific Functional Scale metric (PSFS) will improve from baseline value to greater than 80% functional.

## 2024-04-01 ENCOUNTER — TREATMENT (OUTPATIENT)
Dept: PHYSICAL THERAPY | Facility: CLINIC | Age: 79
End: 2024-04-01
Payer: MEDICARE

## 2024-04-01 DIAGNOSIS — M17.12 UNILATERAL PRIMARY OSTEOARTHRITIS, LEFT KNEE: ICD-10-CM

## 2024-04-01 DIAGNOSIS — Z96.652 S/P TOTAL KNEE ARTHROPLASTY, LEFT: ICD-10-CM

## 2024-04-01 PROCEDURE — 97110 THERAPEUTIC EXERCISES: CPT | Mod: GP

## 2024-04-01 NOTE — PROGRESS NOTES
"Physical Therapy Examination and Treatment Note    Patient Name: Avery Huff  MRN Number: [id number]  Initial Examination Date: 3/17/24  Referring Provider: Dr. Joe  Evaluating Clinician: ARTUR Trejo PT    Today's Date: 4/1/2024  Time Calculation  Start Time: 0915  Stop Time: 1000  Time Calculation (min): 45 min    INSURANCE:   Visit Number: 4  Approved Visits: [place]  Insurance Info: MEDICARE A/B - NO AUTH / [place]IS[place] / $0 USED 2024 PT/Jarod [facility] SUPP ACTIVE   CMS Certification Period/POC: 3/18/24 to 6/18/24    PRECAUTIONS/SPECIAL CONCERNS/RELEVANT PMHX: Left TKR complicated by acute renal failure and delirium, CAD, CHF, prostate CA, old R TKR    PT CLINICAL PROBLEM: L TKR 2/12/24 at [facility]    PROBLEM LIST: Chief Dx code:   1. S/P total knee arthroplasty, left  Follow Up In Physical Therapy      2. Unilateral primary osteoarthritis, left knee  Follow Up In Physical Therapy          SUBJECTIVE: Left TKR chief complaint \"soreness\" lower leg knee and ankle. Using a walker today. Had home PT. Lives with wife. Drove self to PT today. Has been able to go grocery shopping. Difficulties include: post op return 1 week hospital stay, kidney failure, lost 25Lbs, some transient mental state changes, now \"cold all the time\". His goals: More rom in my knee, \"get it stronger\" \"going up and down stairs is a pain\". Trying to be careful... \"I think I get around pretty good\". Played golf prior to surgery. Average symptoms 3/10. Doing some exercises at home. Does not own a cane. Multilevel home does stairs one at a time. 5 weeks post op today.     4/1/24:  Gets some discomfort in left knee when bending     TREATMENT:  Symptoms/NPRS before Rx: 2  Symptoms/NPRS after Rx: 1  Timed Codes: (# minutes per modality and 0 if omitted)  TE:  45   NMRE-ED:     Gait:      Manual:      Stim:     Traction:     Bike 5 min R3  Sit to stand squats 10 reps  FAQ 5 lbs 2 x 10  Seated ham curls OTB 3 x 10  Heel slide flexion rom "   Supine knee extension stretch FMR under ankle 3 min  SAQ 10 reps      Access Code: [email or web address]  URL: [email or web address]  Date: 03/18/2024  Prepared by: Don Trejo    Exercises  - Supine Heel Slide  - 1-3 x daily - 1 sets - 10 reps  - Supine Heel Slide Knee Bending ROM/Flexibility with a Strap  - 1-3 x daily - 5 reps - 30 hold  - Seated Hamstring Stretch  - 1-3 x daily - 3-5 reps - 30 hold  - Seated Knee Extension AROM Progression to Ankle Weight  - 1-3 x daily - 10-20 reps - 5 hold       OBJECTIVE:  Date: 3/18/24  OUTCOME TOOL: KOOS 12/28 42% impaired  Rom -5 to 108  Quad 4-  Ham 4-  Gait min unsteady min antalgia able to walk on levels without assistive device doing steps one at a time  No signs infection, min swelling healed closed incision  No deformity  Full ankle painfree mobility    3/21/24:  KF to 115 and improved extension from -10 to -5 with rx today.     3/27/24: KE -10     ASSESSMENT: Needs extension rom advised to stretch 3/day for this as above     PLAN: focus on knee rom ext end ranges and flexion mobility. Left TKR program, mild to moderate progressions, rom, light bike as/when able, gait train and safety with cane, stair train/curbs, gradual progressions restore safe functional mobility, don't provoke pain.      Patient/parent/caregiver agreed and consented to plan of care for skilled physical therapy at 2 times per week for 8 weeks. Physical Therapy to include modalities prn as indicated, therapeutic exercise, manual therapy, neuromuscular re-education, gait and functional performance training, instruction and practice in a home exercise program (HEP) and self-management skills.     CARE PLAN/GOALS: (met, progressing, not progressing)    STG's: (weeks 4  / visits   )  1 rom 0-115  2 able to walk on levels with cane 200 feet 2 times and functionally steady    LTG's: (weeks  8 /visits   )  1 rom 0-120  2 able to walk stairs reciprocally  3 The Global Rating of Change Score (GROC)  will improve to 5/7 =  “a good deal better” or more.   4 Improve functional outcome tool score (FOTS: EDER, NDI, ASES, ABC, etc.) by > 10 points for Minimally Important Clinical Difference (MICD).  5 Patient/client will be independent with a HEP and self-management skills to further enhance recovery and progress.  6 Patient/client will verbalize >75% symptom reduction for frequency and severity of symptoms and/or > two point reduction of VAS/NPRS.  7 The Patient Specific Functional Scale metric (PSFS) will improve from baseline value to greater than 80% functional.

## 2024-04-03 ENCOUNTER — TREATMENT (OUTPATIENT)
Dept: PHYSICAL THERAPY | Facility: CLINIC | Age: 79
End: 2024-04-03
Payer: MEDICARE

## 2024-04-03 DIAGNOSIS — M17.12 UNILATERAL PRIMARY OSTEOARTHRITIS, LEFT KNEE: ICD-10-CM

## 2024-04-03 DIAGNOSIS — Z96.652 S/P TOTAL KNEE ARTHROPLASTY, LEFT: ICD-10-CM

## 2024-04-03 PROCEDURE — 97110 THERAPEUTIC EXERCISES: CPT | Mod: GP

## 2024-04-03 NOTE — PROGRESS NOTES
"Physical Therapy Treatment Note    Patient Name: Avery Huff  MRN Number: [id number]  Initial Examination Date: 3/17/24  Referring Provider: Dr. Joe  Evaluating Clinician: ARTUR Trejo PT    Today's Date: 4/3/2024  Time Calculation  Start Time: 0830  Stop Time: 0915  Time Calculation (min): 45 min    INSURANCE:   Visit Number: 5  Approved Visits: [place]  Insurance Info: MEDICARE A/B - NO AUTH / [place] / $0 USED 2024 PT/STJarod [facility]  SUPP ACTIVE   CMS Certification Period/POC: 3/18/24 to 6/18/24    PRECAUTIONS/SPECIAL CONCERNS/RELEVANT PMHX: Left TKR complicated by acute renal failure and delirium, CAD, CHF, prostate CA, old R TKR    PT CLINICAL PROBLEM: L TKR 2/12/24 at [facility] [place]    PROBLEM LIST: Chief Dx code:   1. S/P total knee arthroplasty, left  Follow Up In Physical Therapy      2. Unilateral primary osteoarthritis, left knee  Follow Up In Physical Therapy          SUBJECTIVE: Left TKR chief complaint \"soreness\" lower leg knee and ankle. Using a walker today. Had home PT. Lives with wife. Drove self to PT today. Has been able to go grocery shopping. Difficulties include: post op return 1 week hospital stay, kidney failure, lost 25Lbs, some transient mental state changes, now \"cold all the time\". His goals: More rom in my knee, \"get it stronger\" \"going up and down stairs is a pain\". Trying to be careful... \"I think I get around pretty good\". Played golf prior to surgery. Average symptoms 3/10. Doing some exercises at home. Does not own a cane. Multilevel home does stairs one at a time. 5 weeks post op today.     4/3/24: did \"good\" after last session. \" I think I am walking a little better and the only time the knee bothers me is when I go to bed it has an ache\"     TREATMENT:  Symptoms/NPRS before Rx: 1  Symptoms/NPRS after Rx: 1  Timed Codes: (# minutes per modality and 0 if omitted)  TE:  45   NMRE-ED:     Gait:      Manual:      Stim:     Traction:     Bike 8 min R3  Sit to stand squats " 10 reps  Quantum leg extension 10 lbs 2 x 10 reps bilaterally  Seated ham curls 20 lbs 2 x 10 quantum  Sit to stand squats chair plus pad 10x  SLS alternating balance  Heel raises no support 2 x 10  Side to side weight shifts  Mini squat at mirror 10x  Stride stance mini lunge 10x      Access Code: [email or web address]  URL: [email or web address]  Date: 03/18/2024  Prepared by: Don Trejo    Exercises  - Supine Heel Slide  - 1-3 x daily - 1 sets - 10 reps  - Supine Heel Slide Knee Bending ROM/Flexibility with a Strap  - 1-3 x daily - 5 reps - 30 hold  - Seated Hamstring Stretch  - 1-3 x daily - 3-5 reps - 30 hold  - Seated Knee Extension AROM Progression to Ankle Weight  - 1-3 x daily - 10-20 reps - 5 hold       OBJECTIVE:  Date: 3/18/24  OUTCOME TOOL: KOOS 12/28 42% impaired  Rom -5 to 108  Quad 4-  Ham 4-  Gait min unsteady min antalgia able to walk on levels without assistive device doing steps one at a time  No signs infection, min swelling healed closed incision  No deformity  Full ankle painfree mobility    3/21/24:  KF to 115 and improved extension from -10 to -5 with rx today.     3/27/24: KE -10     ASSESSMENT: Needs extension rom advised to stretch 3/day for this as above     PLAN: focus on knee rom ext end ranges and flexion mobility. Left TKR program, mild to moderate progressions, rom, light bike as/when able, gait train and safety with cane, stair train/curbs, gradual progressions restore safe functional mobility, don't provoke pain.      Patient/parent/caregiver agreed and consented to plan of care for skilled physical therapy at 2 times per week for 8 weeks. Physical Therapy to include modalities prn as indicated, therapeutic exercise, manual therapy, neuromuscular re-education, gait and functional performance training, instruction and practice in a home exercise program (HEP) and self-management skills.     CARE PLAN/GOALS: (met, progressing, not progressing)    STG's: (weeks 4  / visits   )  1  rom 0-115  2 able to walk on levels with cane 200 feet 2 times and functionally steady    LTG's: (weeks  8 /visits   )  1 rom 0-120  2 able to walk stairs reciprocally  3 The Global Rating of Change Score (GROC) will improve to 5/7 =  “a good deal better” or more.   4 Improve functional outcome tool score (FOTS: EDER, NDI, ASES, ABC, etc.) by > 10 points for Minimally Important Clinical Difference (MICD).  5 Patient/client will be independent with a HEP and self-management skills to further enhance recovery and progress.  6 Patient/client will verbalize >75% symptom reduction for frequency and severity of symptoms and/or > two point reduction of VAS/NPRS.  7 The Patient Specific Functional Scale metric (PSFS) will improve from baseline value to greater than 80% functional.

## 2024-04-06 DIAGNOSIS — F51.01 PRIMARY INSOMNIA: ICD-10-CM

## 2024-04-08 ENCOUNTER — TREATMENT (OUTPATIENT)
Dept: PHYSICAL THERAPY | Facility: CLINIC | Age: 79
End: 2024-04-08
Payer: MEDICARE

## 2024-04-08 DIAGNOSIS — M17.12 UNILATERAL PRIMARY OSTEOARTHRITIS, LEFT KNEE: ICD-10-CM

## 2024-04-08 DIAGNOSIS — Z96.652 S/P TOTAL KNEE ARTHROPLASTY, LEFT: Primary | ICD-10-CM

## 2024-04-08 PROCEDURE — 97110 THERAPEUTIC EXERCISES: CPT | Mod: GP

## 2024-04-08 RX ORDER — ZOLPIDEM TARTRATE 10 MG/1
TABLET ORAL
Qty: 90 TABLET | Refills: 0 | Status: SHIPPED | OUTPATIENT
Start: 2024-04-08

## 2024-04-08 NOTE — PROGRESS NOTES
"Physical Therapy Treatment Note    Patient Name: Avery Huff  MRN Number: [id number]  Initial Examination Date: 3/17/24  Referring Provider: Dr. oJe  Evaluating Clinician: ARTUR Trejo PT    Today's Date: 4/8/2024  Time Calculation  Start Time: 0830  Stop Time: 0917  Time Calculation (min): 47 min    INSURANCE:   Visit Number: 6  Approved Visits: [place]  Insurance Info: MEDICARE A/B - NO AUTH / [place] / $0 US[facility]   CMS Certification Period/POC: 3/18/24 to 6/18/24    PRECAUTIONS/SPECIAL CONCERNS/RELEVANT PMHX: Left TKR complicated by acute renal failure and delirium, CAD, CHF, prostate CA, old R TKR    PT CLINICAL PROBLEM: L TKR 2/12/24 at [facility]    PROBLEM LIST: Chief Dx code:   1. S/P total knee arthroplasty, left  Follow Up In Physical Therapy      2. Unilateral primary osteoarthritis, left knee  Follow Up In Physical Therapy            SUBJECTIVE: Left TKR chief complaint \"soreness\" lower leg knee and ankle. Using a walker today. Had home PT. Lives with wife. Drove self to PT today. Has been able to go grocery shopping. Difficulties include: post op return 1 week hospital stay, kidney failure, lost 25Lbs, some transient mental state changes, now \"cold all the time\". His goals: More rom in my knee, \"get it stronger\" \"going up and down stairs is a pain\". Trying to be careful... \"I think I get around pretty good\". Played golf prior to surgery. Average symptoms 3/10. Doing some exercises at home. Does not own a cane. Multilevel home does stairs one at a time. 5 weeks post op today.     4/8/24 pretty busy Saturday around the house and then the knee was stiff that night but it is feeling good today.     TREATMENT:  Symptoms/NPRS before Rx: 1  Symptoms/NPRS after Rx: 1  Timed Codes: (# minutes per modality and 0 if omitted)  TE:  45   NMRE-ED:     Gait:      Manual:      Stim:     Traction:     Bike 8 min R3  Sit to stand squats 10 reps  Quantum leg extension 10 " lbs 2 x 10 reps bilaterally  FAQ 5 lbs 15 count hold 15 reps  Heel to toe tandem stance balance  Slow marching balance  Heel raises no UE support to task balance  Standing OKC knee flexion arom  6 inch step ups 10x    Access Code: [email or web address]  URL: [email or web address]  Date: 03/18/2024  Prepared by: Don Trejo    Exercises  - Supine Heel Slide  - 1-3 x daily - 1 sets - 10 reps  - Supine Heel Slide Knee Bending ROM/Flexibility with a Strap  - 1-3 x daily - 5 reps - 30 hold  - Seated Hamstring Stretch  - 1-3 x daily - 3-5 reps - 30 hold  - Seated Knee Extension AROM Progression to Ankle Weight  - 1-3 x daily - 10-20 reps - 5 hold       OBJECTIVE:  Date: 3/18/24  OUTCOME TOOL: KOOS 12/28 42% impaired  Rom -5 to 108  Quad 4-  Ham 4-  Gait min unsteady min antalgia able to walk on levels without assistive device doing steps one at a time  No signs infection, min swelling healed closed incision  No deformity  Full ankle painfree mobility  3/21/24:  KF to 115 and improved extension from -10 to -5 with rx today.   3/27/24: KE -10     ASSESSMENT: Needs functional strengthening and balance training     PLAN: focus on knee rom ext end ranges and flexion mobility. Left TKR program, mild to moderate progressions, rom, light bike as/when able, gait train and safety with cane, stair train/curbs, gradual progressions restore safe functional mobility, don't provoke pain.      Patient/parent/caregiver agreed and consented to plan of care for skilled physical therapy at 2 times per week for 8 weeks. Physical Therapy to include modalities prn as indicated, therapeutic exercise, manual therapy, neuromuscular re-education, gait and functional performance training, instruction and practice in a home exercise program (HEP) and self-management skills.     CARE PLAN/GOALS: (met, progressing, not progressing)    STG's: (weeks 4  / visits   )  1 rom 0-115  2 able to walk on levels with cane 200 feet 2 times and functionally  steady    LTG's: (weeks  8 /visits   )  1 rom 0-120  2 able to walk stairs reciprocally  3 The Global Rating of Change Score (GROC) will improve to 5/7 =  “a good deal better” or more.   4 Improve functional outcome tool score (FOTS: EDER, NDI, ASES, ABC, etc.) by > 10 points for Minimally Important Clinical Difference (MICD).  5 Patient/client will be independent with a HEP and self-management skills to further enhance recovery and progress.  6 Patient/client will verbalize >75% symptom reduction for frequency and severity of symptoms and/or > two point reduction of VAS/NPRS.  7 The Patient Specific Functional Scale metric (PSFS) will improve from baseline value to greater than 80% functional.

## 2024-04-10 ENCOUNTER — TREATMENT (OUTPATIENT)
Dept: PHYSICAL THERAPY | Facility: CLINIC | Age: 79
End: 2024-04-10
Payer: MEDICARE

## 2024-04-10 DIAGNOSIS — M17.12 UNILATERAL PRIMARY OSTEOARTHRITIS, LEFT KNEE: ICD-10-CM

## 2024-04-10 DIAGNOSIS — Z96.652 S/P TOTAL KNEE ARTHROPLASTY, LEFT: ICD-10-CM

## 2024-04-10 PROCEDURE — 97110 THERAPEUTIC EXERCISES: CPT | Mod: GP,KX

## 2024-04-10 NOTE — PROGRESS NOTES
"Physical Therapy Treatment Note    Patient Name: Avery Huff  MRN Number: [id number]  Initial Examination Date: 3/17/24  Referring Provider: Dr. Joe  Evaluating Clinician: ARTUR Trejo PT    Today's Date: 4/10/2024  Time Calculation  Start Time: 0830  Stop Time: 0915  Time Calculation (min): 45 min    INSURANCE:   Visit Number: 8  Approved Visits: [place]  Insurance Info: MEDICARE A/B - NO AUTH / [place] / $0 USED [facility]   CMS Certification Period/POC: 3/18/24 to 6/18/24    PRECAUTIONS/SPECIAL CONCERNS/RELEVANT PMHX: Left TKR complicated by acute renal failure and delirium, CAD, CHF, prostate CA, old R TKR    PT CLINICAL PROBLEM: L TKR 2/12/24 at [facility]    PROBLEM LIST: Chief Dx code:   1. S/P total knee arthroplasty, left  Follow Up In Physical Therapy      2. Unilateral primary osteoarthritis, left knee  Follow Up In Physical Therapy            SUBJECTIVE: Left TKR chief complaint \"soreness\" lower leg knee and ankle. Using a walker today. Had home PT. Lives with wife. Drove self to PT today. Has been able to go grocery shopping. Difficulties include: post op return 1 week hospital stay, kidney failure, lost 25Lbs, some transient mental state changes, now \"cold all the time\". His goals: More rom in my knee, \"get it stronger\" \"going up and down stairs is a pain\". Trying to be careful... \"I think I get around pretty good\". Played golf prior to surgery. Average symptoms 3/10. Doing some exercises at home. Does not own a cane. Multilevel home does stairs one at a time. 5 weeks post op today.     4/10/24 No longer using cane when indoors and feels safe moving about the house without it. Uses cane when outside. Prior to TKR did not use any devices and reports he was a steady walker.     TREATMENT:  Symptoms/NPRS before Rx: 1  Symptoms/NPRS after Rx: 1  Timed Codes: (# minutes per modality and 0 if omitted)  TE:  43   NMRE-ED:     Gait:      Manual:      Stim:     " Traction:     Bike 8 min R3  Sit to stand squats 10 reps  Gait 300 feet and 180 feet  Gait train for LLE clearance during swing phase  Darrel step overs  Airex pad balance  Stride stance  Forward and retro gait    Access Code: [email or web address]  URL: [email or web address]  Date: 03/18/2024  Prepared by: Don Trejo    Exercises  - Supine Heel Slide  - 1-3 x daily - 1 sets - 10 reps  - Supine Heel Slide Knee Bending ROM/Flexibility with a Strap  - 1-3 x daily - 5 reps - 30 hold  - Seated Hamstring Stretch  - 1-3 x daily - 3-5 reps - 30 hold  - Seated Knee Extension AROM Progression to Ankle Weight  - 1-3 x daily - 10-20 reps - 5 hold       OBJECTIVE:  Date: 3/18/24  OUTCOME TOOL: KOOS 12/28 42% impaired  Rom -5 to 108  Quad 4-  Ham 4-  Gait min unsteady min antalgia able to walk on levels without assistive device doing steps one at a time  No signs infection, min swelling healed closed incision  No deformity  Full ankle painfree mobility  3/21/24:  KF to 115 and improved extension from -10 to -5 with rx today.   3/27/24: KE -10     ASSESSMENT: Improved gait pattern less catching of left foot due to lacking swing phase flexion post rx     PLAN: focus on knee rom ext end ranges and flexion mobility. Left TKR program, mild to moderate progressions, rom, light bike as/when able, gait train and safety with cane, stair train/curbs, gradual progressions restore safe functional mobility, don't provoke pain.      Patient/parent/caregiver agreed and consented to plan of care for skilled physical therapy at 2 times per week for 8 weeks. Physical Therapy to include modalities prn as indicated, therapeutic exercise, manual therapy, neuromuscular re-education, gait and functional performance training, instruction and practice in a home exercise program (HEP) and self-management skills.     CARE PLAN/GOALS: (met, progressing, not progressing)    STG's: (weeks 4  / visits   )  1 rom 0-115  2 able to walk on levels with cane  200 feet 2 times and functionally steady    LTG's: (weeks  8 /visits   )  1 rom 0-120  2 able to walk stairs reciprocally  3 The Global Rating of Change Score (GROC) will improve to 5/7 =  “a good deal better” or more.   4 Improve functional outcome tool score (FOTS: EDER, NDI, ASES, ABC, etc.) by > 10 points for Minimally Important Clinical Difference (MICD).  5 Patient/client will be independent with a HEP and self-management skills to further enhance recovery and progress.  6 Patient/client will verbalize >75% symptom reduction for frequency and severity of symptoms and/or > two point reduction of VAS/NPRS.  7 The Patient Specific Functional Scale metric (PSFS) will improve from baseline value to greater than 80% functional.

## 2024-04-15 ENCOUNTER — TREATMENT (OUTPATIENT)
Dept: PHYSICAL THERAPY | Facility: CLINIC | Age: 79
End: 2024-04-15
Payer: MEDICARE

## 2024-04-15 DIAGNOSIS — Z96.652 S/P TOTAL KNEE ARTHROPLASTY, LEFT: ICD-10-CM

## 2024-04-15 DIAGNOSIS — M17.12 UNILATERAL PRIMARY OSTEOARTHRITIS, LEFT KNEE: ICD-10-CM

## 2024-04-15 PROCEDURE — 97110 THERAPEUTIC EXERCISES: CPT | Mod: GP,KX

## 2024-04-15 NOTE — PROGRESS NOTES
Physical Therapy Treatment Note    Patient Name: Avery Huff  MRN Number: [id number]  Initial Examination Date: 3/17/24  Referring Provider: Dr. Joe  Evaluating Clinician: ARTUR Trejo PT    Today's Date: 4/15/2024  Time Calculation  Start Time: 0915  Stop Time: 1000  Time Calculation (min): 45 min    INSURANCE:   Visit Number: 9  [place]ved Visits: [place]  Insurance Info: MEDICARE A/B - NO AUTH / [place] / $0 USED 2024 PT/[facility]. [facility]  SUPP ACTIVE   CMS Certification Period/POC: 3/18/24 to 6/18/24    PRECAUTIONS/SPECIAL CONCERNS/RELEVANT PMHX: Left TKR complicated by acute renal failure and delirium, CAD, CHF, prostate CA, old R TKR    PT CLINICAL PROBLEM: L TKR 2/12/24 at [facility]    PROBLEM LIST: Chief Dx code:   1. S/P total knee arthroplasty, left  Follow Up In Physical Therapy      2. Unilateral primary osteoarthritis, left knee  Follow Up In Physical Therapy          SUBJECTIVE:  4/15/24: doing well enjoying the warmer weather.     TREATMENT:  Symptoms/NPRS before Rx: 0  Symptoms/NPRS after Rx: 0  Timed Codes: (# minutes per modality and 0 if omitted)  TE:  43   NMRE-ED:     Gait:      Manual:      Stim:     Traction:     Bike 10 min R3  Total gym L12 squat 3 x 10  Seated FAQ 6 lbs 12 count hold 15 reps  Banded ham curls 3 x 10  Seated ham/knee extension stretch 5 x 30   Slantboard calf stretch  Left jurgen step overs      Access Code: [email or web address]  URL: [email or web address]  Date: 03/18/2024  Prepared by: Don Trejo    Exercises  - Supine Heel Slide  - 1-3 x daily - 1 sets - 10 reps  - Supine Heel Slide Knee Bending ROM/Flexibility with a Strap  - 1-3 x daily - 5 reps - 30 hold  - Seated Hamstring Stretch  - 1-3 x daily - 3-5 reps - 30 hold  - Seated Knee Extension AROM Progression to Ankle Weight  - 1-3 x daily - 10-20 reps - 5 hold      OBJECTIVE:  Date: 3/18/24  OUTCOME TOOL: KOOS 12/28 42% impaired  Rom -5 to 108  Quad 4-  Ham 4-  Gait min unsteady min antalgia able to walk on  levels without assistive device doing steps one at a time  No signs infection, min swelling healed closed incision  No deformity  Full ankle painfree mobility  3/21/24:  KF to 115 and improved extension from -10 to -5 with rx today.   3/27/24: KE -10     ASSESSMENT: improved LLE clearance in gait this date catrina. Post step overs     PLAN: focus on knee rom ext end ranges and flexion mobility. Left TKR program, mild to moderate progressions, rom, light bike as/when able, gait train and safety with cane, stair train/curbs, gradual progressions restore safe functional mobility, don't provoke pain.      Patient/parent/caregiver agreed and consented to plan of care for skilled physical therapy at 2 times per week for 8 weeks. Physical Therapy to include modalities prn as indicated, therapeutic exercise, manual therapy, neuromuscular re-education, gait and functional performance training, instruction and practice in a home exercise program (HEP) and self-management skills.     CARE PLAN/GOALS: (met, progressing, not progressing)    STG's: (weeks 4  / visits   )  1 rom 0-115  2 able to walk on levels with cane 200 feet 2 times and functionally steady    LTG's: (weeks  8 /visits   )  1 rom 0-120  2 able to walk stairs reciprocally  3 The Global Rating of Change Score (GROC) will improve to 5/7 =  “a good deal better” or more.   4 Improve functional outcome tool score (FOTS: EDER, NDI, ASES, ABC, etc.) by > 10 points for Minimally Important Clinical Difference (MICD).  5 Patient/client will be independent with a HEP and self-management skills to further enhance recovery and progress.  6 Patient/client will verbalize >75% symptom reduction for frequency and severity of symptoms and/or > two point reduction of VAS/NPRS.  7 The Patient Specific Functional Scale metric (PSFS) will improve from baseline value to greater than 80% functional.

## 2024-04-17 ENCOUNTER — TREATMENT (OUTPATIENT)
Dept: PHYSICAL THERAPY | Facility: CLINIC | Age: 79
End: 2024-04-17
Payer: MEDICARE

## 2024-04-17 DIAGNOSIS — Z96.652 S/P TOTAL KNEE ARTHROPLASTY, LEFT: ICD-10-CM

## 2024-04-17 DIAGNOSIS — M17.12 UNILATERAL PRIMARY OSTEOARTHRITIS, LEFT KNEE: ICD-10-CM

## 2024-04-17 PROCEDURE — 97110 THERAPEUTIC EXERCISES: CPT | Mod: GP,KX

## 2024-04-17 NOTE — PROGRESS NOTES
Physical Therapy Treatment Note    Patient Name: Avery Huff  MRN Number: [id number]  Initial Examination Date: 3/17/24  Referring Provider: Dr. Joe  Evaluating Clinician: ARTUR Trejo PT    Today's Date: 4/17/2024  Time Calculation  Start Time: 0830  Stop Time: 0915  Time Calculation (min): 45 min    INSURANCE:   Visit Number: 10  Approved Visits: [place]  Insurance Info: MEDICARE A/B - NO AUTH / [place]IS[place] / $0 USED 2024 PT/[facility]. [facility]  SUPP ACTIVE   CMS Certification Period/POC: 3/18/24 to 6/18/24    PRECAUTIONS/SPECIAL CONCERNS/RELEVANT PMHX: Left TKR complicated by acute renal failure and delirium, CAD, CHF, prostate CA, old R TKR    PT CLINICAL PROBLEM: L TKR 2/12/24 at [facility]    PROBLEM LIST: Chief Dx code:   1. S/P total knee arthroplasty, left  Follow Up In Physical Therapy      2. Unilateral primary osteoarthritis, left knee  Follow Up In Physical Therapy          SUBJECTIVE:  4/17/24: Would like to work on some knee bending (flexion) today    TREATMENT:  Symptoms/NPRS before Rx: 0  Symptoms/NPRS after Rx: 0  Timed Codes: (# minutes per modality and 0 if omitted)  TE:  43   NMRE-ED:     Gait:      Manual:        Bike 10 min R3  Total gym L19 2 x 10  8 inch box step ups 10x  Sit to stand squats 10x  Gait 300 feet on levels no devices  Heel slide knee flexion rom 7 x 40 sec  Active heel slides  SAQ 4 lbs 2 x 10    Access Code: [email or web address]  URL: [email or web address]  Date: 03/18/2024  Prepared by: Don Trejo    Exercises  - Supine Heel Slide  - 1-3 x daily - 1 sets - 10 reps  - Supine Heel Slide Knee Bending ROM/Flexibility with a Strap  - 1-3 x daily - 5 reps - 30 hold  - Seated Hamstring Stretch  - 1-3 x daily - 3-5 reps - 30 hold  - Seated Knee Extension AROM Progression to Ankle Weight  - 1-3 x daily - 10-20 reps - 5 hold      OBJECTIVE:  Date: 3/18/24  OUTCOME TOOL: KOOS 12/28 42% impaired  Rom -5 to 108  Quad 4-  Ham 4-  Gait min unsteady min antalgia able to walk on levels  without assistive device doing steps one at a time  No signs infection, min swelling healed closed incision  No deformity  Full ankle painfree mobility  3/21/24:  KF to 115 and improved extension from -10 to -5 with rx today.   3/27/24: KE -10   4/17/24  max effort      ASSESSMENT: Improved gait pattern when not using cane. With cane pt. Tends to lean to the right and limp more.      PLAN: focus on knee rom ext end ranges and flexion mobility. Left TKR program, mild to moderate progressions, rom, light bike as/when able, gait train and safety with cane, stair train/curbs, gradual progressions restore safe functional mobility, don't provoke pain.      Patient/parent/caregiver agreed and consented to plan of care for skilled physical therapy at 2 times per week for 8 weeks. Physical Therapy to include modalities prn as indicated, therapeutic exercise, manual therapy, neuromuscular re-education, gait and functional performance training, instruction and practice in a home exercise program (HEP) and self-management skills.     CARE PLAN/GOALS: (met, progressing, not progressing)    STG's: (weeks 4  / visits   )  1 rom 0-115  2 able to walk on levels with cane 200 feet 2 times and functionally steady    LTG's: (weeks  8 /visits   )  1 rom 0-120  2 able to walk stairs reciprocally  3 The Global Rating of Change Score (GROC) will improve to 5/7 =  “a good deal better” or more.   4 Improve functional outcome tool score (FOTS: EDER, NDI, ASES, ABC, etc.) by > 10 points for Minimally Important Clinical Difference (MICD).  5 Patient/client will be independent with a HEP and self-management skills to further enhance recovery and progress.  6 Patient/client will verbalize >75% symptom reduction for frequency and severity of symptoms and/or > two point reduction of VAS/NPRS.  7 The Patient Specific Functional Scale metric (PSFS) will improve from baseline value to greater than 80% functional.

## 2024-04-22 ENCOUNTER — TREATMENT (OUTPATIENT)
Dept: PHYSICAL THERAPY | Facility: CLINIC | Age: 79
End: 2024-04-22
Payer: MEDICARE

## 2024-04-22 DIAGNOSIS — Z96.652 S/P TOTAL KNEE ARTHROPLASTY, LEFT: ICD-10-CM

## 2024-04-22 DIAGNOSIS — M17.12 UNILATERAL PRIMARY OSTEOARTHRITIS, LEFT KNEE: ICD-10-CM

## 2024-04-22 PROCEDURE — 97110 THERAPEUTIC EXERCISES: CPT | Mod: GP,KX

## 2024-04-22 NOTE — PROGRESS NOTES
Physical Therapy Treatment Note    Patient Name: Avery Huff  MRN Number: [id number]  Initial Examination Date: 3/17/24  Referring Provider: Dr. oJe  Evaluating Clinician: ARTUR Trejo PT    Today's Date: 4/22/2024  Time Calculation  Start Time: 0830  Stop Time: 0915  Time Calculation (min): 45 min    INSURANCE:   Visit Number: 11  Approved Visits: [place]  Insurance Info: MEDICARE A/B - NO AUTH / [place] / $0 USED 2024 PT/STJarod EUBANKS  SUPP ACTIVE   CMS Certification Period/POC: 3/18/24 to 6/18/24    PRECAUTIONS/SPECIAL CONCERNS/RELEVANT PMHX: Left TKR complicated by acute renal failure and delirium, CAD, CHF, prostate CA, old R TKR    PT CLINICAL PROBLEM: L TKR 2/12/24 at [facility]    PROBLEM LIST: Chief Dx code:   1. S/P total knee arthroplasty, left  Follow Up In Physical Therapy      2. Unilateral primary osteoarthritis, left knee  Follow Up In Physical Therapy          SUBJECTIVE: Patient reports no new problems or concerns at this time.         TREATMENT:  Symptoms/NPRS before Rx: 0  Symptoms/NPRS after Rx: 0  Timed Codes: (# minutes per modality and 0 if omitted)  TE:  43   NMRE-ED:     Gait:      Manual:        Bike 10 min R3  Narrow stride stance balance 3 x 30 each  SLS balancing in parallel bars  Right jurgen step overs  Left 6 inch step ups 2 x 10  Sit to stand squats from chair 10x focus symmetric LE function  High marching   FAQ 7.5 lbs 2 x 10 reps        Access Code: [email or web address]  URL: [email or web address]  Date: 03/18/2024  Prepared by: Don Trejo    Exercises  - Supine Heel Slide  - 1-3 x daily - 1 sets - 10 reps  - Supine Heel Slide Knee Bending ROM/Flexibility with a Strap  - 1-3 x daily - 5 reps - 30 hold  - Seated Hamstring Stretch  - 1-3 x daily - 3-5 reps - 30 hold  - Seated Knee Extension AROM Progression to Ankle Weight  - 1-3 x daily - 10-20 reps - 5 hold      OBJECTIVE:  Date: 3/18/24  OUTCOME TOOL: KOOS 12/28 42% impaired  Rom -5 to 108  Quad 4-  Ham 4-  Gait  min unsteady min antalgia able to walk on levels without assistive device doing steps one at a time  No signs infection, min swelling healed closed incision  No deformity  Full ankle painfree mobility  3/21/24:  KF to 115 and improved extension from -10 to -5 with rx today.   3/27/24: KE -10   4/17/24  max effort      ASSESSMENT: Getting stronger and better able to balance and use legs symmetrically learning to re-train use of LLE     PLAN: focus on knee rom ext end ranges and flexion mobility. Left TKR program, mild to moderate progressions, rom, light bike as/when able, gait train and safety with cane, stair train/curbs, gradual progressions restore safe functional mobility, don't provoke pain.      Patient/parent/caregiver agreed and consented to plan of care for skilled physical therapy at 2 times per week for 8 weeks. Physical Therapy to include modalities prn as indicated, therapeutic exercise, manual therapy, neuromuscular re-education, gait and functional performance training, instruction and practice in a home exercise program (HEP) and self-management skills.     CARE PLAN/GOALS: (met, progressing, not progressing)    STG's: (weeks 4  / visits   )  1 rom 0-115  2 able to walk on levels with cane 200 feet 2 times and functionally steady    LTG's: (weeks  8 /visits   )  1 rom 0-120  2 able to walk stairs reciprocally  3 The Global Rating of Change Score (GROC) will improve to 5/7 =  “a good deal better” or more.   4 Improve functional outcome tool score (FOTS: EDER, NDI, ASES, ABC, etc.) by > 10 points for Minimally Important Clinical Difference (MICD).  5 Patient/client will be independent with a HEP and self-management skills to further enhance recovery and progress.  6 Patient/client will verbalize >75% symptom reduction for frequency and severity of symptoms and/or > two point reduction of VAS/NPRS.  7 The Patient Specific Functional Scale metric (PSFS) will improve from baseline value to  greater than 80% functional.

## 2024-04-25 ENCOUNTER — TREATMENT (OUTPATIENT)
Dept: PHYSICAL THERAPY | Facility: CLINIC | Age: 79
End: 2024-04-25
Payer: MEDICARE

## 2024-04-25 ENCOUNTER — LAB (OUTPATIENT)
Dept: LAB | Facility: LAB | Age: 79
End: 2024-04-25
Payer: MEDICARE

## 2024-04-25 DIAGNOSIS — Z96.652 S/P TOTAL KNEE ARTHROPLASTY, LEFT: Primary | ICD-10-CM

## 2024-04-25 DIAGNOSIS — M17.12 UNILATERAL PRIMARY OSTEOARTHRITIS, LEFT KNEE: ICD-10-CM

## 2024-04-25 DIAGNOSIS — N17.9 ACUTE RENAL FAILURE, UNSPECIFIED ACUTE RENAL FAILURE TYPE (CMS-HCC): ICD-10-CM

## 2024-04-25 LAB
ANION GAP SERPL CALC-SCNC: 11 MMOL/L (ref 10–20)
BASOPHILS # BLD AUTO: 0.02 X10*3/UL (ref 0–0.1)
BASOPHILS NFR BLD AUTO: 0.3 %
BUN SERPL-MCNC: 22 MG/DL (ref 6–23)
CALCIUM SERPL-MCNC: 9.3 MG/DL (ref 8.6–10.3)
CHLORIDE SERPL-SCNC: 108 MMOL/L (ref 98–107)
CO2 SERPL-SCNC: 26 MMOL/L (ref 21–32)
CREAT SERPL-MCNC: 1.13 MG/DL (ref 0.5–1.3)
EGFRCR SERPLBLD CKD-EPI 2021: 65 ML/MIN/1.73M*2
EOSINOPHIL # BLD AUTO: 0.2 X10*3/UL (ref 0–0.4)
EOSINOPHIL NFR BLD AUTO: 2.9 %
ERYTHROCYTE [DISTWIDTH] IN BLOOD BY AUTOMATED COUNT: 14.3 % (ref 11.5–14.5)
GLUCOSE SERPL-MCNC: 99 MG/DL (ref 74–99)
HCT VFR BLD AUTO: 37 % (ref 41–52)
HGB BLD-MCNC: 11.7 G/DL (ref 13.5–17.5)
IMM GRANULOCYTES # BLD AUTO: 0.02 X10*3/UL (ref 0–0.5)
IMM GRANULOCYTES NFR BLD AUTO: 0.3 % (ref 0–0.9)
LYMPHOCYTES # BLD AUTO: 1.51 X10*3/UL (ref 0.8–3)
LYMPHOCYTES NFR BLD AUTO: 21.7 %
MCH RBC QN AUTO: 32.3 PG (ref 26–34)
MCHC RBC AUTO-ENTMCNC: 31.6 G/DL (ref 32–36)
MCV RBC AUTO: 102 FL (ref 80–100)
MONOCYTES # BLD AUTO: 0.65 X10*3/UL (ref 0.05–0.8)
MONOCYTES NFR BLD AUTO: 9.3 %
NEUTROPHILS # BLD AUTO: 4.57 X10*3/UL (ref 1.6–5.5)
NEUTROPHILS NFR BLD AUTO: 65.5 %
NRBC BLD-RTO: 0 /100 WBCS (ref 0–0)
PLATELET # BLD AUTO: 246 X10*3/UL (ref 150–450)
POTASSIUM SERPL-SCNC: 4.3 MMOL/L (ref 3.5–5.3)
RBC # BLD AUTO: 3.62 X10*6/UL (ref 4.5–5.9)
SODIUM SERPL-SCNC: 141 MMOL/L (ref 136–145)
WBC # BLD AUTO: 7 X10*3/UL (ref 4.4–11.3)

## 2024-04-25 PROCEDURE — 97110 THERAPEUTIC EXERCISES: CPT | Mod: GP,KX

## 2024-04-25 PROCEDURE — 36415 COLL VENOUS BLD VENIPUNCTURE: CPT

## 2024-04-25 NOTE — PROGRESS NOTES
Physical Therapy Treatment Note    Patient Name: Avery Huff  MRN Number: [id number]  Initial Examination Date: 3/17/24  Referring Provider: Dr. Joe  Evaluating Clinician: ARTUR Trejo PT    Today's Date: 4/25/2024       INSURANCE:   Visit Number: 11  Approved Visits: [place]  Insurance Info: MEDICARE A/B - NO AUTH / [place] / $0 USED 2024 PT/ST. [employer]  SUPP ACTIVE   CMS Certification Period/POC: 3/18/24 to 6/18/24    PRECAUTIONS/SPECIAL CONCERNS/RELEVANT PMHX: Left TKR complicated by acute renal failure and delirium, CAD, CHF, prostate CA, old R TKR    PT CLINICAL PROBLEM: L TKR 2/12/24 at [facility]    PROBLEM LIST: Chief Dx code: M17.12 (ICD-10-CM) - Unilateral primary osteoarthritis, left knee   1. S/P total knee arthroplasty, left            SUBJECTIVE: Patient reports no new problems or concerns at this time.     TREATMENT:  Symptoms/NPRS before Rx: 0  Symptoms/NPRS after Rx: 0  Timed Codes: (# minutes per modality and 0 if omitted)  TE:  43   NMRE-ED:     Gait:      Manual:        Bike 10 min R3  4 inch LLE step downs for functional eccentric control with light UE assist 2 x 10 with stair progression training  SLS mini knee bend control on left  Total gym LLE only mini knee bend/control 2 x 10   Sit to stand squats with slow eccentric lowering to focus LLE control  FAQ 7.5 lbs 2 x 10  Saddlebag extension stretch 2 min 4 lbs     Access Code: [email or web address]  URL: [email or web address]  Date: 03/18/2024  Prepared by: Don Trejo    Exercises  - Supine Heel Slide  - 1-3 x daily - 1 sets - 10 reps  - Supine Heel Slide Knee Bending ROM/Flexibility with a Strap  - 1-3 x daily - 5 reps - 30 hold  - Seated Hamstring Stretch  - 1-3 x daily - 3-5 reps - 30 hold  - Seated Knee Extension AROM Progression to Ankle Weight  - 1-3 x daily - 10-20 reps - 5 hold      OBJECTIVE:  Date: 3/18/24  OUTCOME TOOL: KOOS 12/28 42% impaired  Rom -5 to 108  Quad 4-  Ham 4-  Gait min unsteady min antalgia able to  walk on levels without assistive device doing steps one at a time  No signs infection, min swelling healed closed incision  No deformity  Full ankle painfree mobility  3/21/24:  KF to 115 and improved extension from -10 to -5 with rx today.   3/27/24: KE -10   4/17/24  max effort    4/25/24 Lacks eccentric control on left quad and functional strength at 4-    ASSESSMENT: Continued weakness and habitual disuse favoring right univolved side functionally and needs additional skilled care.     PLAN: Eccentric functional control. Left TKR program, mild to moderate progressions, rom, light bike as/when able, gait train and safety with cane, stair train/curbs, gradual progressions restore safe functional mobility, don't provoke pain.      CARE PLAN/GOALS: (met, progressing, not progressing)    STG's: (weeks 4  / visits   )  1 rom 0-115  2 able to walk on levels with cane 200 feet 2 times and functionally steady    LTG's: (weeks  8 /visits   )  1 rom 0-120  2 able to walk stairs reciprocally  3 The Global Rating of Change Score (GROC) will improve to 5/7 =  “a good deal better” or more.   4 Improve functional outcome tool score (FOTS: EDER, NDI, ASES, ABC, etc.) by > 10 points for Minimally Important Clinical Difference (MICD).  5 Patient/client will be independent with a HEP and self-management skills to further enhance recovery and progress.  6 Patient/client will verbalize >75% symptom reduction for frequency and severity of symptoms and/or > two point reduction of VAS/NPRS.  7 The Patient Specific Functional Scale metric (PSFS) will improve from baseline value to greater than 80% functional.

## 2024-04-30 ENCOUNTER — OFFICE VISIT (OUTPATIENT)
Dept: PRIMARY CARE | Facility: CLINIC | Age: 79
End: 2024-04-30
Payer: MEDICARE

## 2024-04-30 VITALS
WEIGHT: 179 LBS | SYSTOLIC BLOOD PRESSURE: 140 MMHG | BODY MASS INDEX: 25.68 KG/M2 | OXYGEN SATURATION: 98 % | HEART RATE: 58 BPM | DIASTOLIC BLOOD PRESSURE: 70 MMHG

## 2024-04-30 DIAGNOSIS — N40.1 BENIGN NON-NODULAR PROSTATIC HYPERPLASIA WITH LOWER URINARY TRACT SYMPTOMS: ICD-10-CM

## 2024-04-30 DIAGNOSIS — I25.10 CORONARY ARTERIOSCLEROSIS: ICD-10-CM

## 2024-04-30 DIAGNOSIS — I50.42 CHRONIC COMBINED SYSTOLIC AND DIASTOLIC HEART FAILURE (MULTI): ICD-10-CM

## 2024-04-30 DIAGNOSIS — Z96.652 S/P TOTAL KNEE ARTHROPLASTY, LEFT: ICD-10-CM

## 2024-04-30 DIAGNOSIS — C61 PROSTATE CANCER (MULTI): ICD-10-CM

## 2024-04-30 DIAGNOSIS — I10 BENIGN ESSENTIAL HYPERTENSION: Primary | ICD-10-CM

## 2024-04-30 DIAGNOSIS — E78.2 MIXED HYPERLIPIDEMIA: ICD-10-CM

## 2024-04-30 PROCEDURE — 1125F AMNT PAIN NOTED PAIN PRSNT: CPT | Performed by: INTERNAL MEDICINE

## 2024-04-30 PROCEDURE — 1036F TOBACCO NON-USER: CPT | Performed by: INTERNAL MEDICINE

## 2024-04-30 PROCEDURE — 99214 OFFICE O/P EST MOD 30 MIN: CPT | Performed by: INTERNAL MEDICINE

## 2024-04-30 PROCEDURE — 3078F DIAST BP <80 MM HG: CPT | Performed by: INTERNAL MEDICINE

## 2024-04-30 PROCEDURE — 1157F ADVNC CARE PLAN IN RCRD: CPT | Performed by: INTERNAL MEDICINE

## 2024-04-30 PROCEDURE — 1159F MED LIST DOCD IN RCRD: CPT | Performed by: INTERNAL MEDICINE

## 2024-04-30 PROCEDURE — 3077F SYST BP >= 140 MM HG: CPT | Performed by: INTERNAL MEDICINE

## 2024-04-30 PROCEDURE — 1160F RVW MEDS BY RX/DR IN RCRD: CPT | Performed by: INTERNAL MEDICINE

## 2024-04-30 ASSESSMENT — ENCOUNTER SYMPTOMS
ABDOMINAL PAIN: 0
CONSTIPATION: 0
PALPITATIONS: 0
ABDOMINAL DISTENTION: 0
DIARRHEA: 0
COUGH: 0
VOMITING: 0
ARTHRALGIAS: 1
ACTIVITY CHANGE: 0
NAUSEA: 0
PSYCHIATRIC NEGATIVE: 1
SHORTNESS OF BREATH: 0
CONSTITUTIONAL NEGATIVE: 1
DYSURIA: 0

## 2024-04-30 ASSESSMENT — PAIN SCALES - GENERAL: PAINLEVEL: 4

## 2024-04-30 NOTE — PROGRESS NOTES
Subjective   Patient ID: Avery Huff is a 79 y.o. male who presents for Follow-up.    CAD with CHF-stable on meds-no NTG use-managed by Dr Edgar.     Hypertension-compliant and stable on current medications BP Readings from Last 3 Encounters:  04/30/24 : 140/70  03/20/24 : 108/62  03/13/24 : 124/64       ARF post knee replacement  last eGFR 65  4/2024---has resolved       Hyperlipidemia-stable and compliant on meds. Taking. Lab Results       Component                Value               Date                       LDLCALC                  56 (L)              01/31/2023          H/O prostate cancer-now with urinary issue and ED-on vesicare    Kidney stone-uric acid  . Taking allopurinol.   Lab Results       Component                Value               Date                       URICACID                 5.5                 01/31/2023          Chronic insomnia-relies on zolpidem to sleep    Exercise -just back to a little yard work, still going to therapy after knee replacement, hopes to golf       Diet  - eating nearly back to normal             Review of Systems   Constitutional: Negative.  Negative for activity change.        Still feeling v weak   Respiratory:  Negative for cough and shortness of breath.    Cardiovascular:  Positive for leg swelling (left only at end of day). Negative for chest pain and palpitations.   Gastrointestinal:  Negative for abdominal distention, abdominal pain, constipation (has resolved), diarrhea, nausea and vomiting.   Genitourinary:  Negative for dysuria.   Musculoskeletal:  Positive for arthralgias (catrina L knee).   Skin:  Negative for rash.   Psychiatric/Behavioral: Negative.         Objective   /70 (BP Location: Right arm, Patient Position: Sitting)   Pulse 58   Wt 81.2 kg (179 lb)   SpO2 98%   BMI 25.68 kg/m²     Physical Exam  Vitals reviewed.   Constitutional:       General: He is not in acute distress.     Appearance: Normal appearance.      Comments: Using cane     Cardiovascular:      Rate and Rhythm: Normal rate and regular rhythm.      Heart sounds: Normal heart sounds. No murmur heard.     No gallop.   Pulmonary:      Breath sounds: Normal breath sounds. No wheezing, rhonchi or rales.   Skin:     General: Skin is warm and dry.      Findings: No rash.      Comments: Early tan   Neurological:      General: No focal deficit present.      Mental Status: He is alert and oriented to person, place, and time.   Psychiatric:         Mood and Affect: Mood normal.         Assessment/Plan  will continue regimen as doing much better  Diagnoses and all orders for this visit:  Benign essential hypertension  Coronary arteriosclerosis  Chronic combined systolic and diastolic heart failure (Multi)  Mixed hyperlipidemia  Benign non-nodular prostatic hyperplasia with lower urinary tract symptoms  Prostate cancer (Multi)  S/P total knee arthroplasty, left  Other orders  -     Follow Up In Primary Care - Established  -     Follow Up In Primary Care - Established  -     Follow Up In Primary Care - Established; Future      Current Outpatient Medications:     acetaminophen (Tylenol) 325 mg tablet, Take 2 tablets (650 mg) by mouth every 4 hours if needed for mild pain (1 - 3)., Disp: 30 tablet, Rfl: 0    allopurinol (Zyloprim) 300 mg tablet, Take 1 tablet (300 mg) by mouth once daily., Disp: , Rfl:     atorvastatin (Lipitor) 20 mg tablet, Take 1 tablet (20 mg) by mouth once daily., Disp: , Rfl:     lisinopril 20 mg tablet, Take 0.5 tablets (10 mg) by mouth once daily., Disp: , Rfl:     metoprolol succinate XL (Toprol-XL) 25 mg 24 hr tablet, Take 0.5 tablets (12.5 mg) by mouth once daily., Disp: , Rfl:     nitroglycerin (Nitrostat) 0.4 mg SL tablet, Place 1 tablet (0.4 mg) under the tongue if needed for chest pain., Disp: , Rfl:     ondansetron ODT (Zofran-ODT) 4 mg disintegrating tablet, Take 1 tablet (4 mg) by mouth every 8 hours if needed for nausea., Disp: 20 tablet, Rfl: 0    solifenacin  (VESIcare) 10 mg tablet, Take 1 tablet (10 mg) by mouth once daily., Disp: , Rfl:     zolpidem (Ambien) 10 mg tablet, TAKE 1/2 TO 1 TABLET BY MOUTH  ONCE DAILY AT BEDTIME, Disp: 90 tablet, Rfl: 0

## 2024-05-02 ENCOUNTER — TREATMENT (OUTPATIENT)
Dept: PHYSICAL THERAPY | Facility: CLINIC | Age: 79
End: 2024-05-02
Payer: MEDICARE

## 2024-05-02 DIAGNOSIS — Z96.652 S/P TOTAL KNEE ARTHROPLASTY, LEFT: ICD-10-CM

## 2024-05-02 DIAGNOSIS — M17.12 UNILATERAL PRIMARY OSTEOARTHRITIS, LEFT KNEE: ICD-10-CM

## 2024-05-02 PROCEDURE — 97110 THERAPEUTIC EXERCISES: CPT | Mod: GP,KX

## 2024-05-02 NOTE — PROGRESS NOTES
Physical Therapy Treatment Note    Patient Name: Avery Huff  MRN Number: [id number]  Initial Examination Date: 3/17/24  Referring Provider: Dr. Joe  Evaluating Clinician: ARTUR Trejo PT    Today's Date: 5/2/2024  Time Calculation  Start Time: 0845  Stop Time: 0930  Time Calculation (min): 45 min    INSURANCE:   Visit Number: 12  Approved Visits: [place]  Insurance Info: MEDICARE A/B - NO AUTH / [place]IS[place] / $0 USED 2024 PT/STJarod EUBANKS  SUPP ACTIVE   CMS Certification Period/POC: 3/18/24 to 6/18/24    PRECAUTIONS/SPECIAL CONCERNS/RELEVANT PMHX: Left TKR complicated by acute renal failure and delirium, CAD, CHF, prostate CA, old R TKR    PT CLINICAL PROBLEM: L TKR 2/12/24 at [facility]    PROBLEM LIST: Chief Dx code: M17.12 (ICD-10-CM) - Unilateral primary osteoarthritis, left knee   1. S/P total knee arthroplasty, left  Follow Up In Physical Therapy      2. Unilateral primary osteoarthritis, left knee  Follow Up In Physical Therapy          SUBJECTIVE: states at the end of the day his ankle swells up but it is better the next day and is feeling good PCP advised compression sock. 11 weeks post op.     TREATMENT:  Symptoms/NPRS before Rx: 0  Symptoms/NPRS after Rx: 0  Timed Codes: (# minutes per modality and 0 if omitted)  TE:  43   NMRE-ED:     Gait:      Manual:        Bike 10 min R3  4 inch LLE step downs for functional eccentric control with light UE assist 2 x 10 with stair progression training  SLS mini knee bend control on left  Total gym LLE only mini knee bend/control 2 x 10   Total gym bilateral squat to 100 degrees  Sit to stand squats with slow eccentric lowering to focus LLE control  FAQ 7.5 lbs 2 x 10  Saddlebag extension stretch 2 min 4 lbs   4 inch step ups 10x     Access Code: [email or web address]  URL: [email or web address]  Date: 03/18/2024  Prepared by: Don Trejo    Exercises  - Supine Heel Slide  - 1-3 x daily - 1 sets - 10 reps  - Supine Heel Slide Knee Bending ROM/Flexibility  with a Strap  - 1-3 x daily - 5 reps - 30 hold  - Seated Hamstring Stretch  - 1-3 x daily - 3-5 reps - 30 hold  - Seated Knee Extension AROM Progression to Ankle Weight  - 1-3 x daily - 10-20 reps - 5 hold      OBJECTIVE:  Date: 3/18/24  OUTCOME TOOL: KOOS 12/28 42% impaired  Rom -5 to 108  Quad 4-  Ham 4-  Gait min unsteady min antalgia able to walk on levels without assistive device doing steps one at a time  No signs infection, min swelling healed closed incision  No deformity  Full ankle painfree mobility  3/21/24:  KF to 115 and improved extension from -10 to -5 with rx today.   3/27/24: KE -10   4/17/24  max effort    4/25/24 Lacks eccentric control on left quad and functional strength at 4-    ASSESSMENT:      PLAN: PT x 1 visit Eccentric functional control. Left TKR program, mild to moderate progressions, rom, light bike as/when able, gait train and safety with cane, stair train/curbs, gradual progressions restore safe functional mobility, don't provoke pain.      CARE PLAN/GOALS: (met, progressing, not progressing)    STG's: (weeks 4  / visits   )  1 rom 0-115  2 able to walk on levels with cane 200 feet 2 times and functionally steady    LTG's: (weeks  8 /visits   )  1 rom 0-120  2 able to walk stairs reciprocally  3 The Global Rating of Change Score (GROC) will improve to 5/7 =  “a good deal better” or more.   4 Improve functional outcome tool score (FOTS: EDER, NDI, ASES, ABC, etc.) by > 10 points for Minimally Important Clinical Difference (MICD).  5 Patient/client will be independent with a HEP and self-management skills to further enhance recovery and progress.  6 Patient/client will verbalize >75% symptom reduction for frequency and severity of symptoms and/or > two point reduction of VAS/NPRS.  7 The Patient Specific Functional Scale metric (PSFS) will improve from baseline value to greater than 80% functional.

## 2024-05-06 ENCOUNTER — TREATMENT (OUTPATIENT)
Dept: PHYSICAL THERAPY | Facility: CLINIC | Age: 79
End: 2024-05-06
Payer: MEDICARE

## 2024-05-06 DIAGNOSIS — M17.12 UNILATERAL PRIMARY OSTEOARTHRITIS, LEFT KNEE: ICD-10-CM

## 2024-05-06 DIAGNOSIS — Z96.652 S/P TOTAL KNEE ARTHROPLASTY, LEFT: ICD-10-CM

## 2024-05-06 PROCEDURE — 97110 THERAPEUTIC EXERCISES: CPT | Mod: GP,KX

## 2024-05-06 NOTE — PROGRESS NOTES
Physical Therapy Treatment and DC Note    Patient Name: Avery Huff  MRN Number: [id number]  Initial Examination Date: 3/17/24  Referring Provider: Dr. Joe  Evaluating Clinician: ARTUR Trejo PT    Today's Date: 5/6/2024  Time Calculation  Start Time: 0830  Stop Time: 0915  Time Calculation (min): 45 min    INSURANCE:   Visit Number: 13  Approved Visits: [place]  Insurance Info: MEDICARE A/B - NO AUTH / [place] VISITS / $0 USED 2024 PT/ST. [employer]  SUPP ACTIVE   CMS Certification Period/POC: 3/18/24 to 6/18/24    PRECAUTIONS/SPECIAL CONCERNS/RELEVANT PMHX: Left TKR complicated by acute renal failure and delirium, CAD, CHF, prostate CA, old R TKR    PT CLINICAL PROBLEM: L TKR 2/12/24 at [facility]    PROBLEM LIST: Chief Dx code: M17.12 (ICD-10-CM) - Unilateral primary osteoarthritis, left knee   1. S/P total knee arthroplasty, left  Follow Up In Physical Therapy      2. Unilateral primary osteoarthritis, left knee  Follow Up In Physical Therapy          SUBJECTIVE: No longer feels the need to use a cane.     TREATMENT:  Symptoms/NPRS before Rx: 0  Symptoms/NPRS after Rx: 0  Timed Codes: (# minutes per modality and 0 if omitted)  TE:  43   NMRE-ED:     Gait:      Manual:        Bike 10 min R4  Quantum FAQ 10 lbs 15 reps  Quantum ham curl bilateral 30 lbs 2 x 10  4 inch LLE step downs for functional eccentric control with light UE assist 2 x 10 with stair progression training  Left heel raises 2 x 5  SLS mini knee bend control on left  Unistance L and R 15 count 7 reps each  Total gym LLE only mini knee bend/control 2 x 10   Total gym bilateral squat to 100 degrees  Sit to stand squats with slow eccentric lowering to focus LLE control  FAQ 7.5 lbs 2 x 10  Saddlebag extension stretch 2 min 4 lbs   4 inch step ups 10x     Access Code: [email or web address]  URL: [email or web address]  Date: 03/18/2024  Prepared by: Don Trejo    Exercises  - Supine Heel Slide  - 1-3 x daily - 1 sets - 10 reps  - Supine Heel  Slide Knee Bending ROM/Flexibility with a Strap  - 1-3 x daily - 5 reps - 30 hold  - Seated Hamstring Stretch  - 1-3 x daily - 3-5 reps - 30 hold  - Seated Knee Extension AROM Progression to Ankle Weight  - 1-3 x daily - 10-20 reps - 5 hold      OBJECTIVE:  Date: 3/18/24  OUTCOME TOOL: KOOS 12/28 42% impaired  Rom -5 to 108  Quad 4-  Ham 4-  Gait min unsteady min antalgia able to walk on levels without assistive device doing steps one at a time  No signs infection, min swelling healed closed incision  No deformity  Full ankle painfree mobility  3/21/24:  KF to 115 and improved extension from -10 to -5 with rx today.   3/27/24: KE -10   4/17/24  max effort    4/25/24 Lacks eccentric control on left quad and functional strength at 4-    ASSESSMENT: Pt. Is ready to continue with his strengthening at home independently at this time and he agrees to DC and current plan     PLAN: DC     CARE PLAN/GOALS: (met, progressing, not progressing)    STG's: (weeks 4  / visits   )  1 rom 0-115  2 able to walk on levels with cane 200 feet 2 times and functionally steady    LTG's: (weeks  8 /visits   )  1 rom 0-120  2 able to walk stairs reciprocally  3 The Global Rating of Change Score (GROC) will improve to 5/7 =  “a good deal better” or more.   4 Improve functional outcome tool score (FOTS: EDER, NDI, ASES, ABC, etc.) by > 10 points for Minimally Important Clinical Difference (MICD).  5 Patient/client will be independent with a HEP and self-management skills to further enhance recovery and progress.  6 Patient/client will verbalize >75% symptom reduction for frequency and severity of symptoms and/or > two point reduction of VAS/NPRS.  7 The Patient Specific Functional Scale metric (PSFS) will improve from baseline value to greater than 80% functional.

## 2024-05-09 ENCOUNTER — APPOINTMENT (OUTPATIENT)
Dept: PHYSICAL THERAPY | Facility: CLINIC | Age: 79
End: 2024-05-09
Payer: MEDICARE

## 2024-05-30 DIAGNOSIS — I10 BENIGN ESSENTIAL HYPERTENSION: ICD-10-CM

## 2024-05-31 RX ORDER — METOPROLOL SUCCINATE 25 MG/1
25 TABLET, EXTENDED RELEASE ORAL DAILY
Qty: 90 TABLET | Refills: 3 | Status: SHIPPED | OUTPATIENT
Start: 2024-05-31

## 2024-06-07 DIAGNOSIS — E78.2 MIXED HYPERLIPIDEMIA: ICD-10-CM

## 2024-06-07 RX ORDER — ATORVASTATIN CALCIUM 20 MG/1
20 TABLET, FILM COATED ORAL DAILY
Qty: 90 TABLET | Refills: 3 | Status: SHIPPED | OUTPATIENT
Start: 2024-06-07

## 2024-07-09 DIAGNOSIS — N40.1 BENIGN NON-NODULAR PROSTATIC HYPERPLASIA WITH LOWER URINARY TRACT SYMPTOMS: Primary | ICD-10-CM

## 2024-07-09 RX ORDER — SOLIFENACIN SUCCINATE 10 MG/1
10 TABLET, FILM COATED ORAL DAILY
Qty: 90 TABLET | Refills: 0 | Status: SHIPPED | OUTPATIENT
Start: 2024-07-09

## 2024-07-11 ENCOUNTER — OFFICE VISIT (OUTPATIENT)
Dept: CARDIOLOGY | Facility: CLINIC | Age: 79
End: 2024-07-11
Payer: MEDICARE

## 2024-07-11 VITALS
SYSTOLIC BLOOD PRESSURE: 132 MMHG | WEIGHT: 179.5 LBS | BODY MASS INDEX: 25.7 KG/M2 | DIASTOLIC BLOOD PRESSURE: 77 MMHG | HEIGHT: 70 IN | OXYGEN SATURATION: 97 % | HEART RATE: 67 BPM | RESPIRATION RATE: 16 BRPM

## 2024-07-11 DIAGNOSIS — I10 BENIGN ESSENTIAL HYPERTENSION: Primary | ICD-10-CM

## 2024-07-11 PROCEDURE — 3078F DIAST BP <80 MM HG: CPT | Performed by: NURSE PRACTITIONER

## 2024-07-11 PROCEDURE — 1159F MED LIST DOCD IN RCRD: CPT | Performed by: NURSE PRACTITIONER

## 2024-07-11 PROCEDURE — 99214 OFFICE O/P EST MOD 30 MIN: CPT | Performed by: NURSE PRACTITIONER

## 2024-07-11 PROCEDURE — 1160F RVW MEDS BY RX/DR IN RCRD: CPT | Performed by: NURSE PRACTITIONER

## 2024-07-11 PROCEDURE — 3075F SYST BP GE 130 - 139MM HG: CPT | Performed by: NURSE PRACTITIONER

## 2024-07-11 PROCEDURE — 1157F ADVNC CARE PLAN IN RCRD: CPT | Performed by: NURSE PRACTITIONER

## 2024-07-11 PROCEDURE — 1036F TOBACCO NON-USER: CPT | Performed by: NURSE PRACTITIONER

## 2024-07-11 ASSESSMENT — ENCOUNTER SYMPTOMS
CONSTITUTIONAL NEGATIVE: 1
GASTROINTESTINAL NEGATIVE: 1
CARDIOVASCULAR NEGATIVE: 1
NEUROLOGICAL NEGATIVE: 1
MUSCULOSKELETAL NEGATIVE: 1
RESPIRATORY NEGATIVE: 1

## 2024-07-11 NOTE — PROGRESS NOTES
"Chief Complaint:   Follow-up    History Of Present Illness:    .Mr Huff returns in follow up.  Denies chest pain, sob, palpitations or pedal edema.  He had a LTK in February with UTI and DEBBIE complications, so in the hospital for a week.         Last Recorded Vitals:  Blood pressure 132/77, pulse 67, resp. rate 16, height 1.778 m (5' 10\"), weight 81.4 kg (179 lb 8 oz), SpO2 97%.     Past Medical History:  Past Medical History:   Diagnosis Date    BPH (benign prostatic hyperplasia)     CAD (coronary artery disease)     H/O left knee surgery     x 2 as a teen    Heart failure (Multi)     HLD (hyperlipidemia)     Hypertension     Old myocardial infarction 10/12/2021    History of acute myocardial infarction    Personal history of malignant neoplasm, unspecified     History of malignant neoplasm    Prostate cancer (Multi)     Skin cancer         Past Surgical History:  Past Surgical History:   Procedure Laterality Date    CARDIAC CATHETERIZATION      KNEE SURGERY Left     OTHER SURGICAL HISTORY  02/15/2022    Knee surgery    OTHER SURGICAL HISTORY  02/15/2022    Hernia repair       Social History:  Social History     Socioeconomic History    Marital status:    Tobacco Use    Smoking status: Former     Current packs/day: 0.00     Types: Cigarettes     Start date:      Quit date:      Years since quittin.5     Passive exposure: Never    Smokeless tobacco: Never   Vaping Use    Vaping status: Never Used   Substance and Sexual Activity    Alcohol use: Not Currently     Comment: OCCASIONAL    Drug use: Never    Sexual activity: Defer     Social Determinants of Health     Financial Resource Strain: Low Risk  (2024)    Overall Financial Resource Strain (CARDIA)     Difficulty of Paying Living Expenses: Not hard at all   Transportation Needs: No Transportation Needs (3/13/2024)    OASIS : Transportation     Lack of Transportation (Medical): No     Lack of Transportation (Non-Medical): No     " Patient Unable or Declines to Respond: No   Recent Concern: Transportation Needs - Unmet Transportation Needs (2/24/2024)    OASIS : Transportation     Lack of Transportation (Medical): Yes     Lack of Transportation (Non-Medical): Yes     Patient Unable or Declines to Respond: No   Social Connections: Feeling Socially Integrated (3/13/2024)    OASIS : Social Isolation     Frequency of experiencing loneliness or isolation: Never   Housing Stability: Low Risk  (2/16/2024)    Housing Stability Vital Sign     Unable to Pay for Housing in the Last Year: No     Number of Places Lived in the Last Year: 1     Unstable Housing in the Last Year: No       Family History:  Family History   Problem Relation Name Age of Onset    Other (CABG) Mother      Heart attack Father      Heart attack Brother      Other (CABG) Brother      Heart attack Brother      Other (CABG) Brother           Allergies:  Eszopiclone, Mirabegron, and Oxybutynin    Outpatient Medications:  Current Outpatient Medications   Medication Sig Dispense Refill    acetaminophen (Tylenol) 325 mg tablet Take 2 tablets (650 mg) by mouth every 4 hours if needed for mild pain (1 - 3). 30 tablet 0    allopurinol (Zyloprim) 300 mg tablet Take 1 tablet (300 mg) by mouth once daily.      atorvastatin (Lipitor) 20 mg tablet TAKE 1 TABLET BY MOUTH ONCE  DAILY 90 tablet 3    lisinopril 20 mg tablet Take 0.5 tablets (10 mg) by mouth once daily.      metoprolol succinate XL (Toprol-XL) 25 mg 24 hr tablet TAKE ONE TABLET BY MOUTH EVERY DAY 90 tablet 3    nitroglycerin (Nitrostat) 0.4 mg SL tablet Place 1 tablet (0.4 mg) under the tongue if needed for chest pain.      ondansetron ODT (Zofran-ODT) 4 mg disintegrating tablet Take 1 tablet (4 mg) by mouth every 8 hours if needed for nausea. 20 tablet 0    solifenacin (VESIcare) 10 mg tablet Take 1 tablet by mouth once daily 90 tablet 0    zolpidem (Ambien) 10 mg tablet TAKE 1/2 TO 1 TABLET BY MOUTH  ONCE DAILY AT BEDTIME  90 tablet 0     No current facility-administered medications for this visit.        Physical Exam:  Cardiovascular:      PMI at left midclavicular line. Normal rate. Regular rhythm. Normal S1. Normal S2.       Murmurs: There is no murmur.      No gallop.  No click. No rub.   Pulses:     Intact distal pulses.   Edema:     Peripheral edema absent.         ROS:  Review of Systems   Constitutional: Negative.   Cardiovascular: Negative.    Respiratory: Negative.     Skin: Negative.    Musculoskeletal: Negative.    Gastrointestinal: Negative.    Genitourinary: Negative.    Neurological: Negative.           Last Labs:  CBC -  Lab Results   Component Value Date    WBC 7.0 04/25/2024    HGB 11.7 (L) 04/25/2024    HCT 37.0 (L) 04/25/2024     (H) 04/25/2024     04/25/2024       CMP -  Lab Results   Component Value Date    CALCIUM 9.3 04/25/2024    PHOS 3.0 02/18/2024    PROT 5.8 (L) 02/17/2024    ALBUMIN 2.7 (L) 02/18/2024    AST 45 (H) 02/17/2024    ALT 24 02/17/2024    ALKPHOS 112 02/17/2024    BILITOT 0.8 02/17/2024       LIPID PANEL -   Lab Results   Component Value Date    CHOL 118 02/06/2024    TRIG 124 02/06/2024    HDL 48.1 02/06/2024    CHHDL 2.5 02/06/2024    LDLF 41 01/21/2022    VLDL 25 02/06/2024    NHDL 70 02/06/2024       RENAL FUNCTION PANEL -   Lab Results   Component Value Date    GLUCOSE 99 04/25/2024     04/25/2024    K 4.3 04/25/2024     (H) 04/25/2024    CO2 26 04/25/2024    ANIONGAP 11 04/25/2024    BUN 22 04/25/2024    CREATININE 1.13 04/25/2024    CALCIUM 9.3 04/25/2024    PHOS 3.0 02/18/2024    ALBUMIN 2.7 (L) 02/18/2024        Lab Results   Component Value Date    BNP 29 02/16/2024         Assessment/Plan   Problem List Items Addressed This Visit    None        1. CAD. Patient has a history of documented coronary artery disease with a non-STEMI on 6/15/2016 secondary to complete occlusion of the diagonal branch, not an amenable to percutaneous coronary intervention. Patient  had a very similar episode of chest discomfort lasting nearly 3 hours for which she was seen at the [place] emergency department on October 11, 2021. EKG and high sensitive troponins were negative for infarction. Given the very similar nature of his symptoms to the original non-STEMI, diagnostic coronary angiography was recommended. The patient underwent diagnostic coronary angiography with LMCA normal, LAD has minor proximal luminal irregularities, complete 100% occlusion of the proximal first diagonal branch as previously noted with retrograde filling. The nondominant LCx has moderate disease. The dominant right coronary artery also has minor disease except for a 50% stenosis within the early mid posterior descending branch unchanged from his previous study as well. The left ventricle EF shows preserved left ventricular function. No significant change in anatomy since his study that was performed 6/15/2016. Medical management was recommended. Echo done 04/2022 showed EF 60-65% with mild focal basal septal hypertrophy. We will have him return in 6 months.     2. Hypertension.     3. Hyperlipidemia.        Mariela Goodson, APRN-CNP

## 2024-07-17 DIAGNOSIS — F51.01 PRIMARY INSOMNIA: ICD-10-CM

## 2024-07-17 RX ORDER — ZOLPIDEM TARTRATE 10 MG/1
TABLET ORAL
Qty: 90 TABLET | Refills: 1 | Status: SHIPPED | OUTPATIENT
Start: 2024-07-17

## 2024-07-26 ENCOUNTER — TELEPHONE (OUTPATIENT)
Dept: PRIMARY CARE | Facility: CLINIC | Age: 79
End: 2024-07-26
Payer: MEDICARE

## 2024-07-26 DIAGNOSIS — I10 BENIGN ESSENTIAL HYPERTENSION: Primary | ICD-10-CM

## 2024-07-26 RX ORDER — LISINOPRIL 20 MG/1
10 TABLET ORAL DAILY
Qty: 90 TABLET | Refills: 3 | Status: SHIPPED | OUTPATIENT
Start: 2024-07-26

## 2024-08-12 ENCOUNTER — OFFICE VISIT (OUTPATIENT)
Dept: PRIMARY CARE | Facility: CLINIC | Age: 79
End: 2024-08-12
Payer: MEDICARE

## 2024-08-12 VITALS
WEIGHT: 171 LBS | HEART RATE: 84 BPM | BODY MASS INDEX: 24.54 KG/M2 | OXYGEN SATURATION: 93 % | DIASTOLIC BLOOD PRESSURE: 70 MMHG | SYSTOLIC BLOOD PRESSURE: 120 MMHG | TEMPERATURE: 98.7 F

## 2024-08-12 DIAGNOSIS — J02.9 PHARYNGITIS, UNSPECIFIED ETIOLOGY: Primary | ICD-10-CM

## 2024-08-12 PROCEDURE — 99214 OFFICE O/P EST MOD 30 MIN: CPT | Performed by: INTERNAL MEDICINE

## 2024-08-12 PROCEDURE — 3074F SYST BP LT 130 MM HG: CPT | Performed by: INTERNAL MEDICINE

## 2024-08-12 PROCEDURE — 1159F MED LIST DOCD IN RCRD: CPT | Performed by: INTERNAL MEDICINE

## 2024-08-12 PROCEDURE — 1157F ADVNC CARE PLAN IN RCRD: CPT | Performed by: INTERNAL MEDICINE

## 2024-08-12 PROCEDURE — 3078F DIAST BP <80 MM HG: CPT | Performed by: INTERNAL MEDICINE

## 2024-08-12 PROCEDURE — 1036F TOBACCO NON-USER: CPT | Performed by: INTERNAL MEDICINE

## 2024-08-12 PROCEDURE — 1126F AMNT PAIN NOTED NONE PRSNT: CPT | Performed by: INTERNAL MEDICINE

## 2024-08-12 RX ORDER — AZITHROMYCIN 250 MG/1
TABLET, FILM COATED ORAL
Qty: 6 TABLET | Refills: 0 | Status: SHIPPED | OUTPATIENT
Start: 2024-08-12 | End: 2024-08-17

## 2024-08-12 ASSESSMENT — ENCOUNTER SYMPTOMS
LOSS OF SENSATION IN FEET: 0
DEPRESSION: 0
OCCASIONAL FEELINGS OF UNSTEADINESS: 0

## 2024-08-12 ASSESSMENT — PAIN SCALES - GENERAL: PAINLEVEL: 0-NO PAIN

## 2024-08-12 NOTE — PROGRESS NOTES
Subjective   Patient ID: Avery Huff is a 79 y.o. male who presents for Sore Throat.    Sore throat comes and goes for 2-3 weeks. Lately hasn't been able to eat or drink much-everything burns going down. Coughing quite a bit and bringing up phlegm.  Cold all the time but then wakes up sweating. No runny nose.  Tested negative for covid x2.     Grieving-wif of 68yrs  few weeks ago             Objective   /70   Pulse 84   Temp 37.1 °C (98.7 °F)   Wt 77.6 kg (171 lb)   SpO2 93%   BMI 24.54 kg/m²     Physical Exam  HENT:      Mouth/Throat:      Pharynx: Oropharyngeal exudate and posterior oropharyngeal erythema present.   Cardiovascular:      Rate and Rhythm: Normal rate and regular rhythm.   Pulmonary:      Effort: Pulmonary effort is normal.      Breath sounds: Normal breath sounds.   Lymphadenopathy:      Cervical: Cervical adenopathy present.   Psychiatric:      Comments: tearful         Assessment/Plan  will presumptively treat for strep-advised to call if doesn't improve  Diagnoses and all orders for this visit:  Pharyngitis, unspecified etiology  -     azithromycin (Zithromax) 250 mg tablet; Take 2 tablets (500 mg) by mouth once daily for 1 day, THEN 1 tablet (250 mg) once daily for 4 days. Take 2 tabs (500 mg) by mouth today, than 1 daily for 4 days..

## 2024-08-14 ENCOUNTER — APPOINTMENT (OUTPATIENT)
Dept: PRIMARY CARE | Facility: CLINIC | Age: 79
End: 2024-08-14
Payer: MEDICARE

## 2024-09-21 ENCOUNTER — HOSPITAL ENCOUNTER (EMERGENCY)
Facility: HOSPITAL | Age: 79
Discharge: HOME | End: 2024-09-21
Attending: STUDENT IN AN ORGANIZED HEALTH CARE EDUCATION/TRAINING PROGRAM
Payer: MEDICARE

## 2024-09-21 VITALS
WEIGHT: 170 LBS | SYSTOLIC BLOOD PRESSURE: 171 MMHG | OXYGEN SATURATION: 100 % | HEART RATE: 55 BPM | RESPIRATION RATE: 16 BRPM | TEMPERATURE: 97.4 F | DIASTOLIC BLOOD PRESSURE: 82 MMHG | HEIGHT: 70 IN | BODY MASS INDEX: 24.34 KG/M2

## 2024-09-21 DIAGNOSIS — T15.90XA FOREIGN BODY IN EYE, UNSPECIFIED LATERALITY, INITIAL ENCOUNTER: Primary | ICD-10-CM

## 2024-09-21 PROCEDURE — 99283 EMERGENCY DEPT VISIT LOW MDM: CPT

## 2024-09-21 ASSESSMENT — PAIN SCALES - GENERAL
PAINLEVEL_OUTOF10: 2
PAINLEVEL_OUTOF10: 2

## 2024-09-21 ASSESSMENT — PAIN DESCRIPTION - ORIENTATION: ORIENTATION: RIGHT

## 2024-09-21 ASSESSMENT — PAIN DESCRIPTION - LOCATION: LOCATION: EYE

## 2024-09-21 ASSESSMENT — PAIN DESCRIPTION - PAIN TYPE: TYPE: ACUTE PAIN

## 2024-09-21 ASSESSMENT — PAIN - FUNCTIONAL ASSESSMENT: PAIN_FUNCTIONAL_ASSESSMENT: 0-10

## 2024-09-21 NOTE — ED TRIAGE NOTES
States he got a new hard contact lens and he can't get it out. States he thinks it is place but he is unable to remove it and has worn contacts for many years

## 2024-09-22 NOTE — ED PROVIDER NOTES
Chief Complaint: Contact stuck in right eye  HPI: This is an 79-year-old male, presenting to the emergency department for concern of a contact stuck in his right eye which he noted today.  Patient states that he got a new set of gas-permeable glass lenses, and had them in his eye for about 4 hours today.  He states when he went to take them out, he was unable to get the contact out, and so presents to the emergency department.  He denies any pain in his eye.  He denies any blurred vision, double vision.  No other complaints at this time.    Past Medical History:   Diagnosis Date    BPH (benign prostatic hyperplasia)     CAD (coronary artery disease)     H/O left knee surgery     x 2 as a teen    Heart failure (Multi)     HLD (hyperlipidemia)     Hypertension     Old myocardial infarction 10/12/2021    History of acute myocardial infarction    Personal history of malignant neoplasm, unspecified     History of malignant neoplasm    Prostate cancer (Multi)     Skin cancer       Past Surgical History:   Procedure Laterality Date    CARDIAC CATHETERIZATION  2016    KNEE SURGERY Left     OTHER SURGICAL HISTORY  02/15/2022    Knee surgery    OTHER SURGICAL HISTORY  02/15/2022    Hernia repair       Physical Exam  Constitutional:       Appearance: Normal appearance.   HENT:      Head: Normocephalic and atraumatic.      Mouth/Throat:      Mouth: Mucous membranes are moist.   Eyes:      Extraocular Movements: Extraocular movements intact.      Conjunctiva/sclera: Conjunctivae normal.      Pupils: Pupils are equal, round, and reactive to light.      Comments: No visible foreign body  Fluorescein exam performed, no fluorescein uptake   Pulmonary:      Effort: Pulmonary effort is normal.   Abdominal:      General: Abdomen is flat.   Musculoskeletal:         General: No swelling.   Neurological:      General: No focal deficit present.      Mental Status: He is alert.   Psychiatric:         Mood and Affect: Mood normal.             ED Course/[facility]  Diagnoses as of 09/21/24 2052   Foreign body in eye, unspecified laterality, initial encounter       This is a 79 y.o. male presenting to the ED for evaluation of concern for stuck contact of his right eye.  Patient states that he got healing gas-permeable lenses, and is concerned that it is stuck in his eye.  On physical exam, the patient is resting comfortably in the bed, no acute distress.  I am unable to see any contact lens in the eye.  I did do a fluorescein exam, no fluorescein uptake, no visible foreign body.  The patient brought his other contact lens, and I had him put it in the other eye to compare, and again do not see any foreign bodies or contact lens in his right eye.  Patient was reassured, and advised to follow-up as needed with ophthalmology.  He was advised to return to the emergency department for any new or worsening symptoms and was discharged home.  Of note, the patient did call back to this emergency department about an hour after leaving, to confirm that he did find his contact on the bathroom floor, no further concern for foreign body    Final Impression  1.  Foreign body concern in the eye  Disposition/Plan: Discharge home  Condition at disposition: Stable.     Skye Burnham DO  Emergency Medicine Physician     Skye Burnham,   09/21/24 2057

## 2024-10-09 ENCOUNTER — OFFICE VISIT (OUTPATIENT)
Dept: PRIMARY CARE | Facility: CLINIC | Age: 79
End: 2024-10-09
Payer: MEDICARE

## 2024-10-09 VITALS
SYSTOLIC BLOOD PRESSURE: 138 MMHG | OXYGEN SATURATION: 99 % | BODY MASS INDEX: 24.91 KG/M2 | HEART RATE: 58 BPM | HEIGHT: 70 IN | WEIGHT: 174 LBS | DIASTOLIC BLOOD PRESSURE: 74 MMHG

## 2024-10-09 DIAGNOSIS — M89.8X9 BONE MASS: Primary | ICD-10-CM

## 2024-10-09 DIAGNOSIS — N20.0 RENAL STONE: ICD-10-CM

## 2024-10-09 DIAGNOSIS — I10 BENIGN ESSENTIAL HYPERTENSION: ICD-10-CM

## 2024-10-09 PROBLEM — G47.00 INSOMNIA: Status: ACTIVE | Noted: 2024-01-31

## 2024-10-09 PROBLEM — R13.10 DYSPHAGIA: Status: ACTIVE | Noted: 2024-10-09

## 2024-10-09 PROBLEM — Z85.9 HISTORY OF MALIGNANT NEOPLASM: Status: ACTIVE | Noted: 2024-10-09

## 2024-10-09 PROBLEM — K21.9 GASTROESOPHAGEAL REFLUX DISEASE: Status: ACTIVE | Noted: 2024-10-09

## 2024-10-09 PROCEDURE — 1036F TOBACCO NON-USER: CPT | Performed by: INTERNAL MEDICINE

## 2024-10-09 PROCEDURE — 1159F MED LIST DOCD IN RCRD: CPT | Performed by: INTERNAL MEDICINE

## 2024-10-09 PROCEDURE — G0008 ADMIN INFLUENZA VIRUS VAC: HCPCS | Performed by: INTERNAL MEDICINE

## 2024-10-09 PROCEDURE — 1126F AMNT PAIN NOTED NONE PRSNT: CPT | Performed by: INTERNAL MEDICINE

## 2024-10-09 PROCEDURE — 99213 OFFICE O/P EST LOW 20 MIN: CPT | Performed by: INTERNAL MEDICINE

## 2024-10-09 PROCEDURE — 1157F ADVNC CARE PLAN IN RCRD: CPT | Performed by: INTERNAL MEDICINE

## 2024-10-09 PROCEDURE — 3075F SYST BP GE 130 - 139MM HG: CPT | Performed by: INTERNAL MEDICINE

## 2024-10-09 PROCEDURE — 3078F DIAST BP <80 MM HG: CPT | Performed by: INTERNAL MEDICINE

## 2024-10-09 RX ORDER — ALLOPURINOL 300 MG/1
300 TABLET ORAL DAILY
Qty: 90 TABLET | Refills: 3 | Status: SHIPPED | OUTPATIENT
Start: 2024-10-09

## 2024-10-09 RX ORDER — LISINOPRIL 10 MG/1
10 TABLET ORAL DAILY
Qty: 100 TABLET | Refills: 3 | Status: SHIPPED | OUTPATIENT
Start: 2024-10-09 | End: 2025-11-13

## 2024-10-09 RX ORDER — DOCUSATE SODIUM 100 MG/1
100 CAPSULE, LIQUID FILLED ORAL
COMMUNITY
End: 2024-10-09 | Stop reason: ALTCHOICE

## 2024-10-09 ASSESSMENT — ENCOUNTER SYMPTOMS
DEPRESSION: 0
OCCASIONAL FEELINGS OF UNSTEADINESS: 0
LOSS OF SENSATION IN FEET: 0

## 2024-10-09 ASSESSMENT — PATIENT HEALTH QUESTIONNAIRE - PHQ9
10. IF YOU CHECKED OFF ANY PROBLEMS, HOW DIFFICULT HAVE THESE PROBLEMS MADE IT FOR YOU TO DO YOUR WORK, TAKE CARE OF THINGS AT HOME, OR GET ALONG WITH OTHER PEOPLE: SOMEWHAT DIFFICULT
SUM OF ALL RESPONSES TO PHQ9 QUESTIONS 1 AND 2: 2
1. LITTLE INTEREST OR PLEASURE IN DOING THINGS: SEVERAL DAYS
2. FEELING DOWN, DEPRESSED OR HOPELESS: SEVERAL DAYS

## 2024-10-09 ASSESSMENT — COLUMBIA-SUICIDE SEVERITY RATING SCALE - C-SSRS
6. HAVE YOU EVER DONE ANYTHING, STARTED TO DO ANYTHING, OR PREPARED TO DO ANYTHING TO END YOUR LIFE?: NO
1. IN THE PAST MONTH, HAVE YOU WISHED YOU WERE DEAD OR WISHED YOU COULD GO TO SLEEP AND NOT WAKE UP?: NO
2. HAVE YOU ACTUALLY HAD ANY THOUGHTS OF KILLING YOURSELF?: NO

## 2024-10-09 ASSESSMENT — PAIN SCALES - GENERAL: PAINLEVEL: 0-NO PAIN

## 2024-10-09 NOTE — PROGRESS NOTES
"Subjective   Patient ID: Avery Huff is a 79 y.o. male who presents for bump on chest.    Noticed a lump mid chest week or so ago-non tender and doesn't bother him. Thinks its because he lost weight but friends and family are concerned. Had lost over 25lbs aftr his surg-a bit more when wife ill and . Has regained a little recently             Objective   /74   Pulse 58   Ht 1.778 m (5' 10\")   Wt 78.9 kg (174 lb)   SpO2 99%   BMI 24.97 kg/m²     Physical Exam  Chest:      Comments: Along R mid sternum firm adherant 1.5cm lump-nontender-feels like bone. Very little muscle on chest        Assessment/Plan   Assessment & Plan  Bone mass  Will monitor--but I agree likely from long ago bone injury-if grows or starts to hurt will image-he declines at this time       Benign essential hypertension    Orders:    lisinopril 10 mg tablet; Take 1 tablet (10 mg) by mouth once daily.    Renal stone    Orders:    allopurinol (Zyloprim) 300 mg tablet; Take 1 tablet (300 mg) by mouth once daily.                                                                                                                                                                          "

## 2024-10-14 DIAGNOSIS — N40.1 BENIGN NON-NODULAR PROSTATIC HYPERPLASIA WITH LOWER URINARY TRACT SYMPTOMS: ICD-10-CM

## 2024-10-14 RX ORDER — SOLIFENACIN SUCCINATE 10 MG/1
10 TABLET, FILM COATED ORAL DAILY
Qty: 90 TABLET | Refills: 3 | Status: SHIPPED | OUTPATIENT
Start: 2024-10-14

## 2025-01-16 ENCOUNTER — APPOINTMENT (OUTPATIENT)
Dept: CARDIOLOGY | Facility: CLINIC | Age: 80
End: 2025-01-16
Payer: MEDICARE

## 2025-01-17 ENCOUNTER — OFFICE VISIT (OUTPATIENT)
Dept: PRIMARY CARE | Facility: CLINIC | Age: 80
End: 2025-01-17
Payer: MEDICARE

## 2025-01-17 VITALS
OXYGEN SATURATION: 97 % | HEART RATE: 56 BPM | BODY MASS INDEX: 25.37 KG/M2 | DIASTOLIC BLOOD PRESSURE: 78 MMHG | WEIGHT: 176.8 LBS | SYSTOLIC BLOOD PRESSURE: 152 MMHG

## 2025-01-17 DIAGNOSIS — K21.9 GASTROESOPHAGEAL REFLUX DISEASE WITHOUT ESOPHAGITIS: ICD-10-CM

## 2025-01-17 DIAGNOSIS — C61 PROSTATE CANCER (MULTI): ICD-10-CM

## 2025-01-17 DIAGNOSIS — I10 BENIGN ESSENTIAL HYPERTENSION: Primary | ICD-10-CM

## 2025-01-17 DIAGNOSIS — Z79.899 HIGH RISK MEDICATION USE: ICD-10-CM

## 2025-01-17 DIAGNOSIS — Z96.652 S/P TOTAL KNEE ARTHROPLASTY, LEFT: ICD-10-CM

## 2025-01-17 DIAGNOSIS — I50.42 CHRONIC COMBINED SYSTOLIC AND DIASTOLIC HEART FAILURE: ICD-10-CM

## 2025-01-17 DIAGNOSIS — F51.01 PRIMARY INSOMNIA: ICD-10-CM

## 2025-01-17 DIAGNOSIS — I25.10 CORONARY ARTERIOSCLEROSIS: ICD-10-CM

## 2025-01-17 DIAGNOSIS — L71.9 ROSACEA: ICD-10-CM

## 2025-01-17 DIAGNOSIS — Z12.5 SCREENING FOR PROSTATE CANCER: ICD-10-CM

## 2025-01-17 DIAGNOSIS — Z85.9 HISTORY OF MALIGNANT NEOPLASM: ICD-10-CM

## 2025-01-17 DIAGNOSIS — N40.1 BENIGN NON-NODULAR PROSTATIC HYPERPLASIA WITH LOWER URINARY TRACT SYMPTOMS: ICD-10-CM

## 2025-01-17 DIAGNOSIS — E78.2 MIXED HYPERLIPIDEMIA: ICD-10-CM

## 2025-01-17 PROBLEM — R04.0 EPISTAXIS: Status: RESOLVED | Noted: 2023-09-17 | Resolved: 2025-01-17

## 2025-01-17 PROBLEM — N17.9 ACUTE RENAL FAILURE (ARF) (CMS-HCC): Status: RESOLVED | Noted: 2024-02-16 | Resolved: 2025-01-17

## 2025-01-17 LAB
AMPHETAMINES UR QL SCN: NORMAL
BARBITURATES UR QL SCN: NORMAL
BENZODIAZ UR QL SCN: NORMAL
BZE UR QL SCN: NORMAL
CANNABINOIDS UR QL SCN: NORMAL
FENTANYL+NORFENTANYL UR QL SCN: NORMAL
METHADONE UR QL SCN: NORMAL
OPIATES UR QL SCN: NORMAL
OXYCODONE+OXYMORPHONE UR QL SCN: NORMAL
PCP UR QL SCN: NORMAL

## 2025-01-17 PROCEDURE — 1036F TOBACCO NON-USER: CPT | Performed by: INTERNAL MEDICINE

## 2025-01-17 PROCEDURE — 99214 OFFICE O/P EST MOD 30 MIN: CPT | Performed by: INTERNAL MEDICINE

## 2025-01-17 PROCEDURE — 3077F SYST BP >= 140 MM HG: CPT | Performed by: INTERNAL MEDICINE

## 2025-01-17 PROCEDURE — 1126F AMNT PAIN NOTED NONE PRSNT: CPT | Performed by: INTERNAL MEDICINE

## 2025-01-17 PROCEDURE — 1159F MED LIST DOCD IN RCRD: CPT | Performed by: INTERNAL MEDICINE

## 2025-01-17 PROCEDURE — 3078F DIAST BP <80 MM HG: CPT | Performed by: INTERNAL MEDICINE

## 2025-01-17 PROCEDURE — 80307 DRUG TEST PRSMV CHEM ANLYZR: CPT | Performed by: INTERNAL MEDICINE

## 2025-01-17 PROCEDURE — 1157F ADVNC CARE PLAN IN RCRD: CPT | Performed by: INTERNAL MEDICINE

## 2025-01-17 PROCEDURE — G2211 COMPLEX E/M VISIT ADD ON: HCPCS | Performed by: INTERNAL MEDICINE

## 2025-01-17 RX ORDER — METRONIDAZOLE 7.5 MG/G
1 GEL TOPICAL 2 TIMES DAILY
COMMUNITY
End: 2025-01-17 | Stop reason: SDUPTHER

## 2025-01-17 RX ORDER — METRONIDAZOLE 7.5 MG/G
GEL TOPICAL 2 TIMES DAILY
Qty: 35 G | Refills: 11 | Status: SHIPPED | OUTPATIENT
Start: 2025-01-17

## 2025-01-17 ASSESSMENT — ENCOUNTER SYMPTOMS
LOSS OF SENSATION IN FEET: 0
CONSTIPATION: 0
ARTHRALGIAS: 1
SHORTNESS OF BREATH: 0
NAUSEA: 0
ABDOMINAL PAIN: 0
DIARRHEA: 0
OCCASIONAL FEELINGS OF UNSTEADINESS: 0
ABDOMINAL DISTENTION: 0
CONSTITUTIONAL NEGATIVE: 1
ACTIVITY CHANGE: 0
DYSURIA: 0
VOMITING: 0
PSYCHIATRIC NEGATIVE: 1
PALPITATIONS: 0
DEPRESSION: 0
COUGH: 0

## 2025-01-17 ASSESSMENT — PAIN SCALES - GENERAL: PAINLEVEL_OUTOF10: 0-NO PAIN

## 2025-01-17 NOTE — PROGRESS NOTES
Subjective   Patient ID: Avery Huff is a 80 y.o. male who presents for 3 month follow up .    CAD with CHF-stable on meds-no NTG use-managed by Dr Edgar.     Hypertension-compliant and stable on current medications BP Readings from Last 3 Encounters:  01/17/25 : 152/78  10/09/24 : 138/74  09/21/24 : 171/82     ARF post knee replacement  last eGFR 65  4/2024---has resolved     Hyperlipidemia-stable and compliant on meds. Taking. Lab Results       Component                Value               Date                       LDLCALC                  56 (L)              01/31/2023          H/O prostate cancer-now with urinary issue and ED-on vesicare    Kidney stone-uric acid  . Taking allopurinol.   Lab Results       Component                Value               Date                       URICACID                 5.5                 01/31/2023          Chronic insomnia-relies on zolpidem to sleep. [place] reviewed 1/17/2025. E consent signed 1/17/2025.     Exercise -just back to a little yard work, still going to therapy after knee replacement, hopes to golf       Diet  - eating nearly back to normal           Review of Systems   Constitutional: Negative.  Negative for activity change.        Doing a little better; weight stable on home scale   Respiratory:  Negative for cough and shortness of breath.    Cardiovascular:  Negative for chest pain, palpitations and leg swelling (resolved).   Gastrointestinal:  Negative for abdominal distention, abdominal pain, constipation, diarrhea, nausea and vomiting.   Genitourinary:  Negative for dysuria.   Musculoskeletal:  Positive for arthralgias (catrina L knee).   Skin:  Negative for rash.   Psychiatric/Behavioral: Negative.         Objective   /78 (BP Location: Left arm, Patient Position: Sitting)   Pulse 56   Wt 80.2 kg (176 lb 12.8 oz)   SpO2 97%   BMI 25.37 kg/m²     Physical Exam  Vitals reviewed.   Constitutional:       General: He is not in acute distress.      Appearance: Normal appearance.      Comments: No longer Using cane    Cardiovascular:      Rate and Rhythm: Normal rate and regular rhythm.      Heart sounds: Normal heart sounds. No murmur heard.     No gallop.   Pulmonary:      Breath sounds: Normal breath sounds. No wheezing, rhonchi or rales.   Skin:     General: Skin is warm and dry.      Findings: No rash.      Comments: Early tan   Neurological:      General: No focal deficit present.      Mental Status: He is alert and oriented to person, place, and time.   Psychiatric:         Mood and Affect: Mood normal.       Assessment/Plan  will continue regimen; check labs  Diagnoses and all orders for this visit:  Benign essential hypertension  -     Comprehensive Metabolic Panel; Future  Coronary arteriosclerosis  Chronic combined systolic and diastolic heart failure  -     CBC and Auto Differential; Future  Mixed hyperlipidemia  -     Lipid Panel; Future  Gastroesophageal reflux disease without esophagitis  Benign non-nodular prostatic hyperplasia with lower urinary tract symptoms  History of malignant neoplasm  -     Prostate Specific Antigen, Screen; Future  Prostate cancer (Multi)  S/P total knee arthroplasty, left  Primary insomnia  Rosacea  -     metroNIDAZOLE (Metrogel) 0.75 % gel; Apply topically 2 times a day.  High risk medication use  -     Drug Screen, Urine With Reflex to Confirmation  -     Drug Screen, Urine With Reflex to Confirmation  Screening for prostate cancer  -     Prostate Specific Antigen, Screen; Future    Current Outpatient Medications:     acetaminophen (Tylenol) 325 mg tablet, Take 2 tablets (650 mg) by mouth every 4 hours if needed for mild pain (1 - 3)., Disp: 30 tablet, Rfl: 0    allopurinol (Zyloprim) 300 mg tablet, Take 1 tablet (300 mg) by mouth once daily., Disp: 90 tablet, Rfl: 3    atorvastatin (Lipitor) 20 mg tablet, TAKE 1 TABLET BY MOUTH ONCE  DAILY, Disp: 90 tablet, Rfl: 3    lisinopril 10 mg tablet, Take 1 tablet (10 mg) by  mouth once daily., Disp: 100 tablet, Rfl: 3    metoprolol succinate XL (Toprol-XL) 25 mg 24 hr tablet, TAKE ONE TABLET BY MOUTH EVERY DAY, Disp: 90 tablet, Rfl: 3    nitroglycerin (Nitrostat) 0.4 mg SL tablet, Place 1 tablet (0.4 mg) under the tongue if needed for chest pain., Disp: , Rfl:     ondansetron ODT (Zofran-ODT) 4 mg disintegrating tablet, Take 1 tablet (4 mg) by mouth every 8 hours if needed for nausea., Disp: 20 tablet, Rfl: 0    solifenacin (VESIcare) 10 mg tablet, Take 1 tablet by mouth once daily, Disp: 90 tablet, Rfl: 3    zolpidem (Ambien) 10 mg tablet, TAKE 1/2 TO 1 TABLET BY MOUTH  ONCE DAILY AT BEDTIME, Disp: 90 tablet, Rfl: 1    metroNIDAZOLE (Metrogel) 0.75 % gel, Apply topically 2 times a day., Disp: 35 g, Rfl: 11

## 2025-01-28 NOTE — LETTER
January 29, 2025     Avery Huff  [address] [facility] [address]      Dear Mr. Huff:    Below are the results from your recent visit:  Labs good--PSA 0.9   Resulted Orders   Lipid Panel   Result Value Ref Range    CHOLESTEROL, TOTAL 121 <200 mg/dL    HDL CHOLESTEROL 52 > OR = 40 mg/dL    TRIGLYCERIDES 53 <150 mg/dL    LDL-CHOLESTEROL 56 mg/dL (calc)      Comment:      Reference range: <100     Desirable range <100 mg/dL for primary prevention;    <70 mg/dL for patients with CHD or diabetic patients   with > or = 2 CHD risk factors.     LDL-C is now calculated using the Alejandro-Favio   calculation, which is a validated novel method providing   better accuracy than the Friedewald equation in the   estimation of LDL-C.   Alejandro SS et al. JAIRON. 2013;310(38): 4946-3221   (http://education.Coupsta/faq/GNB106)      CHOL/HDLC RATIO 2.3 <5.0 (calc)    NON HDL CHOLESTEROL 69 <130 mg/dL (calc)      Comment:      For patients with diabetes plus 1 major ASCVD risk   factor, treating to a non-HDL-C goal of <100 mg/dL   (LDL-C of <70 mg/dL) is considered a therapeutic   option.     Comprehensive Metabolic Panel   Result Value Ref Range    GLUCOSE 93 65 - 99 mg/dL      Comment:                    Fasting reference interval         UREA NITROGEN (BUN) 25 7 - 25 mg/dL    CREATININE 1.16 0.70 - 1.22 mg/dL    EGFR 63 > OR = 60 mL/min/1.73m2    SODIUM 138 135 - 146 mmol/L    POTASSIUM 4.6 3.5 - 5.3 mmol/L    CHLORIDE 108 98 - 110 mmol/L    CARBON DIOXIDE 21 20 - 32 mmol/L    ELECTROLYTE BALANCE 9 7 - 17 mmol/L (calc)    CALCIUM 9.1 8.6 - 10.3 mg/dL    PROTEIN, TOTAL 6.5 6.1 - 8.1 g/dL    ALBUMIN 4.3 3.6 - 5.1 g/dL    BILIRUBIN, TOTAL 0.8 0.2 - 1.2 mg/dL    ALKALINE PHOSPHATASE 83 35 - 144 U/L    AST 18 10 - 35 U/L    ALT 16 9 - 46 U/L   CBC and Auto Differential   Result Value Ref Range    WHITE BLOOD CELL COUNT 7.0 3.8 - 10.8 Thousand/uL    RED BLOOD CELL COUNT 4.13 (L) 4.20 - 5.80 Million/uL     HEMOGLOBIN 13.4 13.2 - 17.1 g/dL    HEMATOCRIT 39.7 38.5 - 50.0 %    MCV 96.1 80.0 - 100.0 fL    MCH 32.4 27.0 - 33.0 pg    MCHC 33.8 32.0 - 36.0 g/dL      Comment:      For adults, a slight decrease in the calculated MCHC  value (in the range of 30 to 32 g/dL) is most likely  not clinically significant; however, it should be  interpreted with caution in correlation with other  red cell parameters and the patient's clinical  condition.      RDW 13.0 11.0 - 15.0 %    PLATELET COUNT 229 140 - 400 Thousand/uL    MPV 9.7 7.5 - 12.5 fL    ABSOLUTE NEUTROPHILS 4,354 1,500 - 7,800 cells/uL    ABSOLUTE LYMPHOCYTES 1,743 850 - 3,900 cells/uL    ABSOLUTE MONOCYTES 693 200 - 950 cells/uL    ABSOLUTE EOSINOPHILS 189 15 - 500 cells/uL    ABSOLUTE BASOPHILS 21 0 - 200 cells/uL    NEUTROPHILS 62.2 %    LYMPHOCYTES 24.9 %    MONOCYTES 9.9 %    EOSINOPHILS 2.7 %    BASOPHILS 0.3 %   Prostate Specific Antigen, Screen   Result Value Ref Range    PSA, TOTAL 0.90 < OR = 4.00 ng/mL      Comment:      The total PSA value from this assay system is   standardized against the WHO standard. The test   result will be approximately 20% lower when compared   to the equimolar-standardized total PSA ([place]   [place]). Comparison of serial PSA results should be   interpreted with this fact in mind.     This test was performed using the Siemens   chemiluminescent method. Values obtained from   different assay methods cannot be used  interchangeably. PSA levels, regardless of  value, should not be interpreted as absolute  evidence of the presence or absence of disease.       The test results show that your current treatment is working. Please continue your current medication and plan.     If you have any questions or concerns, please don't hesitate to call.         Sincerely,        Maday Koo MD

## 2025-01-29 ENCOUNTER — APPOINTMENT (OUTPATIENT)
Dept: CARDIOLOGY | Facility: CLINIC | Age: 80
End: 2025-01-29
Payer: MEDICARE

## 2025-01-29 LAB
ALBUMIN SERPL-MCNC: 4.3 G/DL (ref 3.6–5.1)
ALP SERPL-CCNC: 83 U/L (ref 35–144)
ALT SERPL-CCNC: 16 U/L (ref 9–46)
ANION GAP SERPL CALCULATED.4IONS-SCNC: 9 MMOL/L (CALC) (ref 7–17)
AST SERPL-CCNC: 18 U/L (ref 10–35)
BASOPHILS # BLD AUTO: 21 CELLS/UL (ref 0–200)
BASOPHILS NFR BLD AUTO: 0.3 %
BILIRUB SERPL-MCNC: 0.8 MG/DL (ref 0.2–1.2)
BUN SERPL-MCNC: 25 MG/DL (ref 7–25)
CALCIUM SERPL-MCNC: 9.1 MG/DL (ref 8.6–10.3)
CHLORIDE SERPL-SCNC: 108 MMOL/L (ref 98–110)
CHOLEST SERPL-MCNC: 121 MG/DL
CHOLEST/HDLC SERPL: 2.3 (CALC)
CO2 SERPL-SCNC: 21 MMOL/L (ref 20–32)
CREAT SERPL-MCNC: 1.16 MG/DL (ref 0.7–1.22)
EGFRCR SERPLBLD CKD-EPI 2021: 63 ML/MIN/1.73M2
EOSINOPHIL # BLD AUTO: 189 CELLS/UL (ref 15–500)
EOSINOPHIL NFR BLD AUTO: 2.7 %
ERYTHROCYTE [DISTWIDTH] IN BLOOD BY AUTOMATED COUNT: 13 % (ref 11–15)
GLUCOSE SERPL-MCNC: 93 MG/DL (ref 65–99)
HCT VFR BLD AUTO: 39.7 % (ref 38.5–50)
HDLC SERPL-MCNC: 52 MG/DL
HGB BLD-MCNC: 13.4 G/DL (ref 13.2–17.1)
LDLC SERPL CALC-MCNC: 56 MG/DL (CALC)
LYMPHOCYTES # BLD AUTO: 1743 CELLS/UL (ref 850–3900)
LYMPHOCYTES NFR BLD AUTO: 24.9 %
MCH RBC QN AUTO: 32.4 PG (ref 27–33)
MCHC RBC AUTO-ENTMCNC: 33.8 G/DL (ref 32–36)
MCV RBC AUTO: 96.1 FL (ref 80–100)
MONOCYTES # BLD AUTO: 693 CELLS/UL (ref 200–950)
MONOCYTES NFR BLD AUTO: 9.9 %
NEUTROPHILS # BLD AUTO: 4354 CELLS/UL (ref 1500–7800)
NEUTROPHILS NFR BLD AUTO: 62.2 %
NONHDLC SERPL-MCNC: 69 MG/DL (CALC)
PLATELET # BLD AUTO: 229 THOUSAND/UL (ref 140–400)
PMV BLD REES-ECKER: 9.7 FL (ref 7.5–12.5)
POTASSIUM SERPL-SCNC: 4.6 MMOL/L (ref 3.5–5.3)
PROT SERPL-MCNC: 6.5 G/DL (ref 6.1–8.1)
PSA SERPL-MCNC: 0.9 NG/ML
RBC # BLD AUTO: 4.13 MILLION/UL (ref 4.2–5.8)
SODIUM SERPL-SCNC: 138 MMOL/L (ref 135–146)
TRIGL SERPL-MCNC: 53 MG/DL
WBC # BLD AUTO: 7 THOUSAND/UL (ref 3.8–10.8)

## 2025-02-05 ENCOUNTER — OFFICE VISIT (OUTPATIENT)
Dept: CARDIOLOGY | Facility: CLINIC | Age: 80
End: 2025-02-05
Payer: MEDICARE

## 2025-02-05 VITALS
OXYGEN SATURATION: 98 % | SYSTOLIC BLOOD PRESSURE: 139 MMHG | DIASTOLIC BLOOD PRESSURE: 72 MMHG | HEART RATE: 55 BPM | BODY MASS INDEX: 24.97 KG/M2 | WEIGHT: 174 LBS

## 2025-02-05 DIAGNOSIS — I25.10 CORONARY ARTERIOSCLEROSIS: Primary | ICD-10-CM

## 2025-02-05 DIAGNOSIS — I10 BENIGN ESSENTIAL HYPERTENSION: ICD-10-CM

## 2025-02-05 DIAGNOSIS — E78.2 MIXED HYPERLIPIDEMIA: ICD-10-CM

## 2025-02-05 PROCEDURE — 1159F MED LIST DOCD IN RCRD: CPT | Performed by: NURSE PRACTITIONER

## 2025-02-05 PROCEDURE — 1160F RVW MEDS BY RX/DR IN RCRD: CPT | Performed by: NURSE PRACTITIONER

## 2025-02-05 PROCEDURE — 1157F ADVNC CARE PLAN IN RCRD: CPT | Performed by: NURSE PRACTITIONER

## 2025-02-05 PROCEDURE — 99214 OFFICE O/P EST MOD 30 MIN: CPT | Performed by: NURSE PRACTITIONER

## 2025-02-05 PROCEDURE — G2211 COMPLEX E/M VISIT ADD ON: HCPCS | Performed by: NURSE PRACTITIONER

## 2025-02-05 PROCEDURE — 3078F DIAST BP <80 MM HG: CPT | Performed by: NURSE PRACTITIONER

## 2025-02-05 PROCEDURE — 1036F TOBACCO NON-USER: CPT | Performed by: NURSE PRACTITIONER

## 2025-02-05 PROCEDURE — 3075F SYST BP GE 130 - 139MM HG: CPT | Performed by: NURSE PRACTITIONER

## 2025-02-05 RX ORDER — ASPIRIN 81 MG/1
81 TABLET ORAL DAILY
COMMUNITY

## 2025-02-05 ASSESSMENT — ENCOUNTER SYMPTOMS
GASTROINTESTINAL NEGATIVE: 1
MUSCULOSKELETAL NEGATIVE: 1
CARDIOVASCULAR NEGATIVE: 1
RESPIRATORY NEGATIVE: 1
NEUROLOGICAL NEGATIVE: 1
CONSTITUTIONAL NEGATIVE: 1

## 2025-02-05 NOTE — PROGRESS NOTES
Chief Complaint:   Follow-up (6 mth.)    History Of Present Illness:    .Mr Huff returns in follow up.  Denies chest pain, sob, palpitations or pedal edema.                 Last Recorded Vitals:  Blood pressure 139/72, pulse 55, weight 78.9 kg (174 lb), SpO2 98%.     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    History of acute myocardial infarction   • Personal history of malignant neoplasm, unspecified     History of malignant neoplasm   • Prostate cancer (Multi)    • Skin cancer         Past Surgical History:  Past Surgical History:   Procedure Laterality Date   • CARDIAC CATHETERIZATION     • KNEE SURGERY Left    • OTHER SURGICAL HISTORY  02/15/2022    Knee surgery   • OTHER SURGICAL HISTORY  02/15/2022    Hernia repair       Social History:  Social History     Socioeconomic History   • Marital status:    Tobacco Use   • Smoking status: Former     Current packs/day: 0.00     Types: Cigarettes     Start date:      Quit date:      Years since quittin.1     Passive exposure: Never   • Smokeless tobacco: Never   Vaping Use   • Vaping status: Never Used   Substance and Sexual Activity   • Alcohol use: Yes     Comment: OCCASIONAL   • Drug use: Never   • Sexual activity: Defer     Social Drivers of Health     Financial Resource Strain: Low Risk  (2024)    Overall Financial Resource Strain (CARDIA)    • Difficulty of Paying Living Expenses: Not hard at all   Transportation Needs: No Transportation Needs (3/13/2024)    OASIS : Transportation    • Lack of Transportation (Medical): No    • Lack of Transportation (Non-Medical): No    • Patient Unable or Declines to Respond: No   Recent Concern: Transportation Needs - Unmet Transportation Needs  (2/24/2024)    OASIS : Transportation    • Lack of Transportation (Medical): Yes    • Lack of Transportation (Non-Medical): Yes    • Patient Unable or Declines to Respond: No   Social Connections: Feeling Socially Integrated (3/13/2024)    OASIS : Social Isolation    • Frequency of experiencing loneliness or isolation: Never   Housing Stability: Low Risk  (2/16/2024)    Housing Stability Vital Sign    • Unable to Pay for Housing in the Last Year: No    • Number of Places Lived in the Last Year: 1    • Unstable Housing in the Last Year: No       Family History:  Family History   Problem Relation Name Age of Onset   • Other (CABG) Mother     • Heart attack Father     • Heart attack Brother     • Other (CABG) Brother     • Heart attack Brother     • Other (CABG) Brother           Allergies:  Eszopiclone, Mirabegron, and Oxybutynin    Outpatient Medications:  Current Outpatient Medications   Medication Sig Dispense Refill   • acetaminophen (Tylenol) 325 mg tablet Take 2 tablets (650 mg) by mouth every 4 hours if needed for mild pain (1 - 3). 30 tablet 0   • allopurinol (Zyloprim) 300 mg tablet Take 1 tablet (300 mg) by mouth once daily. 90 tablet 3   • atorvastatin (Lipitor) 20 mg tablet TAKE 1 TABLET BY MOUTH ONCE  DAILY 90 tablet 3   • lisinopril 10 mg tablet Take 1 tablet (10 mg) by mouth once daily. 100 tablet 3   • metoprolol succinate XL (Toprol-XL) 25 mg 24 hr tablet TAKE ONE TABLET BY MOUTH EVERY DAY 90 tablet 3   • metroNIDAZOLE (Metrogel) 0.75 % gel Apply topically 2 times a day. 35 g 11   • nitroglycerin (Nitrostat) 0.4 mg SL tablet Place 1 tablet (0.4 mg) under the tongue if needed for chest pain.     • ondansetron ODT (Zofran-ODT) 4 mg disintegrating tablet Take 1 tablet (4 mg) by mouth every 8 hours if needed for nausea. 20 tablet 0   • solifenacin (VESIcare) 10 mg tablet Take 1 tablet by mouth once daily 90 tablet 3   • zolpidem (Ambien) 10 mg tablet TAKE 1/2 TO 1 TABLET BY MOUTH  ONCE DAILY AT  BEDTIME 90 tablet 1     No current facility-administered medications for this visit.        Physical Exam:  Cardiovascular:      PMI at left midclavicular line. Normal rate. Regular rhythm. Normal S1. Normal S2.       Murmurs: There is no murmur.      No gallop.  No click. No rub.   Pulses:     Intact distal pulses.   Edema:     Peripheral edema absent.       ROS:  Review of Systems   Constitutional: Negative.   Cardiovascular: Negative.    Respiratory: Negative.     Skin: Negative.    Musculoskeletal: Negative.    Gastrointestinal: Negative.    Genitourinary: Negative.    Neurological: Negative.           Last Labs:  CBC -  Lab Results   Component Value Date    WBC 7.0 01/28/2025    HGB 13.4 01/28/2025    HCT 39.7 01/28/2025    MCV 96.1 01/28/2025     01/28/2025       CMP -  Lab Results   Component Value Date    CALCIUM 9.1 01/28/2025    PHOS 3.0 02/18/2024    PROT 6.5 01/28/2025    ALBUMIN 4.3 01/28/2025    AST 18 01/28/2025    ALT 16 01/28/2025    ALKPHOS 83 01/28/2025    BILITOT 0.8 01/28/2025       LIPID PANEL -   Lab Results   Component Value Date    CHOL 121 01/28/2025    TRIG 53 01/28/2025    HDL 52 01/28/2025    CHHDL 2.3 01/28/2025    LDLF 41 01/21/2022    VLDL 25 02/06/2024    NHDL 69 01/28/2025       RENAL FUNCTION PANEL -   Lab Results   Component Value Date    GLUCOSE 93 01/28/2025     01/28/2025    K 4.6 01/28/2025     01/28/2025    CO2 21 01/28/2025    ANIONGAP 9 01/28/2025    BUN 25 01/28/2025    CREATININE 1.16 01/28/2025    CALCIUM 9.1 01/28/2025    PHOS 3.0 02/18/2024    ALBUMIN 4.3 01/28/2025        Lab Results   Component Value Date    BNP 29 02/16/2024         Assessment/Plan   Problem List Items Addressed This Visit    None    1. CAD. Patient has a history of documented coronary artery disease with a non-STEMI on 6/15/2016 secondary to complete occlusion of the diagonal branch, not an amenable to percutaneous coronary intervention. Patient had a very similar episode of  chest discomfort lasting nearly 3 hours for which she was seen at the [place] emergency department on October 11, 2021. EKG and high sensitive troponins were negative for infarction. Given the very similar nature of his symptoms to the original non-STEMI, diagnostic coronary angiography was recommended. The patient underwent diagnostic coronary angiography with LMCA normal, LAD has minor proximal luminal irregularities, complete 100% occlusion of the proximal first diagonal branch as previously noted with retrograde filling. The nondominant LCx has moderate disease. The dominant right coronary artery also has minor disease except for a 50% stenosis within the early mid posterior descending branch unchanged from his previous study as well. The left ventricle EF shows preserved left ventricular function. No significant change in anatomy since his study that was performed 6/15/2016. Medical management was recommended. Echo done 04/2022 showed EF 60-65% with mild focal basal septal hypertrophy. We will have him return in 6 months.     2. Hypertension.     3. Hyperlipidemia.     4. Hx of TKR.         Mariela Goodson, APRN-CNP

## 2025-02-16 ENCOUNTER — PATIENT MESSAGE (OUTPATIENT)
Dept: PRIMARY CARE | Facility: CLINIC | Age: 80
End: 2025-02-16
Payer: MEDICARE

## 2025-02-16 DIAGNOSIS — E78.2 MIXED HYPERLIPIDEMIA: ICD-10-CM

## 2025-02-16 DIAGNOSIS — F51.01 PRIMARY INSOMNIA: ICD-10-CM

## 2025-02-17 RX ORDER — ATORVASTATIN CALCIUM 20 MG/1
20 TABLET, FILM COATED ORAL DAILY
Qty: 90 TABLET | Refills: 3 | Status: SHIPPED | OUTPATIENT
Start: 2025-02-17

## 2025-02-17 RX ORDER — ZOLPIDEM TARTRATE 10 MG/1
10 TABLET ORAL NIGHTLY PRN
Qty: 90 TABLET | Refills: 1 | Status: SHIPPED | OUTPATIENT
Start: 2025-02-17

## 2025-03-25 ENCOUNTER — APPOINTMENT (OUTPATIENT)
Dept: PRIMARY CARE | Facility: CLINIC | Age: 80
End: 2025-03-25
Payer: MEDICARE

## 2025-04-18 ENCOUNTER — APPOINTMENT (OUTPATIENT)
Dept: PRIMARY CARE | Facility: CLINIC | Age: 80
End: 2025-04-18
Payer: MEDICARE

## 2025-04-22 ENCOUNTER — APPOINTMENT (OUTPATIENT)
Dept: OTOLARYNGOLOGY | Facility: CLINIC | Age: 80
End: 2025-04-22
Payer: MEDICARE

## 2025-04-22 VITALS — BODY MASS INDEX: 25.05 KG/M2 | TEMPERATURE: 96.8 F | WEIGHT: 175 LBS | HEIGHT: 70 IN

## 2025-04-22 DIAGNOSIS — H60.549 DERMATITIS OF EAR CANAL, UNSPECIFIED LATERALITY: Primary | ICD-10-CM

## 2025-04-22 DIAGNOSIS — H61.20 CERUMEN IN AUDITORY CANAL ON EXAMINATION: ICD-10-CM

## 2025-04-22 PROCEDURE — 1159F MED LIST DOCD IN RCRD: CPT | Performed by: OTOLARYNGOLOGY

## 2025-04-22 PROCEDURE — 1157F ADVNC CARE PLAN IN RCRD: CPT | Performed by: OTOLARYNGOLOGY

## 2025-04-22 PROCEDURE — 1036F TOBACCO NON-USER: CPT | Performed by: OTOLARYNGOLOGY

## 2025-04-22 PROCEDURE — 99213 OFFICE O/P EST LOW 20 MIN: CPT | Performed by: OTOLARYNGOLOGY

## 2025-04-22 NOTE — PROGRESS NOTES
"MARIANELA Huff is a 80 y.o. male last seen by me in January 2023.  He is here today with complaints of some additional ear debris and itching in the ear canals bilaterally.  Hearing is stable.  Denies otalgia.  He has used fluocinolone oil in the past.      Medical History[1]         Medications:   Current Medications[2]     Allergies:  Allergies[3]     Physical Exam:  Last Recorded Vitals  Temperature 36 °C (96.8 °F), height 1.778 m (5' 10\"), weight 79.4 kg (175 lb).  General:     General appearance: Well-developed, well-nourished in no acute distress.       Voice:  normal       Head/face: Normal appearance; nontender to palpation     Facial nerve function: Normal and symmetric bilaterally.    Oral/oropharynx:     Oral vestibule: Normal labial and gingival mucosa     Tongue/floor of mouth: Normal without lesion     Oropharynx: Clear.  No lesions present of the hard/soft palate, posterior pharynx    Neck:     Neck: Normal appearance, trachea midline     Salivary glands: Normal to palpation bilaterally     Lymph nodes: No cervical lymphadenopathy to palpation     Thyroid: No thyromegaly.  No palpable nodules     Range of motion: Normal    Neurological:     Cortical functions: Alert and oriented x3, appropriate affect       Larynx/hypopharynx:     Laryngeal findings: Mirror exam inadequate or limited secondary to enlarged base of tongue and/or excessive gagging    Ear:     Ear canal: Scaling and dermatitis changes of the external auditory meatus.  Cerumen cleaned from the right with suction and alligator.  No evidence of infection     Tympanic membrane: Intact and mobile bilaterally     Pinna: Normal bilaterally     Hearing:  Gross hearing assessment normal by voice    Nose:     Visualized using: Anterior rhinoscopy     Nasopharynx: Inadequate mirror exam secondary to gag, anatomy.       Nasal dorsum: Nontraumatic midline appearance     Septum: deviation     Inferior turbinates: Normally sized     Mucosa: " Bilateral, pink, normal appearing       ASSESSMENT/PLAN:  Ear canal dermatitis with itching.  Recommend mineral oil and antidandruff shampoos.  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/12/2021    History of acute myocardial infarction    Personal history of malignant neoplasm, unspecified     History of malignant neoplasm    Prostate cancer (Multi)     Skin cancer    [2]   Current Outpatient Medications:     acetaminophen (Tylenol) 325 mg tablet, Take 2 tablets (650 mg) by mouth every 4 hours if needed for mild pain (1 - 3)., Disp: 30 tablet, Rfl: 0    allopurinol (Zyloprim) 300 mg tablet, Take 1 tablet (300 mg) by mouth once daily., Disp: 90 tablet, Rfl: 3    aspirin 81 mg EC tablet, Take 1 tablet (81 mg) by mouth once daily., Disp: , Rfl:     atorvastatin (Lipitor) 20 mg tablet, Take 1 tablet (20 mg) by mouth once daily., Disp: 90 tablet, Rfl: 3    lisinopril 10 mg tablet, Take 1 tablet (10 mg) by mouth once daily., Disp: 100 tablet, Rfl: 3    metoprolol succinate XL (Toprol-XL) 25 mg 24 hr tablet, TAKE ONE TABLET BY MOUTH EVERY DAY, Disp: 90 tablet, Rfl: 3    metroNIDAZOLE (Metrogel) 0.75 % gel, Apply topically 2 times a day., Disp: 35 g, Rfl: 11    nitroglycerin (Nitrostat) 0.4 mg SL tablet, Place 1 tablet (0.4 mg) under the tongue if needed for chest pain., Disp: , Rfl:     ondansetron ODT (Zofran-ODT) 4 mg disintegrating tablet, Take 1 tablet (4 mg) by mouth every 8 hours if needed for nausea., Disp: 20 tablet, Rfl: 0    solifenacin (VESIcare) 10 mg tablet, Take 1 tablet by mouth once daily, Disp: 90 tablet, Rfl: 3    zolpidem (Ambien) 10 mg tablet, Take 1  tablet (10 mg) by mouth as needed at bedtime for sleep., Disp: 90 tablet, Rfl: 1  [3]   Allergies  Allergen Reactions    Eszopiclone Other     Bad taste    Mirabegron Other     Raised BP    Oxybutynin GI Upset

## 2025-05-08 ENCOUNTER — OFFICE VISIT (OUTPATIENT)
Dept: PRIMARY CARE | Facility: CLINIC | Age: 80
End: 2025-05-08
Payer: MEDICARE

## 2025-05-08 VITALS
WEIGHT: 176.2 LBS | HEART RATE: 64 BPM | DIASTOLIC BLOOD PRESSURE: 62 MMHG | OXYGEN SATURATION: 96 % | BODY MASS INDEX: 25.28 KG/M2 | SYSTOLIC BLOOD PRESSURE: 126 MMHG

## 2025-05-08 DIAGNOSIS — I50.42 CHRONIC COMBINED SYSTOLIC AND DIASTOLIC HEART FAILURE: ICD-10-CM

## 2025-05-08 DIAGNOSIS — K21.9 GASTROESOPHAGEAL REFLUX DISEASE WITHOUT ESOPHAGITIS: ICD-10-CM

## 2025-05-08 DIAGNOSIS — F51.01 PRIMARY INSOMNIA: ICD-10-CM

## 2025-05-08 DIAGNOSIS — C61 PROSTATE CANCER (MULTI): ICD-10-CM

## 2025-05-08 DIAGNOSIS — I25.10 CORONARY ARTERIOSCLEROSIS: ICD-10-CM

## 2025-05-08 DIAGNOSIS — N40.1 BENIGN NON-NODULAR PROSTATIC HYPERPLASIA WITH LOWER URINARY TRACT SYMPTOMS: ICD-10-CM

## 2025-05-08 DIAGNOSIS — Z96.652 S/P TOTAL KNEE ARTHROPLASTY, LEFT: ICD-10-CM

## 2025-05-08 DIAGNOSIS — I10 BENIGN ESSENTIAL HYPERTENSION: Primary | ICD-10-CM

## 2025-05-08 DIAGNOSIS — E78.2 MIXED HYPERLIPIDEMIA: ICD-10-CM

## 2025-05-08 PROCEDURE — 3078F DIAST BP <80 MM HG: CPT | Performed by: INTERNAL MEDICINE

## 2025-05-08 PROCEDURE — 1159F MED LIST DOCD IN RCRD: CPT | Performed by: INTERNAL MEDICINE

## 2025-05-08 PROCEDURE — G2211 COMPLEX E/M VISIT ADD ON: HCPCS | Performed by: INTERNAL MEDICINE

## 2025-05-08 PROCEDURE — 1036F TOBACCO NON-USER: CPT | Performed by: INTERNAL MEDICINE

## 2025-05-08 PROCEDURE — 3074F SYST BP LT 130 MM HG: CPT | Performed by: INTERNAL MEDICINE

## 2025-05-08 PROCEDURE — 99214 OFFICE O/P EST MOD 30 MIN: CPT | Performed by: INTERNAL MEDICINE

## 2025-05-08 PROCEDURE — 1126F AMNT PAIN NOTED NONE PRSNT: CPT | Performed by: INTERNAL MEDICINE

## 2025-05-08 ASSESSMENT — ENCOUNTER SYMPTOMS
PSYCHIATRIC NEGATIVE: 1
VOMITING: 0
SHORTNESS OF BREATH: 0
DYSURIA: 0
ABDOMINAL PAIN: 0
OCCASIONAL FEELINGS OF UNSTEADINESS: 0
COUGH: 0
ARTHRALGIAS: 1
ACTIVITY CHANGE: 0
DIARRHEA: 0
NAUSEA: 0
ABDOMINAL DISTENTION: 0
CONSTIPATION: 0
DEPRESSION: 0
CONSTITUTIONAL NEGATIVE: 1
PALPITATIONS: 0
LOSS OF SENSATION IN FEET: 0

## 2025-05-08 ASSESSMENT — PAIN SCALES - GENERAL: PAINLEVEL_OUTOF10: 0-NO PAIN

## 2025-05-08 NOTE — PROGRESS NOTES
Subjective   Patient ID: Avery Huff is a 80 y.o. male who presents for 3 month follow up .    CAD with CHF-stable on meds-no NTG use-managed by Dr Edgar.     Hypertension-compliant and stable on current medications BP Readings from Last 3 Encounters:  01/17/25 : 152/78  10/09/24 : 138/74  09/21/24 : 171/82     ARF post knee replacement  last eGFR 65  4/2024---has resolved     Hyperlipidemia-stable and compliant on meds. Taking atorvastatin. Lab Results       Component                Value               Date                       LDLCALC                  56                  01/28/2025               H/O prostate cancer-now with urinary issue and ED-on vesicare    Kidney stone-uric acid  . Taking allopurinol.  Lab Results       Component                Value               Date                       URICACID                 5.3                 02/06/2024               Chronic insomnia-relies on zolpidem to sleep. [place]AR[place] reviewed 5/9/2025. E consent signed 1/17/2025.     Exercise -just started golfing      Diet  - eating nearly back to normal             Review of Systems   Constitutional: Negative.  Negative for activity change.        Weight stable on home scale   HENT:          Using allegra and helping spring allergies   Respiratory:  Negative for cough and shortness of breath.    Cardiovascular:  Negative for chest pain, palpitations and leg swelling (resolved).   Gastrointestinal:  Negative for abdominal distention, abdominal pain, constipation, diarrhea, nausea and vomiting.   Genitourinary:  Negative for dysuria.   Musculoskeletal:  Positive for arthralgias (catrina L knee).        Knee doing pretty good   Skin:  Negative for rash.   Psychiatric/Behavioral: Negative.         Objective   /62 (BP Location: Left arm, Patient Position: Sitting)   Pulse 64   Wt 79.9 kg (176 lb 3.2 oz)   SpO2 96%   BMI 25.28 kg/m²     Physical Exam  Vitals reviewed.   Constitutional:       General: He is not in acute  distress.     Appearance: Normal appearance.   Cardiovascular:      Rate and Rhythm: Normal rate and regular rhythm.      Heart sounds: Normal heart sounds. No murmur heard.     No gallop.   Pulmonary:      Breath sounds: Normal breath sounds. No wheezing, rhonchi or rales.   Skin:     General: Skin is warm and dry.      Findings: No rash.      Comments: Early tan   Neurological:      General: No focal deficit present.      Mental Status: He is alert and oriented to person, place, and time.   Psychiatric:         Mood and Affect: Mood normal.      Comments: Much brighter and animated today-able to talk about enjoying [employer]         Assessment/Plan  will continue regimen  Diagnoses and all orders for this visit:  Benign essential hypertension  Mixed hyperlipidemia  Chronic combined systolic and diastolic heart failure  Coronary arteriosclerosis  Gastroesophageal reflux disease without esophagitis  Benign non-nodular prostatic hyperplasia with lower urinary tract symptoms  Prostate cancer (Multi)  S/P total knee arthroplasty, left  Primary insomnia    Current Medications[1]           [1]   Current Outpatient Medications:     acetaminophen (Tylenol) 325 mg tablet, Take 2 tablets (650 mg) by mouth every 4 hours if needed for mild pain (1 - 3)., Disp: 30 tablet, Rfl: 0    allopurinol (Zyloprim) 300 mg tablet, Take 1 tablet (300 mg) by mouth once daily., Disp: 90 tablet, Rfl: 3    aspirin 81 mg EC tablet, Take 1 tablet (81 mg) by mouth once daily., Disp: , Rfl:     atorvastatin (Lipitor) 20 mg tablet, Take 1 tablet (20 mg) by mouth once daily., Disp: 90 tablet, Rfl: 3    lisinopril 10 mg tablet, Take 1 tablet (10 mg) by mouth once daily., Disp: 100 tablet, Rfl: 3    metoprolol succinate XL (Toprol-XL) 25 mg 24 hr tablet, TAKE ONE TABLET BY MOUTH EVERY DAY, Disp: 90 tablet, Rfl: 3    metroNIDAZOLE (Metrogel) 0.75 % gel, Apply topically 2 times a day., Disp: 35 g, Rfl: 11    nitroglycerin (Nitrostat) 0.4 mg SL tablet,  Place 1 tablet (0.4 mg) under the tongue if needed for chest pain., Disp: , Rfl:     ondansetron ODT (Zofran-ODT) 4 mg disintegrating tablet, Take 1 tablet (4 mg) by mouth every 8 hours if needed for nausea., Disp: 20 tablet, Rfl: 0    solifenacin (VESIcare) 10 mg tablet, Take 1 tablet by mouth once daily, Disp: 90 tablet, Rfl: 3    zolpidem (Ambien) 10 mg tablet, Take 1 tablet (10 mg) by mouth as needed at bedtime for sleep., Disp: 90 tablet, Rfl: 1

## 2025-05-20 ENCOUNTER — TELEPHONE (OUTPATIENT)
Dept: OTOLARYNGOLOGY | Facility: CLINIC | Age: 80
End: 2025-05-20
Payer: MEDICARE

## 2025-05-20 DIAGNOSIS — L30.9 DERMATITIS: Primary | ICD-10-CM

## 2025-05-20 NOTE — TELEPHONE ENCOUNTER
Pt was last seen 04/22/2025.  He said he has been doing everything you told him to do. For about 1 week the right ear has been hurting. No drainage just pain.   Allergic to eszopiclone, mirabegron and oxybutyin

## 2025-05-21 RX ORDER — FLUOCINOLONE ACETONIDE 0.11 MG/ML
OIL AURICULAR (OTIC)
Qty: 20 ML | Refills: 1 | Status: SHIPPED | OUTPATIENT
Start: 2025-05-21

## 2025-06-12 DIAGNOSIS — I10 BENIGN ESSENTIAL HYPERTENSION: ICD-10-CM

## 2025-06-12 RX ORDER — METOPROLOL SUCCINATE 25 MG/1
25 TABLET, EXTENDED RELEASE ORAL DAILY
Qty: 90 TABLET | Refills: 0 | Status: SHIPPED | OUTPATIENT
Start: 2025-06-12

## 2025-08-06 ENCOUNTER — APPOINTMENT (OUTPATIENT)
Dept: CARDIOLOGY | Facility: CLINIC | Age: 80
End: 2025-08-06
Payer: MEDICARE

## 2025-08-12 ENCOUNTER — OFFICE VISIT (OUTPATIENT)
Dept: PRIMARY CARE | Facility: CLINIC | Age: 80
End: 2025-08-12
Payer: MEDICARE

## 2025-08-12 VITALS
WEIGHT: 172.8 LBS | SYSTOLIC BLOOD PRESSURE: 124 MMHG | DIASTOLIC BLOOD PRESSURE: 72 MMHG | HEART RATE: 54 BPM | BODY MASS INDEX: 24.79 KG/M2 | OXYGEN SATURATION: 98 %

## 2025-08-12 DIAGNOSIS — K21.9 GASTROESOPHAGEAL REFLUX DISEASE WITHOUT ESOPHAGITIS: ICD-10-CM

## 2025-08-12 DIAGNOSIS — C61 PROSTATE CANCER (MULTI): ICD-10-CM

## 2025-08-12 DIAGNOSIS — I50.42 CHRONIC COMBINED SYSTOLIC AND DIASTOLIC HEART FAILURE: ICD-10-CM

## 2025-08-12 DIAGNOSIS — F51.01 PRIMARY INSOMNIA: ICD-10-CM

## 2025-08-12 DIAGNOSIS — E78.2 MIXED HYPERLIPIDEMIA: ICD-10-CM

## 2025-08-12 DIAGNOSIS — I10 BENIGN ESSENTIAL HYPERTENSION: Primary | ICD-10-CM

## 2025-08-12 DIAGNOSIS — I25.10 CORONARY ARTERIOSCLEROSIS: ICD-10-CM

## 2025-08-12 DIAGNOSIS — Z96.652 S/P TOTAL KNEE ARTHROPLASTY, LEFT: ICD-10-CM

## 2025-08-12 PROCEDURE — 99214 OFFICE O/P EST MOD 30 MIN: CPT | Performed by: INTERNAL MEDICINE

## 2025-08-12 RX ORDER — ZOLPIDEM TARTRATE 10 MG/1
10 TABLET ORAL NIGHTLY PRN
Qty: 90 TABLET | Refills: 1 | Status: SHIPPED | OUTPATIENT
Start: 2025-08-12

## 2025-08-12 ASSESSMENT — ENCOUNTER SYMPTOMS
ABDOMINAL PAIN: 0
DYSURIA: 0
PALPITATIONS: 0
ARTHRALGIAS: 1
ABDOMINAL DISTENTION: 0
LOSS OF SENSATION IN FEET: 0
NAUSEA: 0
CONSTITUTIONAL NEGATIVE: 1
DEPRESSION: 0
DIARRHEA: 0
CONSTIPATION: 0
SHORTNESS OF BREATH: 0
COUGH: 0
VOMITING: 0
DYSPHORIC MOOD: 1
OCCASIONAL FEELINGS OF UNSTEADINESS: 0
ACTIVITY CHANGE: 0

## 2025-08-12 ASSESSMENT — PATIENT HEALTH QUESTIONNAIRE - PHQ9
5. POOR APPETITE OR OVEREATING: NOT AT ALL
6. FEELING BAD ABOUT YOURSELF - OR THAT YOU ARE A FAILURE OR HAVE LET YOURSELF OR YOUR FAMILY DOWN: SEVERAL DAYS
2. FEELING DOWN, DEPRESSED OR HOPELESS: SEVERAL DAYS
4. FEELING TIRED OR HAVING LITTLE ENERGY: NOT AT ALL
1. LITTLE INTEREST OR PLEASURE IN DOING THINGS: SEVERAL DAYS
8. MOVING OR SPEAKING SO SLOWLY THAT OTHER PEOPLE COULD HAVE NOTICED. OR THE OPPOSITE, BEING SO FIGETY OR RESTLESS THAT YOU HAVE BEEN MOVING AROUND A LOT MORE THAN USUAL: NOT AT ALL
SUM OF ALL RESPONSES TO PHQ QUESTIONS 1-9: 7
7. TROUBLE CONCENTRATING ON THINGS, SUCH AS READING THE NEWSPAPER OR WATCHING TELEVISION: SEVERAL DAYS
SUM OF ALL RESPONSES TO PHQ9 QUESTIONS 1 AND 2: 2
3. TROUBLE FALLING OR STAYING ASLEEP OR SLEEPING TOO MUCH: MORE THAN HALF THE DAYS
9. THOUGHTS THAT YOU WOULD BE BETTER OFF DEAD, OR OF HURTING YOURSELF: SEVERAL DAYS

## 2025-08-12 ASSESSMENT — PAIN SCALES - GENERAL: PAINLEVEL_OUTOF10: 0-NO PAIN

## 2025-08-14 ENCOUNTER — APPOINTMENT (OUTPATIENT)
Dept: CARDIOLOGY | Facility: CLINIC | Age: 80
End: 2025-08-14
Payer: MEDICARE

## 2025-11-12 ENCOUNTER — APPOINTMENT (OUTPATIENT)
Dept: PRIMARY CARE | Facility: CLINIC | Age: 80
End: 2025-11-12
Payer: MEDICARE

## (undated) DEVICE — GLOVE, SURGICAL, PROTEXIS PI ORTHO, 7.5, PF, LF

## (undated) DEVICE — HOOD, STERISHIELD T4 SYSTEM

## (undated) DEVICE — Device

## (undated) DEVICE — WOUND SYSTEM, DEBRIDEMENT & CLEANING, O.R DUOPAK

## (undated) DEVICE — SUTURE, MONOCRYL, 2-0, 36 IN, CT-1, UNDYED

## (undated) DEVICE — SUTURE, STRATAFIX, SYMMETRIC PDS PLUS, SIZE 1, 12IN, VIOLET. CT1 36MM

## (undated) DEVICE — IRRIGATION SYSTEM, WOUND, PULSAVAC PLUS

## (undated) DEVICE — HOOD, SURGICAL, FLYTE, T7 PLUS, PEEL AWAY SHIELD

## (undated) DEVICE — DRESSING, MEPILEX BORDER, POST-OP AG, 4 X 10 IN

## (undated) DEVICE — DRAPE, INSTRUMENT, W/POUCH, STERI DRAPE, 7 X 11 IN, DISPOSABLE, STERILE

## (undated) DEVICE — GLOVE, SURGICAL, PROTEXIS PI BLUE W/NEUTHERA, 8.0, PF, LF

## (undated) DEVICE — BANDAGE, COFLEX, 6 X 5 YDS, TAN, STERILE, LF

## (undated) DEVICE — BANDAGE, ELASTIC, ACE, ACE, DOUBLE LENGTH, 6 X 550 IN, LF

## (undated) DEVICE — SOLUTION, INJECTION, SODIUM CHLORIDE 9%, 3000ML

## (undated) DEVICE — SKIN CLOSURE SYS, PREMIERPRO EXOFIN, 1-4CM X 22CM, 1.75G TUBE

## (undated) DEVICE — BLADE, RECIPROCATING, LARGE, 12.7MM

## (undated) DEVICE — MARKER, SURGICAL, SKIN, STANDARD, W/RULER, LF

## (undated) DEVICE — SUTURE, VICRYL, 1, 27 IN, CT-1, VIOLET

## (undated) DEVICE — TIP, SUCTION, FRAZIER, W/CONTROL VENT, 12 FR

## (undated) DEVICE — BLADE, SAW, SAGITTAL, 18.0 X 1.27 X 90MM

## (undated) DEVICE — MIXER, CEMENT, MIXEVAC III HIGH VACUUM KIT, STERILE